# Patient Record
Sex: MALE | Race: WHITE | NOT HISPANIC OR LATINO | Employment: FULL TIME | URBAN - METROPOLITAN AREA
[De-identification: names, ages, dates, MRNs, and addresses within clinical notes are randomized per-mention and may not be internally consistent; named-entity substitution may affect disease eponyms.]

---

## 2018-11-13 ENCOUNTER — LAB VISIT (OUTPATIENT)
Dept: LAB | Facility: HOSPITAL | Age: 58
End: 2018-11-13
Attending: INTERNAL MEDICINE
Payer: COMMERCIAL

## 2018-11-13 ENCOUNTER — OFFICE VISIT (OUTPATIENT)
Dept: INTERNAL MEDICINE | Facility: CLINIC | Age: 58
End: 2018-11-13
Payer: COMMERCIAL

## 2018-11-13 VITALS
SYSTOLIC BLOOD PRESSURE: 160 MMHG | OXYGEN SATURATION: 95 % | WEIGHT: 206.13 LBS | BODY MASS INDEX: 32.35 KG/M2 | HEART RATE: 71 BPM | DIASTOLIC BLOOD PRESSURE: 106 MMHG | HEIGHT: 67 IN

## 2018-11-13 DIAGNOSIS — I10 UNCONTROLLED STAGE 2 HYPERTENSION: Primary | ICD-10-CM

## 2018-11-13 DIAGNOSIS — E66.9 OBESITY (BMI 30-39.9): ICD-10-CM

## 2018-11-13 DIAGNOSIS — Z23 NEED FOR INFLUENZA VACCINATION: ICD-10-CM

## 2018-11-13 DIAGNOSIS — Z11.59 NEED FOR HEPATITIS C SCREENING TEST: ICD-10-CM

## 2018-11-13 DIAGNOSIS — E78.5 HYPERLIPIDEMIA, UNSPECIFIED HYPERLIPIDEMIA TYPE: ICD-10-CM

## 2018-11-13 DIAGNOSIS — I10 UNCONTROLLED STAGE 2 HYPERTENSION: ICD-10-CM

## 2018-11-13 DIAGNOSIS — J01.41 ACUTE RECURRENT PANSINUSITIS: ICD-10-CM

## 2018-11-13 LAB
ALBUMIN SERPL BCP-MCNC: 3.7 G/DL
ALP SERPL-CCNC: 75 U/L
ALT SERPL W/O P-5'-P-CCNC: 28 U/L
ANION GAP SERPL CALC-SCNC: 12 MMOL/L
AST SERPL-CCNC: 25 U/L
BILIRUB SERPL-MCNC: 0.8 MG/DL
BUN SERPL-MCNC: 18 MG/DL
CALCIUM SERPL-MCNC: 9.4 MG/DL
CHLORIDE SERPL-SCNC: 101 MMOL/L
CHOLEST SERPL-MCNC: 238 MG/DL
CHOLEST/HDLC SERPL: 5.1 {RATIO}
CO2 SERPL-SCNC: 28 MMOL/L
CREAT SERPL-MCNC: 1.1 MG/DL
EST. GFR  (AFRICAN AMERICAN): >60 ML/MIN/1.73 M^2
EST. GFR  (NON AFRICAN AMERICAN): >60 ML/MIN/1.73 M^2
ESTIMATED AVG GLUCOSE: 111 MG/DL
GLUCOSE SERPL-MCNC: 113 MG/DL
HBA1C MFR BLD HPLC: 5.5 %
HDLC SERPL-MCNC: 47 MG/DL
HDLC SERPL: 19.7 %
LDLC SERPL CALC-MCNC: 165.6 MG/DL
NONHDLC SERPL-MCNC: 191 MG/DL
POTASSIUM SERPL-SCNC: 3.8 MMOL/L
PROT SERPL-MCNC: 7.2 G/DL
SODIUM SERPL-SCNC: 141 MMOL/L
TRIGL SERPL-MCNC: 127 MG/DL

## 2018-11-13 PROCEDURE — 36415 COLL VENOUS BLD VENIPUNCTURE: CPT | Mod: PO

## 2018-11-13 PROCEDURE — 83036 HEMOGLOBIN GLYCOSYLATED A1C: CPT

## 2018-11-13 PROCEDURE — 86803 HEPATITIS C AB TEST: CPT

## 2018-11-13 PROCEDURE — 80061 LIPID PANEL: CPT

## 2018-11-13 PROCEDURE — 99999 PR PBB SHADOW E&M-EST. PATIENT-LVL III: CPT | Mod: PBBFAC,,, | Performed by: INTERNAL MEDICINE

## 2018-11-13 PROCEDURE — 99203 OFFICE O/P NEW LOW 30 MIN: CPT | Mod: 25,S$GLB,, | Performed by: INTERNAL MEDICINE

## 2018-11-13 PROCEDURE — 3008F BODY MASS INDEX DOCD: CPT | Mod: CPTII,S$GLB,, | Performed by: INTERNAL MEDICINE

## 2018-11-13 PROCEDURE — 80053 COMPREHEN METABOLIC PANEL: CPT

## 2018-11-13 PROCEDURE — 90686 IIV4 VACC NO PRSV 0.5 ML IM: CPT | Mod: S$GLB,,, | Performed by: INTERNAL MEDICINE

## 2018-11-13 PROCEDURE — 90471 IMMUNIZATION ADMIN: CPT | Mod: S$GLB,,, | Performed by: INTERNAL MEDICINE

## 2018-11-13 RX ORDER — ASPIRIN 81 MG/1
81 TABLET ORAL DAILY
COMMUNITY
End: 2022-08-29

## 2018-11-13 RX ORDER — FLUTICASONE PROPIONATE 50 MCG
2 SPRAY, SUSPENSION (ML) NASAL DAILY
Qty: 16 G | Refills: 1 | Status: SHIPPED | OUTPATIENT
Start: 2018-11-13 | End: 2018-12-13

## 2018-11-13 RX ORDER — LOSARTAN POTASSIUM 50 MG/1
50 TABLET ORAL DAILY
Qty: 30 TABLET | Refills: 0 | Status: SHIPPED | OUTPATIENT
Start: 2018-11-13 | End: 2018-12-31

## 2018-11-13 RX ORDER — AMOXICILLIN AND CLAVULANATE POTASSIUM 875; 125 MG/1; MG/1
1 TABLET, FILM COATED ORAL EVERY 12 HOURS
Qty: 14 TABLET | Refills: 0 | Status: SHIPPED | OUTPATIENT
Start: 2018-11-13 | End: 2018-11-20

## 2018-11-13 NOTE — PROGRESS NOTES
Subjective:       Patient ID: Fan Chris is a 58 y.o. male.    Chief Complaint: Establish Care    HPI Mr. 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  Patient has previously been followed by cardiologist Dr. Mcdaniel.  Reports that his cardiac function improved significantly.  It improved to a normal level.  He was discharged from Cardiology.  (However per cardiology's notes he was to follow up with them 6 months after last appointment which would have been October 2017).  Reports he is unsure about medications because of potential side effects.  He does not want medications that could hurt his kidneys or his liver.  States he was on blood pressure medicines for minute.  However he did not like them.  When asked about specific side effects with prior medications such as losartan or carvedilol, patient reports that he did not have any.  States he has had a colonoscopy approximately 2 years ago and this was normal.  He retired from [employer] in May.  He has gained 20 lb since that time.  He also complains about current sinus issues.  He has nasal congestion, runny nose, postnasal drip.  He has been seen by an ENT in the past but he is currently not following with 1.  He recently went to an urgent care for these symptoms.  He was prescribed a Z-Josh and he was given a steroid shot.  Symptoms are persistent.    Review of Systems:  HEENT: +sinus pressure/pain, +PND, +rhinorrhea  Cardio: No chest pain, No leg swelling  Neuro: No headaches    Objective:      Physical Exam   Constitutional: He is oriented to person, place, and time. He appears well-developed and well-nourished. No distress.   HENT:   Head: Normocephalic and atraumatic.   Eyes: Conjunctivae and EOM are normal.   Cardiovascular: Normal rate, regular rhythm, normal heart sounds and intact distal pulses. Exam reveals no gallop and  no friction rub.   No murmur heard.  Pulmonary/Chest: Effort normal and breath sounds normal. No stridor. No respiratory distress. He has no wheezes. He has no rales.   Neurological: He is alert and oriented to person, place, and time.   Skin: Skin is warm and dry. He is not diaphoretic.   Psychiatric: He has a normal mood and affect. His behavior is normal. Judgment and thought content normal.   Vitals reviewed.      Assessment:       1. Uncontrolled stage 2 hypertension    2. Obesity (BMI 30-39.9)    3. Need for influenza vaccination    4. Need for hepatitis C screening test    5. Acute recurrent pansinusitis        Plan:     1.  Uncontrolled stage II hypertension  Starting losartan 50 mg p.o. daily  CMP, lipids, A1c pending  Return to clinic in 1 week for follow-up    2.  Obesity  The patient's BMI has been recorded in the chart. The patient has been provided educational materials regarding the benefits of attaining and maintaining a normal weight. We will continue to address and follow this issue during follow up visits.    3.  Influenza vaccine given    4.  Hepatitis-C antibody screening test ordered    5.  Acute recurrent pansinusitis  Augmentin 875/125 mg p.o. b.i.d. x7 days  Flonase, 2 sprays in each nostril daily    Attempting to obtain colonoscopy records    RTC one week, follow up BP

## 2018-11-13 NOTE — PATIENT INSTRUCTIONS
Low-Fat Diet    A low-fat diet will help you lose weight. It also can lower cholesterol and prevent symptoms of gallbladder disease. The average American diet contains up to 50% fat. This means that 50% of all calories come from fat (about 80 grams to 100 grams of fat per day). Choosing normal portions of foods from the list below can help lower your fat intake. Experts recommend that only 20% to 35% of your daily calories come from fat. The remaining 65% to 80% of calories will come from protein and carbohydrates. This is much healthier for you.  Breads  Ok: Whole-wheat or rye bread, alessandra or soda crackers, maty toast, plain rolls, bagels, English muffins  Avoid: Rolls and breads containing whole milk or egg; waffles, pancakes, biscuits, corn bread; cheese crackers, other flavored crackers, pastries, doughnuts  Cereals  Ok: Oatmeal, whole-wheat, bran, multigrain, rice  Avoid: Granola or other cereals that have oil, coconut, or more than 2 grams of fat per serving  Cheese and eggs  Ok: Cheeses labeled low-fat; 3 whole eggs per week; egg whites and egg substitutes as desired  Avoid: All other cheeses  Desserts  Ok: Gelatin, slushy, coni food cake, meringues, nonfat yogurt, and puddings or sherbet made with nonfat milk  Avoid: Any other store-bought desserts, or desserts that have fat, whole milk, cream, chocolate, and coconut  Drinks  Ok: Nonfat milk, coffee, tea, fizzy (carbonated) drinks  Avoid: Whole and reduced-fat milk, evaporated and condensed milk, hot chocolate mixes, milk shakes, malts, eggnog  Fats  Ok: You may have up to 3 teaspoons of fat daily. This can be butter, margarine, mayonnaise, or healthy oils (canola or olive)  Avoid: Cream, nondairy creams, cream cheese, gravies, and cream sauces  Fruits  Ok: All fruits made without fat  Avoid: Coconut, olives  Meats, poultry, fish  Ok: Limit meat to 6 ounces daily (broiled, roasted, baked, grilled, or boiled). Buy lean cuts, and trim off the fat. Try  beef, fish, lamb, pork, and canned fish packed in water; also chicken and turkey with the skin removed.  Avoid: Fried meats, fish, or poultry; fried eggs, and fish canned in oils; fatty meats such as avila, sausage, corned beef, hot dogs, and lunch meats; meats with gravies and sauces  Potatoes, beans, pasta  Ok: Dried beans, split peas, lentils, potatoes, rice, pasta made without added fat  Avoid: French fries, potato chips, potatoes prepared with butter, refried beans  Soups  Ok: Clear broth soups without fat and with allowed vegetables  Avoid: Cream-based soups  Vegetables  Ok: Fresh, frozen, canned or dried vegetables, all made without added fat  Avoid: Fried vegetables and those prepared with butter, cream, sauces  Miscellaneous  Ok: Salt, sugar, jelly, hard candy, marshmallows, honey, syrup, spices and herbs, mustard, ketchup, lemon, and vinegar. Try to limit sweets and added sugars.  Avoid: Chocolate, nuts, coconut, and cream candies; sunflower, sesame, and other seeds; fried foods; cream sauces and gravies; pizza  Date Last Reviewed: 8/1/2016 © 2000-2017 [employer]. [address] [place] [address]. All rights reserved. This information is not intended as a substitute for professional medical care. Always follow your healthcare professional's instructions.        Finding Your Ideal Weight  Most of the people in magazines and on TV are far slimmer than average, yet this is the ideal that many people aim for. Before you decide that you wont be happy until you get down to a certain number of pounds, consider:    · Your age. You probably wish you could get back to your college weight. But normal changes in metabolism and hormone levels that occur with aging can make this more difficult.  · Your gender. In general, men have more muscle and heavier bones than women, which means that healthy men usually weigh more than healthy women of the same height.  · Your current weight. If you are  very heavy, focus on losing a smaller amount (such as 10 percent of your body weight). Losing just 5 to 10 pounds can improve your health.  Your body fat percentage  A pound of muscle weighs the same as a pound of fat, but it takes up less space. Think of a trained athlete and a couch potato. Even though they may be the same height and weight, the athlete looks fitter, is healthier, and probably wears a smaller size of clothing. If you are muscular, a body fat test may be a more accurate measure of your ideal weight than the bathroom scale. Talk to your healthcare provider, who can help you set appropriate goals for yourself.  Body mass index (BMI)  Your body mass index is a number calculated from your weight and height. It is a fairly reliable indivactor of body fatness for most people. To calculate your BMI, see this BMI calculator from the CDC: http://www.cdc.gov/healthyweight/assessing/bmi/adult_bmi/english_bmi_calculator/bmi_calculator.html  Date Last Reviewed: 6/1/2015  © 8684-7019 [employer]. [address] [place] [address], [place] [address]. All rights reserved. This information is not intended as a substitute for professional medical care. Always follow your healthcare professional's instructions.

## 2018-11-14 LAB — HCV AB SERPL QL IA: NEGATIVE

## 2018-11-20 ENCOUNTER — OFFICE VISIT (OUTPATIENT)
Dept: INTERNAL MEDICINE | Facility: CLINIC | Age: 58
End: 2018-11-20
Payer: COMMERCIAL

## 2018-11-20 VITALS
HEART RATE: 62 BPM | HEIGHT: 67 IN | WEIGHT: 201.25 LBS | OXYGEN SATURATION: 97 % | DIASTOLIC BLOOD PRESSURE: 90 MMHG | BODY MASS INDEX: 31.59 KG/M2 | SYSTOLIC BLOOD PRESSURE: 120 MMHG

## 2018-11-20 DIAGNOSIS — E66.9 OBESITY (BMI 30-39.9): ICD-10-CM

## 2018-11-20 DIAGNOSIS — I10 ESSENTIAL HYPERTENSION: ICD-10-CM

## 2018-11-20 DIAGNOSIS — E78.5 HYPERLIPIDEMIA, UNSPECIFIED HYPERLIPIDEMIA TYPE: ICD-10-CM

## 2018-11-20 PROCEDURE — 3008F BODY MASS INDEX DOCD: CPT | Mod: CPTII,S$GLB,, | Performed by: INTERNAL MEDICINE

## 2018-11-20 PROCEDURE — 99999 PR PBB SHADOW E&M-EST. PATIENT-LVL III: CPT | Mod: PBBFAC,,, | Performed by: INTERNAL MEDICINE

## 2018-11-20 PROCEDURE — 99213 OFFICE O/P EST LOW 20 MIN: CPT | Mod: S$GLB,,, | Performed by: INTERNAL MEDICINE

## 2018-11-20 NOTE — PATIENT INSTRUCTIONS
Low-Fat Diet    A low-fat diet will help you lose weight. It also can lower cholesterol and prevent symptoms of gallbladder disease. The average American diet contains up to 50% fat. This means that 50% of all calories come from fat (about 80 grams to 100 grams of fat per day). Choosing normal portions of foods from the list below can help lower your fat intake. Experts recommend that only 20% to 35% of your daily calories come from fat. The remaining 65% to 80% of calories will come from protein and carbohydrates. This is much healthier for you.  Breads  Ok: Whole-wheat or rye bread, alessandra or soda crackers, maty toast, plain rolls, bagels, English muffins  Avoid: Rolls and breads containing whole milk or egg; waffles, pancakes, biscuits, corn bread; cheese crackers, other flavored crackers, pastries, doughnuts  Cereals  Ok: Oatmeal, whole-wheat, bran, multigrain, rice  Avoid: Granola or other cereals that have oil, coconut, or more than 2 grams of fat per serving  Cheese and eggs  Ok: Cheeses labeled low-fat; 3 whole eggs per week; egg whites and egg substitutes as desired  Avoid: All other cheeses  Desserts  Ok: Gelatin, slushy, coni food cake, meringues, nonfat yogurt, and puddings or sherbet made with nonfat milk  Avoid: Any other store-bought desserts, or desserts that have fat, whole milk, cream, chocolate, and coconut  Drinks  Ok: Nonfat milk, coffee, tea, fizzy (carbonated) drinks  Avoid: Whole and reduced-fat milk, evaporated and condensed milk, hot chocolate mixes, milk shakes, malts, eggnog  Fats  Ok: You may have up to 3 teaspoons of fat daily. This can be butter, margarine, mayonnaise, or healthy oils (canola or olive)  Avoid: Cream, nondairy creams, cream cheese, gravies, and cream sauces  Fruits  Ok: All fruits made without fat  Avoid: Coconut, olives  Meats, poultry, fish  Ok: Limit meat to 6 ounces daily (broiled, roasted, baked, grilled, or boiled). Buy lean cuts, and trim off the fat. Try  beef, fish, lamb, pork, and canned fish packed in water; also chicken and turkey with the skin removed.  Avoid: Fried meats, fish, or poultry; fried eggs, and fish canned in oils; fatty meats such as avila, sausage, corned beef, hot dogs, and lunch meats; meats with gravies and sauces  Potatoes, beans, pasta  Ok: Dried beans, split peas, lentils, potatoes, rice, pasta made without added fat  Avoid: French fries, potato chips, potatoes prepared with butter, refried beans  Soups  Ok: Clear broth soups without fat and with allowed vegetables  Avoid: Cream-based soups  Vegetables  Ok: Fresh, frozen, canned or dried vegetables, all made without added fat  Avoid: Fried vegetables and those prepared with butter, cream, sauces  Miscellaneous  Ok: Salt, sugar, jelly, hard candy, marshmallows, honey, syrup, spices and herbs, mustard, ketchup, lemon, and vinegar. Try to limit sweets and added sugars.  Avoid: Chocolate, nuts, coconut, and cream candies; sunflower, sesame, and other seeds; fried foods; cream sauces and gravies; pizza  Date Last Reviewed: 8/1/2016 © 2000-2017 [employer]. [address] [place] [address]. All rights reserved. This information is not intended as a substitute for professional medical care. Always follow your healthcare professional's instructions.        Finding Your Ideal Weight  Most of the people in magazines and on TV are far slimmer than average, yet this is the ideal that many people aim for. Before you decide that you wont be happy until you get down to a certain number of pounds, consider:    · Your age. You probably wish you could get back to your college weight. But normal changes in metabolism and hormone levels that occur with aging can make this more difficult.  · Your gender. In general, men have more muscle and heavier bones than women, which means that healthy men usually weigh more than healthy women of the same height.  · Your current weight. If you are  very heavy, focus on losing a smaller amount (such as 10 percent of your body weight). Losing just 5 to 10 pounds can improve your health.  Your body fat percentage  A pound of muscle weighs the same as a pound of fat, but it takes up less space. Think of a trained athlete and a couch potato. Even though they may be the same height and weight, the athlete looks fitter, is healthier, and probably wears a smaller size of clothing. If you are muscular, a body fat test may be a more accurate measure of your ideal weight than the bathroom scale. Talk to your healthcare provider, who can help you set appropriate goals for yourself.  Body mass index (BMI)  Your body mass index is a number calculated from your weight and height. It is a fairly reliable indivactor of body fatness for most people. To calculate your BMI, see this BMI calculator from the CDC: http://www.cdc.gov/healthyweight/assessing/bmi/adult_bmi/english_bmi_calculator/bmi_calculator.html  Date Last Reviewed: 6/1/2015  © 3179-3325 [employer]. [address] [place] [address], [place] [address]. All rights reserved. This information is not intended as a substitute for professional medical care. Always follow your healthcare professional's instructions.

## 2018-11-20 NOTE — PROGRESS NOTES
Subjective:       Patient ID: Fan Chris is a 58 y.o. male.    Chief Complaint: Follow-up (blood pressure)    HPI Mr. Chris is a 58-year-old male with hypertension who presents for follow-up of hypertension.  Patient started on losartan 50 mg p.o. daily last appointment.  Patient states that he is feeling well.  He was also given a course of Augmentin for treatment of sinusitis.  He is feeling 90% better.  He has no acute complaints or concerns today.    Review of Systems   All other systems reviewed and are negative.      Objective:      Physical Exam   Constitutional: He is oriented to person, place, and time. He appears well-developed and well-nourished. No distress.   HENT:   Head: Normocephalic and atraumatic.   Eyes: Conjunctivae and EOM are normal.   Cardiovascular: Normal rate, regular rhythm, normal heart sounds and intact distal pulses. Exam reveals no gallop and no friction rub.   No murmur heard.  Pulmonary/Chest: Effort normal and breath sounds normal. No stridor. No respiratory distress. He has no wheezes. He has no rales.   Neurological: He is alert and oriented to person, place, and time.   Skin: Skin is warm and dry. He is not diaphoretic.   Psychiatric: He has a normal mood and affect. His behavior is normal. Judgment and thought content normal.   Vitals reviewed.      Assessment:       1. Essential hypertension    2. Hyperlipidemia, unspecified hyperlipidemia type    3. Obesity (BMI 30-39.9)        Plan:     1.  Essential hypertension  Improved. But not at goal.  Recommend increasing losartan.  However patient declines at this time.  He prefers to work on diet and exercise and return for repeat blood pressure check in 3 months.  Continue losartan 50 mg p.o. daily    2.  Hyperlipidemia  Patient's 10 year risk of cardiovascular disease is 8.1%, with LDL greater than 160.  Recommend statin medication. However patient declines at this time.  He prefers to work on diet and exercise and return for  repeat lipid panel check in 3 months    3.  Obesity  The patient's BMI has been recorded in the chart. The patient has been provided educational materials regarding the benefits of attaining and maintaining a normal weight. We will continue to address and follow this issue during follow up visits.    Return to clinic in 3 months for follow-up of essential hypertension and hyperlipidemia.

## 2018-12-17 DIAGNOSIS — Z12.11 COLON CANCER SCREENING: ICD-10-CM

## 2018-12-31 ENCOUNTER — OFFICE VISIT (OUTPATIENT)
Dept: INTERNAL MEDICINE | Facility: CLINIC | Age: 58
End: 2018-12-31
Payer: COMMERCIAL

## 2018-12-31 VITALS
OXYGEN SATURATION: 96 % | DIASTOLIC BLOOD PRESSURE: 100 MMHG | HEART RATE: 84 BPM | SYSTOLIC BLOOD PRESSURE: 160 MMHG | BODY MASS INDEX: 32.8 KG/M2 | WEIGHT: 209 LBS | HEIGHT: 67 IN

## 2018-12-31 DIAGNOSIS — N50.89 TESTICULAR MASS: Primary | ICD-10-CM

## 2018-12-31 DIAGNOSIS — I10 UNCONTROLLED STAGE 2 HYPERTENSION: ICD-10-CM

## 2018-12-31 PROCEDURE — 99999 PR PBB SHADOW E&M-EST. PATIENT-LVL III: CPT | Mod: PBBFAC,,, | Performed by: INTERNAL MEDICINE

## 2018-12-31 PROCEDURE — 99214 OFFICE O/P EST MOD 30 MIN: CPT | Mod: S$GLB,,, | Performed by: INTERNAL MEDICINE

## 2018-12-31 PROCEDURE — 3008F BODY MASS INDEX DOCD: CPT | Mod: CPTII,S$GLB,, | Performed by: INTERNAL MEDICINE

## 2018-12-31 RX ORDER — LOSARTAN POTASSIUM 25 MG/1
25 TABLET ORAL DAILY
Qty: 30 TABLET | Refills: 0 | Status: SHIPPED | OUTPATIENT
Start: 2018-12-31 | End: 2019-01-28 | Stop reason: SDUPTHER

## 2018-12-31 NOTE — PROGRESS NOTES
Subjective:       Patient ID: Fan Chris is a 58 y.o. male.    Chief Complaint: Follow-up (growth r/l testicle)    HPI Mr. Chris is a 58-year-old male with hypertension who presents with a chief complaint of growth on the testicle.  The growth on the testicle is located on the right testicle.  The onset was Saturday.  It was 1st noticed on Saturday.  It is constant.  It has gotten smaller, been grown larger, and then gotten smaller again.  It is not associated with any pain, redness, penile discharge, or penile pain.  In regards to patient's blood pressure, patient states he is no longer taking blood pressure medication.  He reports that 50 mg of losartan was making his blood pressure too low.  At one point he was taking half a 10 mg tablet of something and his blood pressure was still low.    Review of Systems   Genitourinary: Positive for scrotal swelling. Negative for discharge, penile pain, penile swelling and testicular pain.       Objective:      Physical Exam   Constitutional: He is oriented to person, place, and time. He appears well-developed and well-nourished. No distress.   HENT:   Head: Normocephalic and atraumatic.   Eyes: Conjunctivae and EOM are normal.   Genitourinary:   Genitourinary Comments: Soft, mobile, ill-defined mass palpated on the posterior aspect of the right testicle.  No visible abnormalities of the penis or testicles noted.  No pain on palpation.  No lymphadenopathy in the inguinal region.  No lesions or ulcerations noted.   Musculoskeletal: He exhibits no edema or deformity.   Neurological: He is alert and oriented to person, place, and time.   Skin: Skin is warm and dry. He is not diaphoretic.   Psychiatric: He has a normal mood and affect. His behavior is normal. Judgment and thought content normal.   Vitals reviewed.      Assessment:       1. Testicular mass    2. Uncontrolled stage 2 hypertension        Plan:     1.  Testicular mass  Discussed with patient that this is likely  a benign hydrocele versus spermatocele.  Testicular ultrasound for reassurance    2.  Uncontrolled stage II hypertension  Given patient's reservations regarding blood pressure medication, will start him on losartan 25 mg tablet daily.  Patient may take half or a whole tablet daily.  He should return to clinic in 1 month for follow-up of hypertension.    RTC one month, f/u HTN

## 2019-01-03 ENCOUNTER — HOSPITAL ENCOUNTER (OUTPATIENT)
Dept: RADIOLOGY | Facility: HOSPITAL | Age: 59
Discharge: HOME | End: 2019-01-03
Attending: INTERNAL MEDICINE
Payer: COMMERCIAL

## 2019-01-03 DIAGNOSIS — N50.89 TESTICULAR MASS: ICD-10-CM

## 2019-01-03 PROCEDURE — 76870 US SCROTUM AND TESTICLES: ICD-10-PCS | Mod: 26,,, | Performed by: RADIOLOGY

## 2019-01-03 PROCEDURE — 76870 US EXAM SCROTUM: CPT | Mod: 26,,, | Performed by: RADIOLOGY

## 2019-01-03 PROCEDURE — 76870 US EXAM SCROTUM: CPT | Mod: TC

## 2019-01-04 ENCOUNTER — TELEPHONE (OUTPATIENT)
Dept: INTERNAL MEDICINE | Facility: CLINIC | Age: 59
End: 2019-01-04

## 2019-01-04 NOTE — TELEPHONE ENCOUNTER
Called patient, no answer, left VM to call office re: U/S results.      ----- Message from Opal Ortiz MD sent at 1/4/2019  8:44 AM CST -----  Please call the patient regarding his scrotal ultrasound results. Ultrasound is consistent with a benign mass called a hydrocele. No intervention is needed unless he is having pain/irritation or simply wishes to have it removed. If this is the case, I can refer him to urology for such removal. If not, no further investigation or treatment is needed. Thanks

## 2019-01-28 ENCOUNTER — OFFICE VISIT (OUTPATIENT)
Dept: INTERNAL MEDICINE | Facility: CLINIC | Age: 59
End: 2019-01-28
Payer: COMMERCIAL

## 2019-01-28 VITALS
HEART RATE: 82 BPM | SYSTOLIC BLOOD PRESSURE: 108 MMHG | WEIGHT: 213.19 LBS | OXYGEN SATURATION: 97 % | BODY MASS INDEX: 33.46 KG/M2 | TEMPERATURE: 98 F | DIASTOLIC BLOOD PRESSURE: 72 MMHG | HEIGHT: 67 IN

## 2019-01-28 DIAGNOSIS — E78.5 HYPERLIPIDEMIA, UNSPECIFIED HYPERLIPIDEMIA TYPE: ICD-10-CM

## 2019-01-28 DIAGNOSIS — I10 ESSENTIAL HYPERTENSION: ICD-10-CM

## 2019-01-28 DIAGNOSIS — J32.9 CHRONIC SINUSITIS, UNSPECIFIED LOCATION: ICD-10-CM

## 2019-01-28 DIAGNOSIS — Z12.5 PROSTATE CANCER SCREENING: ICD-10-CM

## 2019-01-28 PROCEDURE — 99999 PR PBB SHADOW E&M-EST. PATIENT-LVL III: CPT | Mod: PBBFAC,,, | Performed by: INTERNAL MEDICINE

## 2019-01-28 PROCEDURE — 3074F PR MOST RECENT SYSTOLIC BLOOD PRESSURE < 130 MM HG: ICD-10-PCS | Mod: CPTII,S$GLB,, | Performed by: INTERNAL MEDICINE

## 2019-01-28 PROCEDURE — 99999 PR PBB SHADOW E&M-EST. PATIENT-LVL III: ICD-10-PCS | Mod: PBBFAC,,, | Performed by: INTERNAL MEDICINE

## 2019-01-28 PROCEDURE — 3008F BODY MASS INDEX DOCD: CPT | Mod: CPTII,S$GLB,, | Performed by: INTERNAL MEDICINE

## 2019-01-28 PROCEDURE — 3074F SYST BP LT 130 MM HG: CPT | Mod: CPTII,S$GLB,, | Performed by: INTERNAL MEDICINE

## 2019-01-28 PROCEDURE — 3008F PR BODY MASS INDEX (BMI) DOCUMENTED: ICD-10-PCS | Mod: CPTII,S$GLB,, | Performed by: INTERNAL MEDICINE

## 2019-01-28 PROCEDURE — 3078F DIAST BP <80 MM HG: CPT | Mod: CPTII,S$GLB,, | Performed by: INTERNAL MEDICINE

## 2019-01-28 PROCEDURE — 99213 PR OFFICE/OUTPT VISIT, EST, LEVL III, 20-29 MIN: ICD-10-PCS | Mod: S$GLB,,, | Performed by: INTERNAL MEDICINE

## 2019-01-28 PROCEDURE — 99213 OFFICE O/P EST LOW 20 MIN: CPT | Mod: S$GLB,,, | Performed by: INTERNAL MEDICINE

## 2019-01-28 PROCEDURE — 3078F PR MOST RECENT DIASTOLIC BLOOD PRESSURE < 80 MM HG: ICD-10-PCS | Mod: CPTII,S$GLB,, | Performed by: INTERNAL MEDICINE

## 2019-01-28 RX ORDER — LOSARTAN POTASSIUM 25 MG/1
25 TABLET ORAL DAILY
Qty: 90 TABLET | Refills: 3 | Status: SHIPPED | OUTPATIENT
Start: 2019-01-28 | End: 2020-02-26

## 2019-01-28 RX ORDER — FLUTICASONE PROPIONATE 50 MCG
2 SPRAY, SUSPENSION (ML) NASAL DAILY
Qty: 16 G | Refills: 11 | Status: SHIPPED | OUTPATIENT
Start: 2019-01-28 | End: 2019-02-27

## 2019-01-28 NOTE — PROGRESS NOTES
Subjective:       Patient ID: Fan Chris is a 59 y.o. male.    Chief Complaint: Hypertension    HPI Mr Chris is a 59 year old male with hypertension, hyperlipidemia who presents for follow-up of hypertension.  At last appointment, patient was started on losartan 25 mg p.o. daily.  He has been compliant with this medication.  He denies any side effects or symptoms from the medication.  He denies any headache, dizziness, leg swelling. He is feeling well today and has no acute complaints.  He states that he is aware that his diet has not been ideal.  He has been eating things like carlee cake.  States that he knows what he needs to do in order to lose weight.  Reports having had colonoscopy around age 52 performed somewhere on [facility].  Reports that he had a normal colonoscopy and does not need repeat for 10 years.    Review of Systems   Cardiovascular: Negative for leg swelling.   Neurological: Negative for dizziness and headaches.       Objective:      Physical Exam   Constitutional: He is oriented to person, place, and time. He appears well-developed and well-nourished. No distress.   HENT:   Head: Normocephalic and atraumatic.   Eyes: Conjunctivae and EOM are normal.   Cardiovascular: Normal rate, regular rhythm, normal heart sounds and intact distal pulses. Exam reveals no gallop and no friction rub.   No murmur heard.  Pulmonary/Chest: Effort normal and breath sounds normal. No stridor. No respiratory distress. He has no wheezes. He has no rales.   Neurological: He is alert and oriented to person, place, and time.   Skin: Skin is warm and dry. He is not diaphoretic.   Psychiatric: He has a normal mood and affect. His behavior is normal. Judgment and thought content normal.   Vitals reviewed.      Assessment:       1. Essential hypertension    2. Hyperlipidemia, unspecified hyperlipidemia type    3. Chronic sinusitis, unspecified location    4. Prostate cancer screening        Plan:     1. Essential  hypertension  Stable, well controlled  Continue current medication    2.  Hyperlipidemia  Patient is aware that I recommend starting statin medication.  However patient does not want to start statin medication at this time.  Prefers to work on diet and exercise  Repeat lipid profile just prior to return visit  Patient aware of the importance of improving diet, exercise, and weight loss    3.  Chronic sinusitis    refill of Flonase given    4.  Prostate cancer screening  The risks versus benefits of prostate cancer screening were discussed with the patient.  Patient declines prostate cancer screening at this time    Return to clinic in March or April for follow-up of hyperlipidemia and weight.

## 2019-02-13 ENCOUNTER — TELEPHONE (OUTPATIENT)
Dept: INTERNAL MEDICINE | Facility: CLINIC | Age: 59
End: 2019-02-13

## 2019-02-13 NOTE — TELEPHONE ENCOUNTER
----- Message from Yeimi Li sent at 2/13/2019 10:47 AM CST -----  Contact: Self [phone]  Patient is requesting to be seen today by his PCP due to sinus issues. Please advice    Detail Level: Simple

## 2019-02-14 ENCOUNTER — OFFICE VISIT (OUTPATIENT)
Dept: INTERNAL MEDICINE | Facility: CLINIC | Age: 59
End: 2019-02-14
Payer: COMMERCIAL

## 2019-02-14 VITALS
HEART RATE: 79 BPM | TEMPERATURE: 98 F | DIASTOLIC BLOOD PRESSURE: 80 MMHG | WEIGHT: 213.88 LBS | HEIGHT: 67 IN | BODY MASS INDEX: 33.57 KG/M2 | OXYGEN SATURATION: 97 % | SYSTOLIC BLOOD PRESSURE: 130 MMHG

## 2019-02-14 DIAGNOSIS — B96.89 ACUTE BACTERIAL SINUSITIS: Primary | ICD-10-CM

## 2019-02-14 DIAGNOSIS — J01.90 ACUTE BACTERIAL SINUSITIS: Primary | ICD-10-CM

## 2019-02-14 PROCEDURE — 3075F PR MOST RECENT SYSTOLIC BLOOD PRESS GE 130-139MM HG: ICD-10-PCS | Mod: CPTII,S$GLB,, | Performed by: INTERNAL MEDICINE

## 2019-02-14 PROCEDURE — 99999 PR PBB SHADOW E&M-EST. PATIENT-LVL III: CPT | Mod: PBBFAC,,, | Performed by: INTERNAL MEDICINE

## 2019-02-14 PROCEDURE — 3079F PR MOST RECENT DIASTOLIC BLOOD PRESSURE 80-89 MM HG: ICD-10-PCS | Mod: CPTII,S$GLB,, | Performed by: INTERNAL MEDICINE

## 2019-02-14 PROCEDURE — 3008F PR BODY MASS INDEX (BMI) DOCUMENTED: ICD-10-PCS | Mod: CPTII,S$GLB,, | Performed by: INTERNAL MEDICINE

## 2019-02-14 PROCEDURE — 3008F BODY MASS INDEX DOCD: CPT | Mod: CPTII,S$GLB,, | Performed by: INTERNAL MEDICINE

## 2019-02-14 PROCEDURE — 99999 PR PBB SHADOW E&M-EST. PATIENT-LVL III: ICD-10-PCS | Mod: PBBFAC,,, | Performed by: INTERNAL MEDICINE

## 2019-02-14 PROCEDURE — 3079F DIAST BP 80-89 MM HG: CPT | Mod: CPTII,S$GLB,, | Performed by: INTERNAL MEDICINE

## 2019-02-14 PROCEDURE — 99214 OFFICE O/P EST MOD 30 MIN: CPT | Mod: S$GLB,,, | Performed by: INTERNAL MEDICINE

## 2019-02-14 PROCEDURE — 99214 PR OFFICE/OUTPT VISIT, EST, LEVL IV, 30-39 MIN: ICD-10-PCS | Mod: S$GLB,,, | Performed by: INTERNAL MEDICINE

## 2019-02-14 PROCEDURE — 3075F SYST BP GE 130 - 139MM HG: CPT | Mod: CPTII,S$GLB,, | Performed by: INTERNAL MEDICINE

## 2019-02-14 RX ORDER — OXYMETAZOLINE HCL 0.05 %
2 SPRAY, NON-AEROSOL (ML) NASAL 2 TIMES DAILY
Qty: 15 ML | Refills: 0 | COMMUNITY
Start: 2019-02-14 | End: 2019-02-17

## 2019-02-14 RX ORDER — AMOXICILLIN AND CLAVULANATE POTASSIUM 875; 125 MG/1; MG/1
1 TABLET, FILM COATED ORAL EVERY 12 HOURS
Qty: 14 TABLET | Refills: 0 | Status: SHIPPED | OUTPATIENT
Start: 2019-02-14 | End: 2019-02-21

## 2019-02-14 NOTE — PROGRESS NOTES
Subjective:       Patient ID: Fan Chris is a 59 y.o. male.    Chief Complaint: Sinus Problem    HPI Mr. Chris is a 59-year-old male who presents with a chief complaint of sinus problems.  Onset of symptoms was on Friday or Saturday.  He feels a little bit of sinus pressure.  It is associated with postnasal drip and runny nose.  It is not associated with any fever, ear pain, or sore throat.  He tried taking Emergen-C over-the-counter, and he reports that it seemed to help a little bit.  His symptoms are not necessarily better or worse since a started.  They are different.  Initially he had a runny nose and now it is clogged.  He continues to use Flonase once daily in Zyrtec once daily as well.  Symptoms are constant.    Review of Systems   Constitutional: Negative for fever.   HENT: Positive for congestion, postnasal drip, rhinorrhea, sinus pressure and sinus pain.        Objective:      Physical Exam   Constitutional: He is oriented to person, place, and time. He appears well-developed and well-nourished. No distress.   HENT:   Head: Normocephalic and atraumatic.   Right Ear: External ear normal.   Left Ear: External ear normal.   Nose: Nose normal.   Mouth/Throat: Posterior oropharyngeal erythema present. No oropharyngeal exudate or posterior oropharyngeal edema.   Eyes: Conjunctivae and EOM are normal.   Neck: Neck supple.   Cardiovascular: Normal rate, regular rhythm, normal heart sounds and intact distal pulses. Exam reveals no gallop and no friction rub.   No murmur heard.  Pulmonary/Chest: Effort normal and breath sounds normal. No stridor. No respiratory distress. He has no wheezes. He has no rales.   Lymphadenopathy:     He has no cervical adenopathy.   Neurological: He is alert and oriented to person, place, and time.   Skin: Skin is warm and dry. He is not diaphoretic.   Psychiatric: He has a normal mood and affect. His behavior is normal. Judgment and thought content normal.   Vitals reviewed.       Assessment:       1. Acute bacterial sinusitis        Plan:     1. Acute bacterial sinusitis  Augmentin 875/125 mg p.o. b.i.d. x7 days  Can use Flonase twice daily while having symptoms  Use Afrin, twice daily for 3 days.  Patient instructed not to use longer than 3 days due to potential for rebound congestion  Continue daily Zyrtec    RTC PRN

## 2019-04-15 ENCOUNTER — PATIENT OUTREACH (OUTPATIENT)
Dept: ADMINISTRATIVE | Facility: HOSPITAL | Age: 59
End: 2019-04-15

## 2020-01-08 ENCOUNTER — PATIENT OUTREACH (OUTPATIENT)
Dept: ADMINISTRATIVE | Facility: HOSPITAL | Age: 60
End: 2020-01-08

## 2020-01-08 NOTE — PROGRESS NOTES
Outgoing call to [facility] For copy of Colonoscopy report Fax # given

## 2020-01-09 ENCOUNTER — PATIENT MESSAGE (OUTPATIENT)
Dept: INTERNAL MEDICINE | Facility: CLINIC | Age: 60
End: 2020-01-09

## 2020-01-21 ENCOUNTER — LAB VISIT (OUTPATIENT)
Dept: LAB | Facility: HOSPITAL | Age: 60
End: 2020-01-21
Attending: INTERNAL MEDICINE
Payer: COMMERCIAL

## 2020-01-21 ENCOUNTER — OFFICE VISIT (OUTPATIENT)
Dept: INTERNAL MEDICINE | Facility: CLINIC | Age: 60
End: 2020-01-21
Payer: COMMERCIAL

## 2020-01-21 VITALS
HEIGHT: 67 IN | HEART RATE: 80 BPM | WEIGHT: 211.19 LBS | OXYGEN SATURATION: 98 % | SYSTOLIC BLOOD PRESSURE: 152 MMHG | DIASTOLIC BLOOD PRESSURE: 92 MMHG | BODY MASS INDEX: 33.15 KG/M2

## 2020-01-21 DIAGNOSIS — Z00.00 ANNUAL PHYSICAL EXAM: ICD-10-CM

## 2020-01-21 DIAGNOSIS — G47.33 OSA (OBSTRUCTIVE SLEEP APNEA): ICD-10-CM

## 2020-01-21 DIAGNOSIS — Z12.5 PROSTATE CANCER SCREENING: ICD-10-CM

## 2020-01-21 DIAGNOSIS — E66.9 OBESITY (BMI 30-39.9): ICD-10-CM

## 2020-01-21 DIAGNOSIS — I10 ESSENTIAL HYPERTENSION: ICD-10-CM

## 2020-01-21 DIAGNOSIS — K64.8 INTERNAL HEMORRHOIDS: ICD-10-CM

## 2020-01-21 LAB
ALBUMIN SERPL BCP-MCNC: 4.5 G/DL (ref 3.5–5.2)
ALP SERPL-CCNC: 79 U/L (ref 55–135)
ALT SERPL W/O P-5'-P-CCNC: 30 U/L (ref 10–44)
ANION GAP SERPL CALC-SCNC: 16 MMOL/L (ref 8–16)
AST SERPL-CCNC: 24 U/L (ref 10–40)
BASOPHILS # BLD AUTO: 0.05 K/UL (ref 0–0.2)
BASOPHILS NFR BLD: 0.8 % (ref 0–1.9)
BILIRUB SERPL-MCNC: 0.9 MG/DL (ref 0.1–1)
BUN SERPL-MCNC: 19 MG/DL (ref 6–20)
CALCIUM SERPL-MCNC: 10 MG/DL (ref 8.7–10.5)
CHLORIDE SERPL-SCNC: 98 MMOL/L (ref 95–110)
CHOLEST SERPL-MCNC: 314 MG/DL (ref 120–199)
CHOLEST/HDLC SERPL: 8.3 {RATIO} (ref 2–5)
CO2 SERPL-SCNC: 25 MMOL/L (ref 23–29)
CREAT SERPL-MCNC: 1.4 MG/DL (ref 0.5–1.4)
DIFFERENTIAL METHOD: ABNORMAL
EOSINOPHIL # BLD AUTO: 0.2 K/UL (ref 0–0.5)
EOSINOPHIL NFR BLD: 2.5 % (ref 0–8)
ERYTHROCYTE [DISTWIDTH] IN BLOOD BY AUTOMATED COUNT: 13.5 % (ref 11.5–14.5)
EST. GFR  (AFRICAN AMERICAN): >60 ML/MIN/1.73 M^2
EST. GFR  (NON AFRICAN AMERICAN): 55 ML/MIN/1.73 M^2
ESTIMATED AVG GLUCOSE: 114 MG/DL (ref 68–131)
GLUCOSE SERPL-MCNC: 100 MG/DL (ref 70–110)
HBA1C MFR BLD HPLC: 5.6 % (ref 4–5.6)
HCT VFR BLD AUTO: 52.5 % (ref 40–54)
HDLC SERPL-MCNC: 38 MG/DL (ref 40–75)
HDLC SERPL: 12.1 % (ref 20–50)
HGB BLD-MCNC: 17 G/DL (ref 14–18)
IMM GRANULOCYTES # BLD AUTO: 0.03 K/UL (ref 0–0.04)
IMM GRANULOCYTES NFR BLD AUTO: 0.5 % (ref 0–0.5)
IRON SERPL-MCNC: 125 UG/DL (ref 45–160)
LDLC SERPL CALC-MCNC: 224.6 MG/DL (ref 63–159)
LYMPHOCYTES # BLD AUTO: 1.6 K/UL (ref 1–4.8)
LYMPHOCYTES NFR BLD: 26.2 % (ref 18–48)
MCH RBC QN AUTO: 29.1 PG (ref 27–31)
MCHC RBC AUTO-ENTMCNC: 32.4 G/DL (ref 32–36)
MCV RBC AUTO: 90 FL (ref 82–98)
MONOCYTES # BLD AUTO: 0.5 K/UL (ref 0.3–1)
MONOCYTES NFR BLD: 8.2 % (ref 4–15)
NEUTROPHILS # BLD AUTO: 3.8 K/UL (ref 1.8–7.7)
NEUTROPHILS NFR BLD: 61.8 % (ref 38–73)
NONHDLC SERPL-MCNC: 276 MG/DL
NRBC BLD-RTO: 0 /100 WBC
PLATELET # BLD AUTO: 447 K/UL (ref 150–350)
PMV BLD AUTO: 8.8 FL (ref 9.2–12.9)
POTASSIUM SERPL-SCNC: 4.3 MMOL/L (ref 3.5–5.1)
PROT SERPL-MCNC: 7.9 G/DL (ref 6–8.4)
RBC # BLD AUTO: 5.85 M/UL (ref 4.6–6.2)
SATURATED IRON: 28 % (ref 20–50)
SODIUM SERPL-SCNC: 139 MMOL/L (ref 136–145)
TOTAL IRON BINDING CAPACITY: 441 UG/DL (ref 250–450)
TRANSFERRIN SERPL-MCNC: 298 MG/DL (ref 200–375)
TRIGL SERPL-MCNC: 257 MG/DL (ref 30–150)
TSH SERPL DL<=0.005 MIU/L-ACNC: 1.97 UIU/ML (ref 0.4–4)
WBC # BLD AUTO: 6.08 K/UL (ref 3.9–12.7)

## 2020-01-21 PROCEDURE — 83036 HEMOGLOBIN GLYCOSYLATED A1C: CPT

## 2020-01-21 PROCEDURE — 84443 ASSAY THYROID STIM HORMONE: CPT

## 2020-01-21 PROCEDURE — 99999 PR PBB SHADOW E&M-EST. PATIENT-LVL III: ICD-10-PCS | Mod: PBBFAC,,, | Performed by: INTERNAL MEDICINE

## 2020-01-21 PROCEDURE — 85060 PATHOLOGIST REVIEW: ICD-10-PCS | Mod: ,,, | Performed by: PATHOLOGY

## 2020-01-21 PROCEDURE — 99396 PR PREVENTIVE VISIT,EST,40-64: ICD-10-PCS | Mod: S$GLB,,, | Performed by: INTERNAL MEDICINE

## 2020-01-21 PROCEDURE — 83540 ASSAY OF IRON: CPT

## 2020-01-21 PROCEDURE — 3077F SYST BP >= 140 MM HG: CPT | Mod: CPTII,S$GLB,, | Performed by: INTERNAL MEDICINE

## 2020-01-21 PROCEDURE — 84153 ASSAY OF PSA TOTAL: CPT

## 2020-01-21 PROCEDURE — 3080F PR MOST RECENT DIASTOLIC BLOOD PRESSURE >= 90 MM HG: ICD-10-PCS | Mod: CPTII,S$GLB,, | Performed by: INTERNAL MEDICINE

## 2020-01-21 PROCEDURE — 85025 COMPLETE CBC W/AUTO DIFF WBC: CPT

## 2020-01-21 PROCEDURE — 99396 PREV VISIT EST AGE 40-64: CPT | Mod: S$GLB,,, | Performed by: INTERNAL MEDICINE

## 2020-01-21 PROCEDURE — 3077F PR MOST RECENT SYSTOLIC BLOOD PRESSURE >= 140 MM HG: ICD-10-PCS | Mod: CPTII,S$GLB,, | Performed by: INTERNAL MEDICINE

## 2020-01-21 PROCEDURE — 3080F DIAST BP >= 90 MM HG: CPT | Mod: CPTII,S$GLB,, | Performed by: INTERNAL MEDICINE

## 2020-01-21 PROCEDURE — 85060 BLOOD SMEAR INTERPRETATION: CPT | Mod: ,,, | Performed by: PATHOLOGY

## 2020-01-21 PROCEDURE — 36415 COLL VENOUS BLD VENIPUNCTURE: CPT | Mod: PO

## 2020-01-21 PROCEDURE — 86703 HIV-1/HIV-2 1 RESULT ANTBDY: CPT

## 2020-01-21 PROCEDURE — 80053 COMPREHEN METABOLIC PANEL: CPT

## 2020-01-21 PROCEDURE — 80061 LIPID PANEL: CPT

## 2020-01-21 PROCEDURE — 99999 PR PBB SHADOW E&M-EST. PATIENT-LVL III: CPT | Mod: PBBFAC,,, | Performed by: INTERNAL MEDICINE

## 2020-01-21 RX ORDER — HYDROCHLOROTHIAZIDE 12.5 MG/1
12.5 TABLET ORAL DAILY
Qty: 30 TABLET | Refills: 2 | Status: SHIPPED | OUTPATIENT
Start: 2020-01-21 | End: 2020-02-26 | Stop reason: SDUPTHER

## 2020-01-21 NOTE — PROGRESS NOTES
Patient ID: Fan Chris is a 60 y.o. male.    Chief Complaint: Annual Exam    HPI Fan is a 60 y.o. male with HTN and HLD who presents for annual exam. He has no acute complaints or concerns today.  He was previously prescribed losartan by me for treatment of hypertension.  He started on 25 mg daily and this was increased to 50 mg.  But he did not feel well at 50 mg and he decreased to 25 mg.  Then reports he did not feel well on 25 mg either.  He is not taking any antihypertensive medications currently.  In the past, it appears he has been treated with carvedilol and possibly amlodipine as well with adverse side effects associated with all.  Patient reports that he exercises 3 times per week.  He does however have difficulty with portion control and he eats out at restaurants quite a bit.  Reports that he has occasional rectal bleeding which is painless.  We did review recent colonoscopy from 2016 together in clinic today which showed internal hemorrhoids and otherwise was normal; with repeat to be done in 10 years.  He denies any associated constipation or straining with stools.    Review of Systems   Gastrointestinal:        Rectal bleeding (see HPI)   All other systems reviewed and are negative.        Objective:     Vitals:    01/21/20 1307   BP: (!) 152/92   Pulse: 80        Physical Exam   Constitutional: He is oriented to person, place, and time. He appears well-developed and well-nourished. No distress.   HENT:   Head: Normocephalic and atraumatic.   Right Ear: External ear normal.   Left Ear: External ear normal.   Nose: Nose normal.   Mouth/Throat: Oropharynx is clear and moist. No oropharyngeal exudate.   Eyes: Conjunctivae and EOM are normal. Right eye exhibits no discharge. Left eye exhibits no discharge.   Neck: Neck supple. No tracheal deviation present. No thyromegaly present.   Cardiovascular: Normal rate, regular rhythm, normal heart sounds and intact distal pulses. Exam reveals no gallop and  no friction rub.   No murmur heard.  Pulmonary/Chest: Effort normal and breath sounds normal. No stridor. No respiratory distress. He has no wheezes. He has no rales. He exhibits no tenderness.   Abdominal: Soft. Bowel sounds are normal. He exhibits no distension and no mass. There is no tenderness. There is no rebound and no guarding. No hernia.   Musculoskeletal: He exhibits no edema, tenderness or deformity.   Lymphadenopathy:     He has no cervical adenopathy.   Neurological: He is alert and oriented to person, place, and time. No cranial nerve deficit.   Skin: Skin is warm and dry. No rash noted. He is not diaphoretic. No erythema.   Psychiatric: He has a normal mood and affect. His behavior is normal. Judgment and thought content normal.   Vitals reviewed.      Assessment:       1. Annual physical exam    2. JACLYN (obstructive sleep apnea) Active   3. Prostate cancer screening    4. Essential hypertension Active   5. Obesity (BMI 30-39.9) Active   6. Internal hemorrhoids Active       Plan:         Annual physical exam  -     CBC auto differential; Future; Expected date: 01/21/2020  -     Comprehensive metabolic panel; Future; Expected date: 01/21/2020  -     Hemoglobin A1c; Future; Expected date: 01/21/2020  -     Lipid panel; Future; Expected date: 01/21/2020  -     TSH; Future; Expected date: 01/21/2020  -     HIV 1/2 Ag/Ab (4th Gen); Future; Expected date: 01/21/2020  -     Iron and TIBC; Future; Expected date: 01/21/2020    JACLYN (obstructive sleep apnea)  Comments:  STOP BANG score 4 points, high risk. Referral placed to sleep medicine  Orders:  -     Ambulatory referral to Sleep Disorders    Prostate cancer screening  -     PSA, Screening; Future; Expected date: 01/21/2020    Essential hypertension  Comments:  Start HCTZ 12.5 mg today. RTC in one month with BMP just prior to return. Low salt diet  Orders:  -     Basic metabolic panel; Future; Expected date: 02/21/2020  -     hydroCHLOROthiazide (HYDRODIURIL)  12.5 MG Tab; Take 1 tablet (12.5 mg total) by mouth once daily.  Dispense: 30 tablet; Refill: 2    Obesity (BMI 30-39.9)  Comments:  Encouraged continued exercise and less restaurant foods. Low salt diet    Internal hemorrhoids  Comments:  Likely cause for occasional bleeding as no other abnormality seen on colonoscopy.  Will check iron levels today.  If iron deficiency present, will refer to GI    Other orders  -     Cancel: Ear Cerumen Removal      RTC 1 month, f/u HTN      Warning signs discussed, patient to call with any further issues or worsening of symptoms.       Parts of the above note were dictated using a voice dictation software. Please excuse any grammatical or typographical errors.

## 2020-01-22 ENCOUNTER — PATIENT MESSAGE (OUTPATIENT)
Dept: INTERNAL MEDICINE | Facility: CLINIC | Age: 60
End: 2020-01-22

## 2020-01-22 LAB
COMPLEXED PSA SERPL-MCNC: 0.98 NG/ML (ref 0–4)
HIV 1+2 AB+HIV1 P24 AG SERPL QL IA: NEGATIVE

## 2020-01-23 LAB — PATH REV BLD -IMP: NORMAL

## 2020-01-23 NOTE — TELEPHONE ENCOUNTER
"Spoke with Dg at [facility] lab [id number] to have a "pathology interpretation" on pt's lab on CBC.  "

## 2020-01-23 NOTE — TELEPHONE ENCOUNTER
"----- Message from Opal Ortiz MD sent at 1/22/2020  2:33 PM CST -----  Please see if patient;s blood sample is still in the lab. If it is, I want to add on an order called "pathologist interpretation". Thanks    "

## 2020-01-24 LAB — PATH REV BLD -IMP: NORMAL

## 2020-02-10 DIAGNOSIS — D75.839 THROMBOCYTOSIS: Primary | ICD-10-CM

## 2020-02-11 ENCOUNTER — TELEPHONE (OUTPATIENT)
Dept: ADMINISTRATIVE | Facility: OTHER | Age: 60
End: 2020-02-11

## 2020-02-18 ENCOUNTER — LAB VISIT (OUTPATIENT)
Dept: LAB | Facility: HOSPITAL | Age: 60
End: 2020-02-18
Attending: INTERNAL MEDICINE
Payer: COMMERCIAL

## 2020-02-18 DIAGNOSIS — I10 ESSENTIAL HYPERTENSION: ICD-10-CM

## 2020-02-18 LAB
ANION GAP SERPL CALC-SCNC: 10 MMOL/L (ref 8–16)
BUN SERPL-MCNC: 16 MG/DL (ref 6–20)
CALCIUM SERPL-MCNC: 9.6 MG/DL (ref 8.7–10.5)
CHLORIDE SERPL-SCNC: 103 MMOL/L (ref 95–110)
CO2 SERPL-SCNC: 28 MMOL/L (ref 23–29)
CREAT SERPL-MCNC: 1.2 MG/DL (ref 0.5–1.4)
EST. GFR  (AFRICAN AMERICAN): >60 ML/MIN/1.73 M^2
EST. GFR  (NON AFRICAN AMERICAN): >60 ML/MIN/1.73 M^2
GLUCOSE SERPL-MCNC: 98 MG/DL (ref 70–110)
POTASSIUM SERPL-SCNC: 3.7 MMOL/L (ref 3.5–5.1)
SODIUM SERPL-SCNC: 141 MMOL/L (ref 136–145)

## 2020-02-18 PROCEDURE — 36415 COLL VENOUS BLD VENIPUNCTURE: CPT | Mod: PO

## 2020-02-18 PROCEDURE — 80048 BASIC METABOLIC PNL TOTAL CA: CPT

## 2020-02-26 ENCOUNTER — OFFICE VISIT (OUTPATIENT)
Dept: INTERNAL MEDICINE | Facility: CLINIC | Age: 60
End: 2020-02-26
Payer: COMMERCIAL

## 2020-02-26 DIAGNOSIS — D75.839 THROMBOCYTOSIS: ICD-10-CM

## 2020-02-26 DIAGNOSIS — E78.5 HYPERLIPIDEMIA, UNSPECIFIED HYPERLIPIDEMIA TYPE: Primary | ICD-10-CM

## 2020-02-26 DIAGNOSIS — I10 ESSENTIAL HYPERTENSION: ICD-10-CM

## 2020-02-26 PROCEDURE — 99213 OFFICE O/P EST LOW 20 MIN: CPT | Mod: S$GLB,,, | Performed by: INTERNAL MEDICINE

## 2020-02-26 PROCEDURE — 3079F DIAST BP 80-89 MM HG: CPT | Mod: CPTII,S$GLB,, | Performed by: INTERNAL MEDICINE

## 2020-02-26 PROCEDURE — 99999 PR PBB SHADOW E&M-EST. PATIENT-LVL IV: ICD-10-PCS | Mod: PBBFAC,,, | Performed by: INTERNAL MEDICINE

## 2020-02-26 PROCEDURE — 3079F PR MOST RECENT DIASTOLIC BLOOD PRESSURE 80-89 MM HG: ICD-10-PCS | Mod: CPTII,S$GLB,, | Performed by: INTERNAL MEDICINE

## 2020-02-26 PROCEDURE — 99213 PR OFFICE/OUTPT VISIT, EST, LEVL III, 20-29 MIN: ICD-10-PCS | Mod: S$GLB,,, | Performed by: INTERNAL MEDICINE

## 2020-02-26 PROCEDURE — 3008F PR BODY MASS INDEX (BMI) DOCUMENTED: ICD-10-PCS | Mod: CPTII,S$GLB,, | Performed by: INTERNAL MEDICINE

## 2020-02-26 PROCEDURE — 3074F PR MOST RECENT SYSTOLIC BLOOD PRESSURE < 130 MM HG: ICD-10-PCS | Mod: CPTII,S$GLB,, | Performed by: INTERNAL MEDICINE

## 2020-02-26 PROCEDURE — 3074F SYST BP LT 130 MM HG: CPT | Mod: CPTII,S$GLB,, | Performed by: INTERNAL MEDICINE

## 2020-02-26 PROCEDURE — 99999 PR PBB SHADOW E&M-EST. PATIENT-LVL IV: CPT | Mod: PBBFAC,,, | Performed by: INTERNAL MEDICINE

## 2020-02-26 PROCEDURE — 3008F BODY MASS INDEX DOCD: CPT | Mod: CPTII,S$GLB,, | Performed by: INTERNAL MEDICINE

## 2020-02-26 RX ORDER — PRAVASTATIN SODIUM 10 MG/1
10 TABLET ORAL DAILY
Qty: 90 TABLET | Refills: 3 | Status: SHIPPED | OUTPATIENT
Start: 2020-02-26 | End: 2020-08-26

## 2020-02-26 RX ORDER — HYDROCHLOROTHIAZIDE 12.5 MG/1
12.5 TABLET ORAL DAILY
Qty: 90 TABLET | Refills: 3 | Status: SHIPPED | OUTPATIENT
Start: 2020-02-26 | End: 2020-08-04 | Stop reason: SDUPTHER

## 2020-02-26 NOTE — PROGRESS NOTES
PATIENT: Fan Chris  MRN: [id number]  DATE: 2/26/2020    Diagnosis:   1. Thrombocytosis    2. Advance care planning      Chief Complaint: thrombocytosis    Subjective:    History of Present Illness: Mr. Chris is a 60 y.o. male who presents for evaluation and management of thrombocytosis. He is referred by Dr. Ortiz.    - labs dating back to 2009 reveal a mild thrombocytosis.  - he denies a history of splenectomy, thrombosis, stroke, easy bleeding/bruising. He denies shortness of breath, chest pain, nausea, vomiting, diarrhea, constipation.    Past medical, surgical, family, and social histories have been reviewed and updated below.    Past Medical History:   Past Medical History:   Diagnosis Date    Asthma     Bronchitis     Hyperlipidemia 10/14/2016    Hypertension     Sinusitis        Past Surgical History:   Past Surgical History:   Procedure Laterality Date    arm fracture      EYE SURGERY      TONSILLECTOMY         Family History:   Family History   Problem Relation Age of Onset    Lung cancer Mother     Cancer Mother 61        lung    Hypertension Father     Hyperlipidemia Father        Social History:  reports that he quit smoking about 35 years ago. His smoking use included cigarettes. He has a 8.00 pack-year smoking history. He has never used smokeless tobacco. He reports that he drinks about 1.0 standard drinks of alcohol per week. He reports that he does not use drugs.    Allergies:  Review of patient's allergies indicates:  No Known Allergies    Medications:  Current Outpatient Medications   Medication Sig Dispense Refill    aspirin (ECOTRIN) 81 MG EC tablet Take 81 mg by mouth once daily.      BEE POLLEN ORAL Take by mouth.      cetirizine (ZYRTEC) 10 MG tablet Take 10 mg by mouth once daily.      hydroCHLOROthiazide (HYDRODIURIL) 12.5 MG Tab Take 1 tablet (12.5 mg total) by mouth once daily. 90 tablet 3    pravastatin (PRAVACHOL) 10 MG tablet Take 1 tablet (10 mg total) by mouth once  daily. 90 tablet 3    zinc gluconate 50 mg tablet Take 50 mg by mouth once daily.       No current facility-administered medications for this visit.        Review of Systems   Constitutional: Negative for fatigue and unexpected weight change.   HENT: Negative for sore throat.    Eyes: Negative for visual disturbance.   Respiratory: Negative for cough and shortness of breath.    Cardiovascular: Negative for chest pain.   Gastrointestinal: Negative for abdominal pain.   Genitourinary: Negative for dysuria.   Musculoskeletal: Negative for back pain.   Skin: Negative for rash.   Neurological: Negative for headaches.   Hematological: Negative for adenopathy. Does not bruise/bleed easily.   Psychiatric/Behavioral: The patient is not nervous/anxious.        ECOG Performance Status:   ECOG SCORE 0       Objective:      Vitals:   Vitals:    02/27/20 1315   BP: (!) 166/102   Pulse: 90   Resp: 18   Temp: 98.8 °F (37.1 °C)   TempSrc: Oral   SpO2: 99%   Weight: 99.3 kg (218 lb 14.7 oz)     BMI: Body mass index is 34.29 kg/m².    Physical Exam   Constitutional: He is oriented to person, place, and time. He appears well-developed and well-nourished.   HENT:   Head: Normocephalic and atraumatic.   Eyes: Pupils are equal, round, and reactive to light. EOM are normal.   Neck: Normal range of motion. Neck supple.   Cardiovascular: Normal rate and regular rhythm.   Pulmonary/Chest: Effort normal and breath sounds normal.   Abdominal: Soft. Bowel sounds are normal.   Musculoskeletal: Normal range of motion. He exhibits no edema.   Neurological: He is alert and oriented to person, place, and time.   Skin: Skin is warm and dry.   Psychiatric: He has a normal mood and affect. His behavior is normal. Judgment and thought content normal.   Nursing note and vitals reviewed.      Laboratory Data:  Labs have been reviewed.    Lab Results   Component Value Date    WBC 6.08 01/21/2020    HGB 17.0 01/21/2020    HCT 52.5 01/21/2020    MCV 90  01/21/2020     (H) 01/21/2020               Imaging:    Assessment:       1. Thrombocytosis    2. Advance care planning         Plan:     1. Thrombocytosis  - I have reviewed his labs/chart.  - labs dating back to 2009 reveal a mild thrombocytosis.  - I suspect he has a secondary/reactive thrombocytosis (his total iron binding capacity in January 2020 was borderline-elevated, suggestive of iron deficiency), but I will perform a full workup.  - check the following: CBC, ESR, c-reactive protein, ferritin, BCR/ABL, JAK2 with reflex testing.  - I will call him with the results of testing. If needed, I will schedule a follow-up appointment.    2. Advance Care Planning   Power of   I initiated the process of advance care planning today and explained the importance of this process to the patient.  I introduced the concept of advance directives to the patient, as well. Then the patient received detailed information about the importance of designating a Health Care Power of  (HCPOA). He was also instructed to communicate with this person about their wishes for future healthcare, should he become sick and lose decision-making capacity. The patient has not previously appointed a HCPOA. After our discussion, the patient has decided to complete a HCPOA and has appointed his brother Andre Chris. I spent a total time of 16 minutes discussing this issue with the patient.          - I will call him with the results of testing. If needed, I will schedule a follow-up appointment.    Quoc Stephenson M.D.  Hematology/Oncology  [facility] - [address], [address]  [place] [address]  Phone: [phone]  Fax: [phone]

## 2020-02-26 NOTE — PROGRESS NOTES
Patient ID: Fan Chris is a 60 y.o. male.    Chief Complaint: No chief complaint on file.    CINDY Chavira is a 60 y.o. male with essential hypertension hyperlipidemia who follows up today for hypertension.  At last visit, he started hydrochlorothiazide.  He has not had any problems with this medication.  He is feeling well today.  His blood pressure in clinic is 129/72 and repeat was 128/85.  We again discussed recent labs which show hyperlipidemia and persistent thrombocytosis.  Patient is aware referral to Hematology/Oncology for the thrombocytosis.    Review of Systems   Constitutional: Negative for activity change and unexpected weight change.   HENT: Negative for hearing loss, rhinorrhea and trouble swallowing.    Eyes: Negative for discharge and visual disturbance.   Respiratory: Negative for chest tightness and wheezing.    Cardiovascular: Negative for chest pain and palpitations.   Gastrointestinal: Negative for blood in stool, constipation, diarrhea and vomiting.   Endocrine: Negative for polydipsia and polyuria.   Genitourinary: Negative for difficulty urinating, hematuria and urgency.   Musculoskeletal: Negative for arthralgias, joint swelling and neck pain.   Neurological: Negative for weakness and headaches.   Psychiatric/Behavioral: Negative for confusion and dysphoric mood.           Objective:     Vitals:    02/27/20 0811   BP: 128/85   Pulse:    Temp:         Physical Exam   Constitutional: He is oriented to person, place, and time. He appears well-developed and well-nourished. No distress.   HENT:   Head: Normocephalic and atraumatic.   Right Ear: External ear normal.   Left Ear: External ear normal.   Nose: Nose normal.   Eyes: Conjunctivae and EOM are normal.   Cardiovascular: Normal rate, regular rhythm, normal heart sounds and intact distal pulses. Exam reveals no gallop and no friction rub.   No murmur heard.  Pulmonary/Chest: Effort normal and breath sounds normal. No stridor. No respiratory  distress. He has no wheezes. He has no rales.   Neurological: He is alert and oriented to person, place, and time.   Skin: Skin is warm and dry. He is not diaphoretic.   Psychiatric: He has a normal mood and affect. His behavior is normal. Judgment and thought content normal.   Vitals reviewed.      Assessment:       1. Hyperlipidemia, unspecified hyperlipidemia type Active   2. Essential hypertension Well controlled   3. Thrombocytosis Active       Plan:         Hyperlipidemia, unspecified hyperlipidemia type  Comments:  Patient now agreeable to starting statin medication.  But he is concerned about possible side effects.  Starting on pravastatin 10 mg p.o. daily  Orders:  -     pravastatin (PRAVACHOL) 10 MG tablet; Take 1 tablet (10 mg total) by mouth once daily.  Dispense: 90 tablet; Refill: 3  -     Lipid panel; Future; Expected date: 05/26/2020  -     Comprehensive metabolic panel; Future; Expected date: 05/26/2020    Essential hypertension  Comments:  Continue current medication  Orders:  -     Comprehensive metabolic panel; Future; Expected date: 05/26/2020  -     hydroCHLOROthiazide (HYDRODIURIL) 12.5 MG Tab; Take 1 tablet (12.5 mg total) by mouth once daily.  Dispense: 90 tablet; Refill: 3    Thrombocytosis  Comments:  Patient has upcoming appointment with Hematology/Oncology to address this issue.      Will recheck lipid profile and liver enzymes in 3 months.  Return to clinic to see me in 6 months.      Warning signs discussed, patient to call with any further issues or worsening of symptoms.       Parts of the above note were dictated using a voice dictation software. Please excuse any grammatical or typographical errors.

## 2020-02-27 ENCOUNTER — LAB VISIT (OUTPATIENT)
Dept: LAB | Facility: HOSPITAL | Age: 60
End: 2020-02-27
Attending: INTERNAL MEDICINE
Payer: COMMERCIAL

## 2020-02-27 ENCOUNTER — OFFICE VISIT (OUTPATIENT)
Dept: HEMATOLOGY/ONCOLOGY | Facility: CLINIC | Age: 60
End: 2020-02-27
Payer: COMMERCIAL

## 2020-02-27 VITALS
BODY MASS INDEX: 33.78 KG/M2 | DIASTOLIC BLOOD PRESSURE: 85 MMHG | SYSTOLIC BLOOD PRESSURE: 128 MMHG | OXYGEN SATURATION: 98 % | WEIGHT: 215.19 LBS | TEMPERATURE: 98 F | HEART RATE: 95 BPM | HEIGHT: 67 IN

## 2020-02-27 VITALS
SYSTOLIC BLOOD PRESSURE: 166 MMHG | DIASTOLIC BLOOD PRESSURE: 102 MMHG | BODY MASS INDEX: 34.29 KG/M2 | TEMPERATURE: 99 F | HEART RATE: 90 BPM | RESPIRATION RATE: 18 BRPM | OXYGEN SATURATION: 99 % | WEIGHT: 218.94 LBS

## 2020-02-27 DIAGNOSIS — D75.839 THROMBOCYTOSIS: ICD-10-CM

## 2020-02-27 DIAGNOSIS — Z71.89 ADVANCE CARE PLANNING: ICD-10-CM

## 2020-02-27 DIAGNOSIS — D75.839 THROMBOCYTOSIS: Primary | ICD-10-CM

## 2020-02-27 LAB
BASOPHILS # BLD AUTO: 0.05 K/UL (ref 0–0.2)
BASOPHILS NFR BLD: 1 % (ref 0–1.9)
CRP SERPL-MCNC: 6.6 MG/L (ref 0–8.2)
DIFFERENTIAL METHOD: ABNORMAL
EOSINOPHIL # BLD AUTO: 0.2 K/UL (ref 0–0.5)
EOSINOPHIL NFR BLD: 3.4 % (ref 0–8)
ERYTHROCYTE [DISTWIDTH] IN BLOOD BY AUTOMATED COUNT: 14 % (ref 11.5–14.5)
ERYTHROCYTE [SEDIMENTATION RATE] IN BLOOD BY WESTERGREN METHOD: 12 MM/HR (ref 0–10)
FERRITIN SERPL-MCNC: 61 NG/ML (ref 20–300)
HCT VFR BLD AUTO: 43.2 % (ref 40–54)
HGB BLD-MCNC: 14.3 G/DL (ref 14–18)
IMM GRANULOCYTES # BLD AUTO: 0.03 K/UL (ref 0–0.04)
IMM GRANULOCYTES NFR BLD AUTO: 0.6 % (ref 0–0.5)
LYMPHOCYTES # BLD AUTO: 1.7 K/UL (ref 1–4.8)
LYMPHOCYTES NFR BLD: 31.8 % (ref 18–48)
MCH RBC QN AUTO: 29.2 PG (ref 27–31)
MCHC RBC AUTO-ENTMCNC: 33.1 G/DL (ref 32–36)
MCV RBC AUTO: 88 FL (ref 82–98)
MONOCYTES # BLD AUTO: 0.5 K/UL (ref 0.3–1)
MONOCYTES NFR BLD: 8.8 % (ref 4–15)
NEUTROPHILS # BLD AUTO: 2.9 K/UL (ref 1.8–7.7)
NEUTROPHILS NFR BLD: 54.4 % (ref 38–73)
NRBC BLD-RTO: 0 /100 WBC
PLATELET # BLD AUTO: 414 K/UL (ref 150–350)
PMV BLD AUTO: 8.4 FL (ref 9.2–12.9)
RBC # BLD AUTO: 4.9 M/UL (ref 4.6–6.2)
WBC # BLD AUTO: 5.25 K/UL (ref 3.9–12.7)

## 2020-02-27 PROCEDURE — 81403 MOPATH PROCEDURE LEVEL 4: CPT

## 2020-02-27 PROCEDURE — 99999 PR PBB SHADOW E&M-EST. PATIENT-LVL III: CPT | Mod: PBBFAC,,, | Performed by: INTERNAL MEDICINE

## 2020-02-27 PROCEDURE — 84238 ASSAY NONENDOCRINE RECEPTOR: CPT

## 2020-02-27 PROCEDURE — 81219 CALR GENE COM VARIANTS: CPT

## 2020-02-27 PROCEDURE — 3077F PR MOST RECENT SYSTOLIC BLOOD PRESSURE >= 140 MM HG: ICD-10-PCS | Mod: CPTII,S$GLB,, | Performed by: INTERNAL MEDICINE

## 2020-02-27 PROCEDURE — 99204 OFFICE O/P NEW MOD 45 MIN: CPT | Mod: S$GLB,,, | Performed by: INTERNAL MEDICINE

## 2020-02-27 PROCEDURE — 3080F DIAST BP >= 90 MM HG: CPT | Mod: CPTII,S$GLB,, | Performed by: INTERNAL MEDICINE

## 2020-02-27 PROCEDURE — 3080F PR MOST RECENT DIASTOLIC BLOOD PRESSURE >= 90 MM HG: ICD-10-PCS | Mod: CPTII,S$GLB,, | Performed by: INTERNAL MEDICINE

## 2020-02-27 PROCEDURE — 3008F BODY MASS INDEX DOCD: CPT | Mod: CPTII,S$GLB,, | Performed by: INTERNAL MEDICINE

## 2020-02-27 PROCEDURE — 85652 RBC SED RATE AUTOMATED: CPT

## 2020-02-27 PROCEDURE — 85025 COMPLETE CBC W/AUTO DIFF WBC: CPT

## 2020-02-27 PROCEDURE — 81207 BCR/ABL1 GENE MINOR BP: CPT

## 2020-02-27 PROCEDURE — 3008F PR BODY MASS INDEX (BMI) DOCUMENTED: ICD-10-PCS | Mod: CPTII,S$GLB,, | Performed by: INTERNAL MEDICINE

## 2020-02-27 PROCEDURE — 86140 C-REACTIVE PROTEIN: CPT

## 2020-02-27 PROCEDURE — 99204 PR OFFICE/OUTPT VISIT, NEW, LEVL IV, 45-59 MIN: ICD-10-PCS | Mod: S$GLB,,, | Performed by: INTERNAL MEDICINE

## 2020-02-27 PROCEDURE — 99999 PR PBB SHADOW E&M-EST. PATIENT-LVL III: ICD-10-PCS | Mod: PBBFAC,,, | Performed by: INTERNAL MEDICINE

## 2020-02-27 PROCEDURE — 82728 ASSAY OF FERRITIN: CPT

## 2020-02-27 PROCEDURE — 99497 ADVNCD CARE PLAN 30 MIN: CPT | Mod: S$GLB,,, | Performed by: INTERNAL MEDICINE

## 2020-02-27 PROCEDURE — 36415 COLL VENOUS BLD VENIPUNCTURE: CPT

## 2020-02-27 PROCEDURE — 3077F SYST BP >= 140 MM HG: CPT | Mod: CPTII,S$GLB,, | Performed by: INTERNAL MEDICINE

## 2020-02-27 PROCEDURE — 81270 JAK2 GENE: CPT

## 2020-02-27 PROCEDURE — 99497 PR ADVNCD CARE PLAN 30 MIN: ICD-10-PCS | Mod: S$GLB,,, | Performed by: INTERNAL MEDICINE

## 2020-02-27 NOTE — LETTER
February 27, 2020      Opal Ortiz MD  [address]  [place] [address]           [facility]  [address]  [place] [address]  Phone: [phone]          Patient: Fan Chris   MR Number: [id number]   YOB: 1960   Date of Visit: 2/27/2020       Dear Dr. Opal Ortiz:    Thank you for referring Fan Chris to me for evaluation. Attached you will find relevant portions of my assessment and plan of care.    If you have questions, please do not hesitate to call me. I look forward to following Fan Chris along with you.    Sincerely,    Quoc Stephenson MD    Enclosure  CC:  No Recipients    If you would like to receive this communication electronically, please contact [email or web address] or [phone] to request more information on [employer] Link access.    For providers and/or their staff who would like to refer a patient to [facility], please contact us through our one-stop-shop provider referral line, [facility] , at [phone].    If you feel you have received this communication in error or would no longer like to receive these types of communications, please e-mail [email or web address]

## 2020-03-02 LAB — STFR SERPL-MCNC: 3 MG/L (ref 1.8–4.6)

## 2020-03-03 LAB
DIAGNOSTIC BCR/ABL 1 RESULT: NORMAL
NARRATIVE DIAGNOSTIC REPORT-IMP: NORMAL
SPECIMEN TYPE, BCR/ABL: NORMAL

## 2020-03-06 ENCOUNTER — PATIENT MESSAGE (OUTPATIENT)
Dept: HEMATOLOGY/ONCOLOGY | Facility: CLINIC | Age: 60
End: 2020-03-06

## 2020-03-09 ENCOUNTER — PATIENT MESSAGE (OUTPATIENT)
Dept: HEMATOLOGY/ONCOLOGY | Facility: CLINIC | Age: 60
End: 2020-03-09

## 2020-03-09 LAB
MPNR  FINAL DIAGNOSIS: NORMAL
MPNR  SPECIMEN TYPE: NORMAL
MPNR RESULT: NORMAL

## 2020-04-01 ENCOUNTER — PATIENT MESSAGE (OUTPATIENT)
Dept: CARDIOLOGY | Facility: CLINIC | Age: 60
End: 2020-04-01

## 2020-04-09 ENCOUNTER — PATIENT MESSAGE (OUTPATIENT)
Dept: ADMINISTRATIVE | Facility: HOSPITAL | Age: 60
End: 2020-04-09

## 2020-04-22 ENCOUNTER — PATIENT OUTREACH (OUTPATIENT)
Dept: ADMINISTRATIVE | Facility: OTHER | Age: 60
End: 2020-04-22

## 2020-04-27 ENCOUNTER — OFFICE VISIT (OUTPATIENT)
Dept: SLEEP MEDICINE | Facility: CLINIC | Age: 60
End: 2020-04-27
Payer: COMMERCIAL

## 2020-04-27 ENCOUNTER — PATIENT MESSAGE (OUTPATIENT)
Dept: SLEEP MEDICINE | Facility: CLINIC | Age: 60
End: 2020-04-27

## 2020-04-27 DIAGNOSIS — G47.30 SLEEP APNEA, UNSPECIFIED TYPE: Primary | ICD-10-CM

## 2020-04-27 PROCEDURE — 99203 PR OFFICE/OUTPT VISIT, NEW, LEVL III, 30-44 MIN: ICD-10-PCS | Mod: 95,,, | Performed by: PSYCHIATRY & NEUROLOGY

## 2020-04-27 PROCEDURE — 99203 OFFICE O/P NEW LOW 30 MIN: CPT | Mod: 95,,, | Performed by: PSYCHIATRY & NEUROLOGY

## 2020-04-27 NOTE — LETTER
April 27, 2020      Opal Ortiz MD  [address]  [place] [address]           [facility]  [address]  [place] [address]  Phone: [phone]  Fax: [phone]          Patient: Fan Chris   MR Number: [id number]   YOB: 1960   Date of Visit: 4/27/2020       Dear Dr. Opal Ortiz:    Thank you for referring Fan Chris to me for evaluation. Attached you will find relevant portions of my assessment and plan of care.    If you have questions, please do not hesitate to call me. I look forward to following Fan Chris along with you.    Sincerely,    Radha Moreau MD    Enclosure  CC:  No Recipients    If you would like to receive this communication electronically, please contact [email or web address] or [phone] to request more information on [employer] Link access.    For providers and/or their staff who would like to refer a patient to [facility], please contact us through our one-stop-shop provider referral line, [facility], at [phone].    If you feel you have received this communication in error or would no longer like to receive these types of communications, please e-mail [email or web address]

## 2020-04-27 NOTE — PROGRESS NOTES
The patient location is: home  The chief complaint leading to consultation is: fragmented sleep  Visit type: audio  Total time spent with patient: 30  Each patient to whom he or she provides medical services by telemedicine is:  (1) informed of the relationship between the physician and patient and the respective role of any other health care provider with respect to management of the patient; and (2) notified that he or she may decline to receive medical services by telemedicine and may withdraw from such care at any time.       Fan Chris  was seen at the request of  Opal Ortiz MD for sleep evaluation.    04/27/2020 INITIAL HISTORY OF PRESENT ILLNESS:  Fan Chris is a 60 y.o. male is here to be evaluated for a sleep disorder.       CHIEF COMPLAINT:        The patient's complaints include excessive daytime sleepiness, excessive daytime fatigue, snoring,  gasping for air in sleep and interrupted sleep since  Over the last 2 years when he retired from his previous job. 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 He reported almost vomiting on awakening.  Fan Chris denied   witnessed breathing pauses.     He gained 15 lbs since [facility].  Reports difficulty falling and staying asleep.     Denies symptoms concerning for parasomnia except for occasional somniloquy.  he Denies cataplexy, sleep paralysis, hallucinations surrounding sleep time.     Fan Chris denied symptoms concerning for RLS.      EPWORTH SLEEPINESS SCALE 4/26/2020   Sitting and reading 1   Watching TV 3   Sitting, inactive in a public place (e.g. a theatre or a meeting) 1   As a passenger in a car for an hour without a break 1   Lying down to rest in the afternoon when circumstances permit 3   Sitting and talking to someone 0   Sitting quietly after a lunch without alcohol 1   In a car, while stopped for a few minutes in traffic 0   Total score 10       PHQ9 4/26/2020   Little interest or pleasure in doing things: Not at all   Feeling  down, depressed or hopeless: Several days   Trouble falling asleep, staying asleep, or sleeping too much: Nearly every day   Feeling tired or having little energy: Several days   Poor appetite or overeating: More than half the days   Feeling bad about yourself- or that you are a failure or have let yourself or family down Not at all   Trouble concentrating on things, such as reading the newspaper or watching television: Not at all   Moving or speaking so slowly that other people could have noticed. Or the opposite- being so fidgety or restless that you have been moving around a lot more than usual: Not at all   Thoughts that you would be better off dead or hurting yourself in some way: Not at all   If you indicated you have experienced any of the aforementioned problems, how difficult have these problems made it for you to do your work, take care of things at home or get along with other people? Not difficult at all   Total Score 7     GAD7 4/26/2020   1. Feeling nervous, anxious, or on edge? 0   2. Not being able to stop or control worrying? 0   3. Worrying too much about different things? 0   4. Trouble relaxing? 0   5. Being so restless that it is hard to sit still? 0   6. Becoming easily annoyed or irritable? 1   7. Feeling afraid as if something awful might happen? 0   8. If you checked off any problems, how difficult have these problems made it for you to do your work, take care of things at home, or get along with other people? 0   LEAH-7 Score 1         SLEEP ROUTINE AND LIFESTYLE 04/27/2020 :    Sleep Clinic New Patient 4/26/2020   What time do you go to bed on a week day? (Give a range) I usually nap every day 1-3 hours,  then will fall asleep watching tv when I awake I go to bed then every one to three hours I wake up , I can usually fall back aslerp   What time do you go to bed on a day off? (Give a range) Between 10 and 12 but I still get my nap in   How long does it take you to fall asleep? (Give a  range) 5 to 15 minutes usually   On average, how many times per night do you wake up? 3 to 4   How long does it take you to fall back into sleep? (Give a range) 5 to 20   What time do you wake up to start your day on a week day? (Give a range) 530 to 6   What time do you wake up to start your day on a day off? (Give a range) Same   What time do you get out of bed? (Give a range) 530 to 600   On average, how many hours do you sleep? 2 to 3 hours but 3 to 4 intervals of that   On average, how many naps do you take per day? 1-2   Rate your sleep quality from 0 to 5 (0-poor, 5-great). 0   Have you experienced:  N/a   How much weight have you lost or gained (in lbs.) in the last year? Fluctuate about 15 pounds, my diet is a concern at least half is fast food the other half is portion control and I do like junk foods   On average, how many times per night do you go to the bathroom?  2-3   Have you ever had a sleep study/CPAP machine/surgery for sleep apnea? No   Have you ever had a CPAP machine for sleep apnea? No   Have you ever had surgery for sleep apnea? No       Sleep Clinic ROS  4/26/2020   Difficulty breathing through the nose?  Yes   Sore throat or dry mouth in the morning? Yes   Irregular or very fast heart beat?  No   Shortness of breath?  Sometimes   Acid reflux? Sometimes   Body aches and pains?  Sometimes   Morning headaches? Sometimes   Dizziness? Sometimes   Mood changes?  Sometimes   Do you exercise?  Yes   Do you feel like moving your legs a lot?  No            PREVIOUS SLEEP STUDIES:     none        Social History:  Occupation:working - [employer]-retired  Bed partner:   Exercise routine: active  Caffeine:  Coffee   , cokes  , tea    Alcohol: no      DME:       PAST MEDICAL HISTORY:    Active Ambulatory Problems     Diagnosis Date Noted    Nonspecific abnormal electrocardiogram (ECG) (EKG) 10/04/2016    Cardiomyopathy 10/04/2016    Chronic combined systolic and diastolic congestive heart failure 10/04/2016     Mitral valve insufficiency 10/14/2016    Hyperlipidemia 10/14/2016    Thrombocytosis 2020     Resolved Ambulatory Problems     Diagnosis Date Noted    No Resolved Ambulatory Problems     Past Medical History:   Diagnosis Date    Asthma     Bronchitis     Hypertension     Sinusitis                 PAST SURGICAL HISTORY:    Past Surgical History:   Procedure Laterality Date    arm fracture      EYE SURGERY      TONSILLECTOMY           FAMILY HISTORY:                Family History   Problem Relation Age of Onset    Lung cancer Mother     Cancer Mother 61        lung    Hypertension Father     Hyperlipidemia Father        SOCIAL HISTORY:          Tobacco:   Social History     Tobacco Use   Smoking Status Former Smoker    Packs/day: 1.00    Years: 8.00    Pack years: 8.00    Types: Cigarettes    Last attempt to quit: 10/4/1984    Years since quittin.5   Smokeless Tobacco Never Used       alcohol use:    Social History     Substance and Sexual Activity   Alcohol Use Yes    Alcohol/week: 1.0 standard drinks    Types: 1 Cans of beer per week    Frequency: Never                   ALLERGIES:  Review of patient's allergies indicates:  No Known Allergies    CURRENT MEDICATIONS:    Current Outpatient Medications   Medication Sig Dispense Refill    aspirin (ECOTRIN) 81 MG EC tablet Take 81 mg by mouth once daily.      BEE POLLEN ORAL Take by mouth.      cetirizine (ZYRTEC) 10 MG tablet Take 10 mg by mouth once daily.      hydroCHLOROthiazide (HYDRODIURIL) 12.5 MG Tab Take 1 tablet (12.5 mg total) by mouth once daily. 90 tablet 3    pravastatin (PRAVACHOL) 10 MG tablet Take 1 tablet (10 mg total) by mouth once daily. 90 tablet 3    zinc gluconate 50 mg tablet Take 50 mg by mouth once daily.       No current facility-administered medications for this visit.                           PHYSICAL EXAM:  N/a with this virtual visit.      Using [facility]: yes      ASSESSMENT:    1. Sleep Apnea  NEC. The patient symptomatically has  excessive daytime sleepiness, snoring, excessive daytime fatigue, gasping for air in sleep, interrupted sleep and nocturia  with exam findings of previously documented  elevated body mass index. The patient has medical co-morbidities of hyperlipidemia,  which can be worsened by JACLYN. This warrants further investigation for possible obstructive sleep apnea.          PLAN:    Diagnostic: HST        More than 15 minutes of this 30 minutes visit was spent in counseling: and coordination of care.     during our discussion today, we talked about the etiology of  JACLYN as well as the potential ramifications of untreated sleep apnea, which could include daytime sleepiness, hypertension, heart disease and/or stroke.  We discussed potential treatment options, which could include weight loss, body positioning, continuous positive airway pressure (CPAP), or referral for surgical consideration. Meanwhile, he  is urged to avoid supine sleep, weight gain and alcoholic beverages since all of these can worsen JACLYN.     Precautions: The patient was advised to abstain from driving should he feel sleepy or drowsy.    Follow up: MD/NP  after the sleep study has been completed.     Thank you for allowing me the opportunity to participate in the care of your patient.    This visit summary will be sent to referring provider via inbasket

## 2020-04-28 ENCOUNTER — TELEPHONE (OUTPATIENT)
Dept: SLEEP MEDICINE | Facility: OTHER | Age: 60
End: 2020-04-28

## 2020-05-06 ENCOUNTER — TELEPHONE (OUTPATIENT)
Dept: SLEEP MEDICINE | Facility: OTHER | Age: 60
End: 2020-05-06

## 2020-05-14 ENCOUNTER — PATIENT MESSAGE (OUTPATIENT)
Dept: INTERNAL MEDICINE | Facility: CLINIC | Age: 60
End: 2020-05-14

## 2020-05-21 ENCOUNTER — HOSPITAL ENCOUNTER (OUTPATIENT)
Dept: SLEEP MEDICINE | Facility: OTHER | Age: 60
Discharge: HOME | End: 2020-05-21
Attending: PSYCHIATRY & NEUROLOGY
Payer: COMMERCIAL

## 2020-05-21 DIAGNOSIS — G47.33 OSA (OBSTRUCTIVE SLEEP APNEA): ICD-10-CM

## 2020-05-21 DIAGNOSIS — G47.30 SLEEP APNEA, UNSPECIFIED TYPE: ICD-10-CM

## 2020-05-21 PROCEDURE — 95800 SLP STDY UNATTENDED: CPT

## 2020-05-25 ENCOUNTER — LAB VISIT (OUTPATIENT)
Dept: LAB | Facility: HOSPITAL | Age: 60
End: 2020-05-25
Attending: INTERNAL MEDICINE
Payer: COMMERCIAL

## 2020-05-25 DIAGNOSIS — E78.5 HYPERLIPIDEMIA, UNSPECIFIED HYPERLIPIDEMIA TYPE: ICD-10-CM

## 2020-05-25 DIAGNOSIS — I10 ESSENTIAL HYPERTENSION: ICD-10-CM

## 2020-05-25 LAB
ALBUMIN SERPL BCP-MCNC: 4.2 G/DL (ref 3.5–5.2)
ALP SERPL-CCNC: 67 U/L (ref 55–135)
ALT SERPL W/O P-5'-P-CCNC: 28 U/L (ref 10–44)
ANION GAP SERPL CALC-SCNC: 10 MMOL/L (ref 8–16)
AST SERPL-CCNC: 25 U/L (ref 10–40)
BILIRUB SERPL-MCNC: 0.5 MG/DL (ref 0.1–1)
BUN SERPL-MCNC: 18 MG/DL (ref 6–20)
CALCIUM SERPL-MCNC: 9.2 MG/DL (ref 8.7–10.5)
CHLORIDE SERPL-SCNC: 102 MMOL/L (ref 95–110)
CHOLEST SERPL-MCNC: 300 MG/DL (ref 120–199)
CHOLEST/HDLC SERPL: 8.3 {RATIO} (ref 2–5)
CO2 SERPL-SCNC: 26 MMOL/L (ref 23–29)
CREAT SERPL-MCNC: 1.2 MG/DL (ref 0.5–1.4)
EST. GFR  (AFRICAN AMERICAN): >60 ML/MIN/1.73 M^2
EST. GFR  (NON AFRICAN AMERICAN): >60 ML/MIN/1.73 M^2
GLUCOSE SERPL-MCNC: 103 MG/DL (ref 70–110)
HDLC SERPL-MCNC: 36 MG/DL (ref 40–75)
HDLC SERPL: 12 % (ref 20–50)
LDLC SERPL CALC-MCNC: ABNORMAL MG/DL (ref 63–159)
NONHDLC SERPL-MCNC: 264 MG/DL
POTASSIUM SERPL-SCNC: 4 MMOL/L (ref 3.5–5.1)
PROT SERPL-MCNC: 7.3 G/DL (ref 6–8.4)
SODIUM SERPL-SCNC: 138 MMOL/L (ref 136–145)
TRIGL SERPL-MCNC: 448 MG/DL (ref 30–150)

## 2020-05-25 PROCEDURE — 80053 COMPREHEN METABOLIC PANEL: CPT

## 2020-05-25 PROCEDURE — 80061 LIPID PANEL: CPT

## 2020-05-25 PROCEDURE — 36415 COLL VENOUS BLD VENIPUNCTURE: CPT | Mod: PO

## 2020-05-27 PROCEDURE — 95800 PR SLEEP STUDY, UNATTENDED, RECORD HEART RATE/O2 SAT/RESP ANAL/SLEEP TIME: ICD-10-PCS | Mod: 26,,, | Performed by: PSYCHIATRY & NEUROLOGY

## 2020-05-27 PROCEDURE — 95800 SLP STDY UNATTENDED: CPT | Mod: 26,,, | Performed by: PSYCHIATRY & NEUROLOGY

## 2020-06-02 ENCOUNTER — TELEPHONE (OUTPATIENT)
Dept: SLEEP MEDICINE | Facility: CLINIC | Age: 60
End: 2020-06-02

## 2020-06-02 NOTE — TELEPHONE ENCOUNTER
----- Message from Marge Henriquez sent at 6/2/2020 10:07 AM CDT -----  Contact: pt  Type: Patient Call Back    Who called:pt    What is the request in detail:pt is calling for sleep test results. Call pt    Can the clinic reply by [facility]?    Would the patient rather a call back or a response via [facility]? call    Best call back number:[phone] (home)       Additional Information:

## 2020-06-18 ENCOUNTER — TELEPHONE (OUTPATIENT)
Dept: SLEEP MEDICINE | Facility: CLINIC | Age: 60
End: 2020-06-18

## 2020-06-18 DIAGNOSIS — G47.33 OSA (OBSTRUCTIVE SLEEP APNEA): Primary | ICD-10-CM

## 2020-06-18 NOTE — TELEPHONE ENCOUNTER
CB,      Please call the patient to inform re: recent sleep study was positive for severe obstructive sleep apnea    I can order the machine (see back in 7-8 weeks), or can schedule to discuss results.    I have not ordered anything yet - will await for his response.    PLEASE REFER THE PATIENT TO  [facility] to see the report and my explanations.      Please forward the message back to me if you could not reach the patient and could not leave a voicemail.    TY!

## 2020-06-18 NOTE — PROCEDURES
"See imported Detailed Sleep Study Reports with raw data in  "Chart Review" under the "Media tab".      (This Sleep Study was interpreted by a Board Certified Sleep Specialist who conducted an epoch-by-epoch review of the entire raw data recording.)     (The indication for this sleep study was reviewed and deemed appropriate by [facility] Practice Parameters or other reasons by a Board Certified Sleep Specialist.)  _______________________________________    PHYSICIAN INTERPRETATION AND COMMENTS: Findings are consistent with severe, obstructive sleep apnea (JACLYN)  (G47.33), by overall AHI (apnea hypopnea index). 2. There is elevated concern for sleep related hypoxemia or hypoventilation,  based on finding of large %time SpO2 <90%. This study was technically adequate to allow for interpretation.  CLINICAL HISTORY: 60 year old male presented with: 18 inch neck, BMI of 34, an [place] sleepiness score of 4, history of  hypertension and symptoms of nocturnal snoring, waking up choking and witnessed apneas. Based on the clinical history, the patient  has a high pre-test probability of having severe JACLYN.  SLEEP STUDY FINDINGS: Patient underwent a one night Home Sleep Test and by behavioral criteria, slept for approximately  5.2 hours, with a sleep latency of 6 minutes and a sleep efficiency of 70.2%. Severe sleep disordered breathing (AHI=44) is noted  based on a 4% hypopnea desaturation criteria, with indications of respiratory control instability. The patient slept supine 25.4% of the  night based on valid recording time of 5.22 hours. The apneas/hypopneas are accompanied by moderate oxygen desaturation  (percent time below 90% SpO2: 10.6%, Min SpO2: 75.3%). The average desaturation across all sleep disordered breathing events is  5.9%. Snoring occurs for 36.2% (30 dB) of the study, 13.1% is very loud. The mean pulse rate is 77 BPM, with very frequent  pulse rate variability (97 events with >= 6 BPM increase/decrease per " hour).  TREATMENT CONSIDERATIONS: Consider nasal continuous positive airway pressure (CPAP) as the initial treatment choice  for severe obstructive sleep apnea. Consider an attended in-lab CPAP titration study, given the possibility of co-morbid sleep related  hypoventilation. If CPAP trial is unsuccessful, refer to Sleep Clinic for further evaluation and management.  DISEASE MANAGEMENT CONSIDERATIONS: Definitive treatment for JACLYN is recommended. Consider Sleep Clinic. referral for JACLYN management.

## 2020-07-10 ENCOUNTER — LAB VISIT (OUTPATIENT)
Dept: PRIMARY CARE CLINIC | Facility: OTHER | Age: 60
End: 2020-07-10
Attending: INTERNAL MEDICINE
Payer: COMMERCIAL

## 2020-07-10 DIAGNOSIS — Z03.818 ENCOUNTER FOR OBSERVATION FOR SUSPECTED EXPOSURE TO OTHER BIOLOGICAL AGENTS RULED OUT: ICD-10-CM

## 2020-07-10 PROCEDURE — U0003 INFECTIOUS AGENT DETECTION BY NUCLEIC ACID (DNA OR RNA); SEVERE ACUTE RESPIRATORY SYNDROME CORONAVIRUS 2 (SARS-COV-2) (CORONAVIRUS DISEASE [COVID-19]), AMPLIFIED PROBE TECHNIQUE, MAKING USE OF HIGH THROUGHPUT TECHNOLOGIES AS DESCRIBED BY CMS-2020-01-R: HCPCS

## 2020-07-14 LAB — SARS-COV-2 RNA RESP QL NAA+PROBE: NOT DETECTED

## 2020-08-04 DIAGNOSIS — I10 ESSENTIAL HYPERTENSION: ICD-10-CM

## 2020-08-04 RX ORDER — HYDROCHLOROTHIAZIDE 12.5 MG/1
12.5 TABLET ORAL DAILY
Qty: 90 TABLET | Refills: 1 | Status: SHIPPED | OUTPATIENT
Start: 2020-08-04 | End: 2021-02-26

## 2020-08-05 ENCOUNTER — TELEPHONE (OUTPATIENT)
Dept: INTERNAL MEDICINE | Facility: CLINIC | Age: 60
End: 2020-08-05

## 2020-08-05 DIAGNOSIS — E78.5 HYPERLIPIDEMIA, UNSPECIFIED HYPERLIPIDEMIA TYPE: Primary | ICD-10-CM

## 2020-08-22 ENCOUNTER — LAB VISIT (OUTPATIENT)
Dept: LAB | Facility: HOSPITAL | Age: 60
End: 2020-08-22
Attending: INTERNAL MEDICINE
Payer: COMMERCIAL

## 2020-08-22 DIAGNOSIS — E78.5 HYPERLIPIDEMIA, UNSPECIFIED HYPERLIPIDEMIA TYPE: ICD-10-CM

## 2020-08-22 LAB
ALBUMIN SERPL BCP-MCNC: 4.2 G/DL (ref 3.5–5.2)
ALP SERPL-CCNC: 62 U/L (ref 55–135)
ALT SERPL W/O P-5'-P-CCNC: 35 U/L (ref 10–44)
ANION GAP SERPL CALC-SCNC: 9 MMOL/L (ref 8–16)
AST SERPL-CCNC: 29 U/L (ref 10–40)
BASOPHILS # BLD AUTO: 0.04 K/UL (ref 0–0.2)
BASOPHILS NFR BLD: 0.8 % (ref 0–1.9)
BILIRUB SERPL-MCNC: 0.9 MG/DL (ref 0.1–1)
BUN SERPL-MCNC: 23 MG/DL (ref 6–20)
CALCIUM SERPL-MCNC: 9.1 MG/DL (ref 8.7–10.5)
CHLORIDE SERPL-SCNC: 100 MMOL/L (ref 95–110)
CHOLEST SERPL-MCNC: 256 MG/DL (ref 120–199)
CHOLEST/HDLC SERPL: 7.1 {RATIO} (ref 2–5)
CO2 SERPL-SCNC: 29 MMOL/L (ref 23–29)
CREAT SERPL-MCNC: 1.5 MG/DL (ref 0.5–1.4)
DIFFERENTIAL METHOD: ABNORMAL
EOSINOPHIL # BLD AUTO: 0.2 K/UL (ref 0–0.5)
EOSINOPHIL NFR BLD: 3.4 % (ref 0–8)
ERYTHROCYTE [DISTWIDTH] IN BLOOD BY AUTOMATED COUNT: 14.3 % (ref 11.5–14.5)
EST. GFR  (AFRICAN AMERICAN): 58.5 ML/MIN/1.73 M^2
EST. GFR  (NON AFRICAN AMERICAN): 50.6 ML/MIN/1.73 M^2
GLUCOSE SERPL-MCNC: 99 MG/DL (ref 70–110)
HCT VFR BLD AUTO: 48.4 % (ref 40–54)
HDLC SERPL-MCNC: 36 MG/DL (ref 40–75)
HDLC SERPL: 14.1 % (ref 20–50)
HGB BLD-MCNC: 16 G/DL (ref 14–18)
IMM GRANULOCYTES # BLD AUTO: 0.02 K/UL (ref 0–0.04)
IMM GRANULOCYTES NFR BLD AUTO: 0.4 % (ref 0–0.5)
LDLC SERPL CALC-MCNC: 160.6 MG/DL (ref 63–159)
LYMPHOCYTES # BLD AUTO: 1.7 K/UL (ref 1–4.8)
LYMPHOCYTES NFR BLD: 31.7 % (ref 18–48)
MCH RBC QN AUTO: 30.4 PG (ref 27–31)
MCHC RBC AUTO-ENTMCNC: 33.1 G/DL (ref 32–36)
MCV RBC AUTO: 92 FL (ref 82–98)
MONOCYTES # BLD AUTO: 0.5 K/UL (ref 0.3–1)
MONOCYTES NFR BLD: 9.4 % (ref 4–15)
NEUTROPHILS # BLD AUTO: 2.9 K/UL (ref 1.8–7.7)
NEUTROPHILS NFR BLD: 54.3 % (ref 38–73)
NONHDLC SERPL-MCNC: 220 MG/DL
NRBC BLD-RTO: 0 /100 WBC
PLATELET # BLD AUTO: 372 K/UL (ref 150–350)
PMV BLD AUTO: 8.9 FL (ref 9.2–12.9)
POTASSIUM SERPL-SCNC: 3.3 MMOL/L (ref 3.5–5.1)
PROT SERPL-MCNC: 7.3 G/DL (ref 6–8.4)
RBC # BLD AUTO: 5.26 M/UL (ref 4.6–6.2)
SODIUM SERPL-SCNC: 138 MMOL/L (ref 136–145)
TRIGL SERPL-MCNC: 297 MG/DL (ref 30–150)
WBC # BLD AUTO: 5.24 K/UL (ref 3.9–12.7)

## 2020-08-22 PROCEDURE — 36415 COLL VENOUS BLD VENIPUNCTURE: CPT | Mod: PO

## 2020-08-22 PROCEDURE — 80061 LIPID PANEL: CPT

## 2020-08-22 PROCEDURE — 85025 COMPLETE CBC W/AUTO DIFF WBC: CPT

## 2020-08-22 PROCEDURE — 80053 COMPREHEN METABOLIC PANEL: CPT

## 2020-08-24 DIAGNOSIS — E87.6 HYPOKALEMIA: Primary | ICD-10-CM

## 2020-08-24 RX ORDER — POTASSIUM CHLORIDE 20 MEQ/1
20 TABLET, EXTENDED RELEASE ORAL DAILY
Qty: 7 TABLET | Refills: 0 | Status: SHIPPED | OUTPATIENT
Start: 2020-08-24 | End: 2020-08-31

## 2020-08-26 ENCOUNTER — OFFICE VISIT (OUTPATIENT)
Dept: INTERNAL MEDICINE | Facility: CLINIC | Age: 60
End: 2020-08-26
Payer: COMMERCIAL

## 2020-08-26 VITALS
SYSTOLIC BLOOD PRESSURE: 122 MMHG | TEMPERATURE: 99 F | HEIGHT: 67 IN | DIASTOLIC BLOOD PRESSURE: 74 MMHG | BODY MASS INDEX: 33.71 KG/M2 | HEART RATE: 86 BPM | OXYGEN SATURATION: 98 % | WEIGHT: 214.75 LBS

## 2020-08-26 DIAGNOSIS — R50.9 FEVER, UNSPECIFIED FEVER CAUSE: ICD-10-CM

## 2020-08-26 DIAGNOSIS — Z00.00 ANNUAL PHYSICAL EXAM: ICD-10-CM

## 2020-08-26 DIAGNOSIS — I10 ESSENTIAL HYPERTENSION: ICD-10-CM

## 2020-08-26 DIAGNOSIS — Z12.5 PROSTATE CANCER SCREENING: ICD-10-CM

## 2020-08-26 DIAGNOSIS — E78.5 HYPERLIPIDEMIA, UNSPECIFIED HYPERLIPIDEMIA TYPE: ICD-10-CM

## 2020-08-26 DIAGNOSIS — E66.9 OBESITY (BMI 30-39.9): ICD-10-CM

## 2020-08-26 DIAGNOSIS — G47.33 OSA (OBSTRUCTIVE SLEEP APNEA): ICD-10-CM

## 2020-08-26 PROCEDURE — 3074F SYST BP LT 130 MM HG: CPT | Mod: CPTII,S$GLB,, | Performed by: INTERNAL MEDICINE

## 2020-08-26 PROCEDURE — 99999 PR PBB SHADOW E&M-EST. PATIENT-LVL III: ICD-10-PCS | Mod: PBBFAC,,, | Performed by: INTERNAL MEDICINE

## 2020-08-26 PROCEDURE — 3078F DIAST BP <80 MM HG: CPT | Mod: CPTII,S$GLB,, | Performed by: INTERNAL MEDICINE

## 2020-08-26 PROCEDURE — 3008F PR BODY MASS INDEX (BMI) DOCUMENTED: ICD-10-PCS | Mod: CPTII,S$GLB,, | Performed by: INTERNAL MEDICINE

## 2020-08-26 PROCEDURE — U0003 INFECTIOUS AGENT DETECTION BY NUCLEIC ACID (DNA OR RNA); SEVERE ACUTE RESPIRATORY SYNDROME CORONAVIRUS 2 (SARS-COV-2) (CORONAVIRUS DISEASE [COVID-19]), AMPLIFIED PROBE TECHNIQUE, MAKING USE OF HIGH THROUGHPUT TECHNOLOGIES AS DESCRIBED BY CMS-2020-01-R: HCPCS

## 2020-08-26 PROCEDURE — 99214 PR OFFICE/OUTPT VISIT, EST, LEVL IV, 30-39 MIN: ICD-10-PCS | Mod: S$GLB,,, | Performed by: INTERNAL MEDICINE

## 2020-08-26 PROCEDURE — 99214 OFFICE O/P EST MOD 30 MIN: CPT | Mod: S$GLB,,, | Performed by: INTERNAL MEDICINE

## 2020-08-26 PROCEDURE — 3078F PR MOST RECENT DIASTOLIC BLOOD PRESSURE < 80 MM HG: ICD-10-PCS | Mod: CPTII,S$GLB,, | Performed by: INTERNAL MEDICINE

## 2020-08-26 PROCEDURE — 3074F PR MOST RECENT SYSTOLIC BLOOD PRESSURE < 130 MM HG: ICD-10-PCS | Mod: CPTII,S$GLB,, | Performed by: INTERNAL MEDICINE

## 2020-08-26 PROCEDURE — 3008F BODY MASS INDEX DOCD: CPT | Mod: CPTII,S$GLB,, | Performed by: INTERNAL MEDICINE

## 2020-08-26 PROCEDURE — 99999 PR PBB SHADOW E&M-EST. PATIENT-LVL III: CPT | Mod: PBBFAC,,, | Performed by: INTERNAL MEDICINE

## 2020-08-26 RX ORDER — PRAVASTATIN SODIUM 40 MG/1
40 TABLET ORAL DAILY
Qty: 90 TABLET | Refills: 3 | Status: SHIPPED | OUTPATIENT
Start: 2020-08-26 | End: 2021-02-26

## 2020-08-26 NOTE — PROGRESS NOTES
Patient ID: Fan Chris is a 60 y.o. male.    Chief Complaint: Hyperlipidemia    HPI Fan is a 60 y.o. male hypertension, hyperlipidemia, and possible sleep apnea who presents for routine follow-up of medical conditions.  He does mention having a headache and sensation like a low-grade temperature earlier today.  He has not followed up again with sleep medicine.  He is unsure about using a CPAP machine regularly.  He would prefer not to.  He was working on diet and exercise and had lost some weight.  As a result, he was hoping he could cure himself of sleep apnea.  However, unfortunately he started eating sweets again and exercising less.  He has gained some weight back.  He has been compliant with his pravastatin medication.  He recently began taking 2 tablets of pravastatin for total of 20 mg daily.  His LDL reduced to 160.  He is compliant with his hydrochlorothiazide 12.5 mg daily for treatment of hypertension.  Blood pressure well controlled 122/74.  We reviewed his recent labs which show mildly low potassium and reduced kidney function.  He has not picked up the potassium prescription.  He reports that he drinks lots of water throughout the day.    Review of Systems   Constitutional: Negative for activity change and unexpected weight change.   HENT: Negative for hearing loss, rhinorrhea and trouble swallowing.    Eyes: Negative for discharge and visual disturbance.   Respiratory: Negative for chest tightness and wheezing.    Cardiovascular: Negative for chest pain and palpitations.   Gastrointestinal: Negative for blood in stool, constipation, diarrhea and vomiting.   Endocrine: Negative for polydipsia and polyuria.   Genitourinary: Negative for difficulty urinating, hematuria and urgency.   Musculoskeletal: Negative for arthralgias, joint swelling and neck pain.   Neurological: Negative for weakness and headaches.   Psychiatric/Behavioral: Negative for confusion and dysphoric mood.   All other systems  reviewed and are negative.        Objective:     Vitals:    08/26/20 1440   BP: 122/74   Pulse: 86   Temp: 98.8 °F (37.1 °C)        Physical Exam  Vitals signs reviewed.   Constitutional:       General: He is not in acute distress.     Appearance: Normal appearance. He is well-developed. He is obese. He is not ill-appearing or diaphoretic.   HENT:      Head: Normocephalic and atraumatic.      Right Ear: External ear normal.      Left Ear: External ear normal.      Nose: Nose normal.   Eyes:      Extraocular Movements: Extraocular movements intact.      Conjunctiva/sclera: Conjunctivae normal.   Cardiovascular:      Rate and Rhythm: Normal rate and regular rhythm.      Heart sounds: Normal heart sounds. No murmur. No friction rub. No gallop.    Pulmonary:      Effort: Pulmonary effort is normal. No respiratory distress.      Breath sounds: Normal breath sounds. No stridor. No wheezing, rhonchi or rales.   Skin:     General: Skin is warm and dry.   Neurological:      General: No focal deficit present.      Mental Status: He is alert and oriented to person, place, and time. Mental status is at baseline.   Psychiatric:         Mood and Affect: Mood normal.         Behavior: Behavior normal.         Thought Content: Thought content normal.         Judgment: Judgment normal.         Assessment:       1. Essential hypertension Well controlled   2. Hyperlipidemia, unspecified hyperlipidemia type    3. Fever, unspecified fever cause    4. Obesity (BMI 30-39.9) Active   5. JACLYN (obstructive sleep apnea) Active   6. Annual physical exam    7. Prostate cancer screening        Plan:         Essential hypertension  Comments:  Continue current medication.    Hyperlipidemia, unspecified hyperlipidemia type  Comments:  improving. Increase to pravastatin 40 mg daily  Orders:  -     pravastatin (PRAVACHOL) 40 MG tablet; Take 1 tablet (40 mg total) by mouth once daily.  Dispense: 90 tablet; Refill: 3  -     Lipid Panel; Future; Expected  date: 11/26/2020    Fever, unspecified fever cause  Comments:  No fever here in clinic. Covid test pending.   Orders:  -     COVID-19 Routine Screening    Obesity (BMI 30-39.9)  Comments:  encouraged return to regular exercise    JACLYN (obstructive sleep apnea)  Comments:  Recommend that if 20 lbs not lost in next 6 months, he go back to sleep medicine and treat JACLYN. Advised on risk of untreated JACLYN such as pulm HTN    Annual physical exam  -     CBC auto differential; Future; Expected date: 02/26/2021  -     Comprehensive metabolic panel; Future; Expected date: 02/26/2021  -     Hemoglobin A1C; Future; Expected date: 02/26/2021  -     Lipid Panel; Future; Expected date: 02/26/2021  -     TSH; Future; Expected date: 02/26/2021    Prostate cancer screening  -     PSA, Screening; Future; Expected date: 02/26/2021        RTC 6 months for annual exam    Warning signs discussed, patient to call with any further issues or worsening of symptoms.       Parts of the above note were dictated using a voice dictation software. Please excuse any grammatical or typographical errors.

## 2020-08-27 ENCOUNTER — TELEPHONE (OUTPATIENT)
Dept: SLEEP MEDICINE | Facility: CLINIC | Age: 60
End: 2020-08-27

## 2020-08-27 LAB — SARS-COV-2 RNA RESP QL NAA+PROBE: NOT DETECTED

## 2020-09-10 ENCOUNTER — PATIENT MESSAGE (OUTPATIENT)
Dept: INTERNAL MEDICINE | Facility: CLINIC | Age: 60
End: 2020-09-10

## 2020-09-11 ENCOUNTER — TELEPHONE (OUTPATIENT)
Dept: INTERNAL MEDICINE | Facility: CLINIC | Age: 60
End: 2020-09-11

## 2020-09-11 DIAGNOSIS — B02.9 HERPES ZOSTER WITHOUT COMPLICATION: Primary | ICD-10-CM

## 2020-09-11 RX ORDER — VALACYCLOVIR HYDROCHLORIDE 1 G/1
1000 TABLET, FILM COATED ORAL 3 TIMES DAILY
Qty: 21 TABLET | Refills: 0 | Status: SHIPPED | OUTPATIENT
Start: 2020-09-11 | End: 2021-02-26

## 2020-09-11 RX ORDER — GABAPENTIN 300 MG/1
300 CAPSULE ORAL 3 TIMES DAILY PRN
Qty: 90 CAPSULE | Refills: 0 | Status: SHIPPED | OUTPATIENT
Start: 2020-09-11 | End: 2021-02-26

## 2020-09-11 NOTE — TELEPHONE ENCOUNTER
----- Message from Britt Noble sent at 9/11/2020  2:15 PM CDT -----  Contact: [phone]/Self  Type:  Needs Medical Advice    Who Called: Pt  Symptoms (please be specific): advise on shingles    How long has patient had these symptoms:  -  Pharmacy name and phone #:  -  Would the patient rather a call back or a response via [facility]? Call back   Best Call Back Number: [phone]  Additional Information: Pt states they are not leaking , is there anything he can take or do. Pt wants to speak with Dr. Ortiz

## 2020-09-11 NOTE — TELEPHONE ENCOUNTER
I called the patient there was no answer, I left a message for him to call the office to be scheduled.

## 2020-09-11 NOTE — TELEPHONE ENCOUNTER
I spoke to the patient several times today about being scheduled. He declined an urgent care for tomorrow and he could not come in on Tuesday. I informed the patient that I will schedule him for the next available appointment he agreed and Dr. Ortiz has sent in some gabapentin, and some valtrex to hep until his appointment.

## 2020-11-25 ENCOUNTER — LAB VISIT (OUTPATIENT)
Dept: PRIMARY CARE CLINIC | Facility: OTHER | Age: 60
End: 2020-11-25
Attending: INTERNAL MEDICINE
Payer: COMMERCIAL

## 2020-11-25 DIAGNOSIS — Z03.818 ENCOUNTER FOR OBSERVATION FOR SUSPECTED EXPOSURE TO OTHER BIOLOGICAL AGENTS RULED OUT: ICD-10-CM

## 2020-11-25 PROCEDURE — U0003 INFECTIOUS AGENT DETECTION BY NUCLEIC ACID (DNA OR RNA); SEVERE ACUTE RESPIRATORY SYNDROME CORONAVIRUS 2 (SARS-COV-2) (CORONAVIRUS DISEASE [COVID-19]), AMPLIFIED PROBE TECHNIQUE, MAKING USE OF HIGH THROUGHPUT TECHNOLOGIES AS DESCRIBED BY CMS-2020-01-R: HCPCS

## 2020-11-27 ENCOUNTER — LAB VISIT (OUTPATIENT)
Dept: LAB | Facility: HOSPITAL | Age: 60
End: 2020-11-27
Attending: INTERNAL MEDICINE
Payer: COMMERCIAL

## 2020-11-27 DIAGNOSIS — E78.5 HYPERLIPIDEMIA, UNSPECIFIED HYPERLIPIDEMIA TYPE: ICD-10-CM

## 2020-11-27 LAB
CHOLEST SERPL-MCNC: 272 MG/DL (ref 120–199)
CHOLEST/HDLC SERPL: 7.4 {RATIO} (ref 2–5)
HDLC SERPL-MCNC: 37 MG/DL (ref 40–75)
HDLC SERPL: 13.6 % (ref 20–50)
LDLC SERPL CALC-MCNC: 177.2 MG/DL (ref 63–159)
NONHDLC SERPL-MCNC: 235 MG/DL
SARS-COV-2 RNA RESP QL NAA+PROBE: NOT DETECTED
TRIGL SERPL-MCNC: 289 MG/DL (ref 30–150)

## 2020-11-27 PROCEDURE — 36415 COLL VENOUS BLD VENIPUNCTURE: CPT | Mod: PO

## 2020-11-27 PROCEDURE — 80061 LIPID PANEL: CPT

## 2021-02-08 ENCOUNTER — PATIENT MESSAGE (OUTPATIENT)
Dept: INTERNAL MEDICINE | Facility: CLINIC | Age: 61
End: 2021-02-08

## 2021-02-22 ENCOUNTER — LAB VISIT (OUTPATIENT)
Dept: LAB | Facility: HOSPITAL | Age: 61
End: 2021-02-22
Attending: INTERNAL MEDICINE
Payer: COMMERCIAL

## 2021-02-22 DIAGNOSIS — Z12.5 PROSTATE CANCER SCREENING: ICD-10-CM

## 2021-02-22 DIAGNOSIS — Z00.00 ANNUAL PHYSICAL EXAM: ICD-10-CM

## 2021-02-22 PROCEDURE — 84153 ASSAY OF PSA TOTAL: CPT

## 2021-02-22 PROCEDURE — 80061 LIPID PANEL: CPT

## 2021-02-22 PROCEDURE — 84443 ASSAY THYROID STIM HORMONE: CPT

## 2021-02-22 PROCEDURE — 36415 COLL VENOUS BLD VENIPUNCTURE: CPT | Mod: PO

## 2021-02-22 PROCEDURE — 83036 HEMOGLOBIN GLYCOSYLATED A1C: CPT

## 2021-02-22 PROCEDURE — 85025 COMPLETE CBC W/AUTO DIFF WBC: CPT

## 2021-02-23 LAB
BASOPHILS # BLD AUTO: 0.06 K/UL (ref 0–0.2)
BASOPHILS NFR BLD: 1.1 % (ref 0–1.9)
CHOLEST SERPL-MCNC: 289 MG/DL (ref 120–199)
CHOLEST/HDLC SERPL: 7.4 {RATIO} (ref 2–5)
COMPLEXED PSA SERPL-MCNC: 1.1 NG/ML (ref 0–4)
DIFFERENTIAL METHOD: ABNORMAL
EOSINOPHIL # BLD AUTO: 0.2 K/UL (ref 0–0.5)
EOSINOPHIL NFR BLD: 4.3 % (ref 0–8)
ERYTHROCYTE [DISTWIDTH] IN BLOOD BY AUTOMATED COUNT: 15.4 % (ref 11.5–14.5)
ESTIMATED AVG GLUCOSE: 111 MG/DL (ref 68–131)
HBA1C MFR BLD: 5.5 % (ref 4–5.6)
HCT VFR BLD AUTO: 48.7 % (ref 40–54)
HDLC SERPL-MCNC: 39 MG/DL (ref 40–75)
HDLC SERPL: 13.5 % (ref 20–50)
HGB BLD-MCNC: 15.4 G/DL (ref 14–18)
IMM GRANULOCYTES # BLD AUTO: 0.03 K/UL (ref 0–0.04)
IMM GRANULOCYTES NFR BLD AUTO: 0.6 % (ref 0–0.5)
LDLC SERPL CALC-MCNC: 214 MG/DL (ref 63–159)
LYMPHOCYTES # BLD AUTO: 1.6 K/UL (ref 1–4.8)
LYMPHOCYTES NFR BLD: 29.8 % (ref 18–48)
MCH RBC QN AUTO: 29.9 PG (ref 27–31)
MCHC RBC AUTO-ENTMCNC: 31.6 G/DL (ref 32–36)
MCV RBC AUTO: 95 FL (ref 82–98)
MONOCYTES # BLD AUTO: 0.5 K/UL (ref 0.3–1)
MONOCYTES NFR BLD: 8.9 % (ref 4–15)
NEUTROPHILS # BLD AUTO: 2.9 K/UL (ref 1.8–7.7)
NEUTROPHILS NFR BLD: 55.3 % (ref 38–73)
NONHDLC SERPL-MCNC: 250 MG/DL
NRBC BLD-RTO: 0 /100 WBC
PLATELET # BLD AUTO: 394 K/UL (ref 150–350)
PMV BLD AUTO: 8.8 FL (ref 9.2–12.9)
RBC # BLD AUTO: 5.15 M/UL (ref 4.6–6.2)
TRIGL SERPL-MCNC: 180 MG/DL (ref 30–150)
WBC # BLD AUTO: 5.31 K/UL (ref 3.9–12.7)

## 2021-02-24 LAB — TSH SERPL DL<=0.005 MIU/L-ACNC: 1.11 UIU/ML (ref 0.4–4)

## 2021-02-26 ENCOUNTER — OFFICE VISIT (OUTPATIENT)
Dept: INTERNAL MEDICINE | Facility: CLINIC | Age: 61
End: 2021-02-26
Payer: COMMERCIAL

## 2021-02-26 VITALS
OXYGEN SATURATION: 97 % | TEMPERATURE: 98 F | DIASTOLIC BLOOD PRESSURE: 80 MMHG | WEIGHT: 207.25 LBS | BODY MASS INDEX: 32.53 KG/M2 | HEIGHT: 67 IN | SYSTOLIC BLOOD PRESSURE: 134 MMHG | HEART RATE: 91 BPM

## 2021-02-26 DIAGNOSIS — E78.5 HYPERLIPIDEMIA, UNSPECIFIED HYPERLIPIDEMIA TYPE: ICD-10-CM

## 2021-02-26 DIAGNOSIS — G47.33 OSA (OBSTRUCTIVE SLEEP APNEA): ICD-10-CM

## 2021-02-26 DIAGNOSIS — Z00.00 ANNUAL PHYSICAL EXAM: ICD-10-CM

## 2021-02-26 DIAGNOSIS — I10 ESSENTIAL HYPERTENSION: ICD-10-CM

## 2021-02-26 DIAGNOSIS — H61.23 BILATERAL IMPACTED CERUMEN: ICD-10-CM

## 2021-02-26 DIAGNOSIS — Z23 NEED FOR TETANUS BOOSTER: ICD-10-CM

## 2021-02-26 DIAGNOSIS — E66.9 OBESITY (BMI 30-39.9): ICD-10-CM

## 2021-02-26 PROCEDURE — 3008F PR BODY MASS INDEX (BMI) DOCUMENTED: ICD-10-PCS | Mod: CPTII,S$GLB,, | Performed by: INTERNAL MEDICINE

## 2021-02-26 PROCEDURE — 3008F BODY MASS INDEX DOCD: CPT | Mod: CPTII,S$GLB,, | Performed by: INTERNAL MEDICINE

## 2021-02-26 PROCEDURE — 90714 TD VACC NO PRESV 7 YRS+ IM: CPT | Mod: S$GLB,,, | Performed by: INTERNAL MEDICINE

## 2021-02-26 PROCEDURE — 3075F PR MOST RECENT SYSTOLIC BLOOD PRESS GE 130-139MM HG: ICD-10-PCS | Mod: CPTII,S$GLB,, | Performed by: INTERNAL MEDICINE

## 2021-02-26 PROCEDURE — 99396 PR PREVENTIVE VISIT,EST,40-64: ICD-10-PCS | Mod: 25,S$GLB,, | Performed by: INTERNAL MEDICINE

## 2021-02-26 PROCEDURE — 1126F PR PAIN SEVERITY QUANTIFIED, NO PAIN PRESENT: ICD-10-PCS | Mod: S$GLB,,, | Performed by: INTERNAL MEDICINE

## 2021-02-26 PROCEDURE — 99396 PREV VISIT EST AGE 40-64: CPT | Mod: 25,S$GLB,, | Performed by: INTERNAL MEDICINE

## 2021-02-26 PROCEDURE — 3079F DIAST BP 80-89 MM HG: CPT | Mod: CPTII,S$GLB,, | Performed by: INTERNAL MEDICINE

## 2021-02-26 PROCEDURE — 3079F PR MOST RECENT DIASTOLIC BLOOD PRESSURE 80-89 MM HG: ICD-10-PCS | Mod: CPTII,S$GLB,, | Performed by: INTERNAL MEDICINE

## 2021-02-26 PROCEDURE — 99999 PR PBB SHADOW E&M-EST. PATIENT-LVL IV: CPT | Mod: PBBFAC,,, | Performed by: INTERNAL MEDICINE

## 2021-02-26 PROCEDURE — 90471 IMMUNIZATION ADMIN: CPT | Mod: S$GLB,,, | Performed by: INTERNAL MEDICINE

## 2021-02-26 PROCEDURE — 90714 TD VACCINE GREATER THAN OR EQUAL TO 7YO PRESERVATIVE FREE IM: ICD-10-PCS | Mod: S$GLB,,, | Performed by: INTERNAL MEDICINE

## 2021-02-26 PROCEDURE — 3075F SYST BP GE 130 - 139MM HG: CPT | Mod: CPTII,S$GLB,, | Performed by: INTERNAL MEDICINE

## 2021-02-26 PROCEDURE — 99999 PR PBB SHADOW E&M-EST. PATIENT-LVL IV: ICD-10-PCS | Mod: PBBFAC,,, | Performed by: INTERNAL MEDICINE

## 2021-02-26 PROCEDURE — 1126F AMNT PAIN NOTED NONE PRSNT: CPT | Mod: S$GLB,,, | Performed by: INTERNAL MEDICINE

## 2021-02-26 PROCEDURE — 90471 TD VACCINE GREATER THAN OR EQUAL TO 7YO PRESERVATIVE FREE IM: ICD-10-PCS | Mod: S$GLB,,, | Performed by: INTERNAL MEDICINE

## 2021-02-26 RX ORDER — ATORVASTATIN CALCIUM 40 MG/1
40 TABLET, FILM COATED ORAL DAILY
Qty: 90 TABLET | Refills: 3 | Status: SHIPPED | OUTPATIENT
Start: 2021-02-26 | End: 2022-08-15

## 2021-04-16 ENCOUNTER — PATIENT MESSAGE (OUTPATIENT)
Dept: RESEARCH | Facility: HOSPITAL | Age: 61
End: 2021-04-16

## 2021-04-24 ENCOUNTER — PATIENT MESSAGE (OUTPATIENT)
Dept: INTERNAL MEDICINE | Facility: CLINIC | Age: 61
End: 2021-04-24

## 2021-04-26 ENCOUNTER — PATIENT MESSAGE (OUTPATIENT)
Dept: INTERNAL MEDICINE | Facility: CLINIC | Age: 61
End: 2021-04-26

## 2021-04-26 ENCOUNTER — TELEPHONE (OUTPATIENT)
Dept: INTERNAL MEDICINE | Facility: CLINIC | Age: 61
End: 2021-04-26

## 2021-04-27 ENCOUNTER — OFFICE VISIT (OUTPATIENT)
Dept: INTERNAL MEDICINE | Facility: CLINIC | Age: 61
End: 2021-04-27
Payer: COMMERCIAL

## 2021-04-27 VITALS
OXYGEN SATURATION: 98 % | HEIGHT: 67 IN | TEMPERATURE: 98 F | HEART RATE: 81 BPM | DIASTOLIC BLOOD PRESSURE: 88 MMHG | SYSTOLIC BLOOD PRESSURE: 146 MMHG | WEIGHT: 204.38 LBS | BODY MASS INDEX: 32.08 KG/M2

## 2021-04-27 DIAGNOSIS — J01.90 ACUTE BACTERIAL SINUSITIS: ICD-10-CM

## 2021-04-27 DIAGNOSIS — B96.89 ACUTE BACTERIAL SINUSITIS: ICD-10-CM

## 2021-04-27 DIAGNOSIS — H61.23 BILATERAL IMPACTED CERUMEN: ICD-10-CM

## 2021-04-27 PROCEDURE — 99214 OFFICE O/P EST MOD 30 MIN: CPT | Mod: S$GLB,,, | Performed by: INTERNAL MEDICINE

## 2021-04-27 PROCEDURE — 3008F BODY MASS INDEX DOCD: CPT | Mod: CPTII,S$GLB,, | Performed by: INTERNAL MEDICINE

## 2021-04-27 PROCEDURE — 3008F PR BODY MASS INDEX (BMI) DOCUMENTED: ICD-10-PCS | Mod: CPTII,S$GLB,, | Performed by: INTERNAL MEDICINE

## 2021-04-27 PROCEDURE — 1126F PR PAIN SEVERITY QUANTIFIED, NO PAIN PRESENT: ICD-10-PCS | Mod: S$GLB,,, | Performed by: INTERNAL MEDICINE

## 2021-04-27 PROCEDURE — 1126F AMNT PAIN NOTED NONE PRSNT: CPT | Mod: S$GLB,,, | Performed by: INTERNAL MEDICINE

## 2021-04-27 PROCEDURE — 99214 PR OFFICE/OUTPT VISIT, EST, LEVL IV, 30-39 MIN: ICD-10-PCS | Mod: S$GLB,,, | Performed by: INTERNAL MEDICINE

## 2021-04-27 PROCEDURE — 99999 PR PBB SHADOW E&M-EST. PATIENT-LVL III: CPT | Mod: PBBFAC,,, | Performed by: INTERNAL MEDICINE

## 2021-04-27 PROCEDURE — 99999 PR PBB SHADOW E&M-EST. PATIENT-LVL III: ICD-10-PCS | Mod: PBBFAC,,, | Performed by: INTERNAL MEDICINE

## 2021-04-27 RX ORDER — METHYLPREDNISOLONE 4 MG/1
TABLET ORAL
Qty: 21 TABLET | Refills: 0 | Status: SHIPPED | OUTPATIENT
Start: 2021-04-27 | End: 2021-05-18

## 2021-04-27 RX ORDER — AMOXICILLIN AND CLAVULANATE POTASSIUM 875; 125 MG/1; MG/1
1 TABLET, FILM COATED ORAL EVERY 12 HOURS
Qty: 14 TABLET | Refills: 0 | Status: SHIPPED | OUTPATIENT
Start: 2021-04-27 | End: 2021-05-04

## 2021-04-27 RX ORDER — FLUTICASONE PROPIONATE 50 MCG
2 SPRAY, SUSPENSION (ML) NASAL
COMMUNITY
End: 2023-10-02 | Stop reason: SDUPTHER

## 2021-05-02 ENCOUNTER — PATIENT MESSAGE (OUTPATIENT)
Dept: INTERNAL MEDICINE | Facility: CLINIC | Age: 61
End: 2021-05-02

## 2021-05-03 ENCOUNTER — PATIENT MESSAGE (OUTPATIENT)
Dept: INTERNAL MEDICINE | Facility: CLINIC | Age: 61
End: 2021-05-03

## 2021-05-21 ENCOUNTER — LAB VISIT (OUTPATIENT)
Dept: LAB | Facility: HOSPITAL | Age: 61
End: 2021-05-21
Attending: INTERNAL MEDICINE
Payer: COMMERCIAL

## 2021-05-21 DIAGNOSIS — E78.5 HYPERLIPIDEMIA, UNSPECIFIED HYPERLIPIDEMIA TYPE: ICD-10-CM

## 2021-05-21 LAB
CHOLEST SERPL-MCNC: 199 MG/DL (ref 120–199)
CHOLEST/HDLC SERPL: 4.6 {RATIO} (ref 2–5)
HDLC SERPL-MCNC: 43 MG/DL (ref 40–75)
HDLC SERPL: 21.6 % (ref 20–50)
LDLC SERPL CALC-MCNC: 127.2 MG/DL (ref 63–159)
NONHDLC SERPL-MCNC: 156 MG/DL
TRIGL SERPL-MCNC: 144 MG/DL (ref 30–150)

## 2021-05-21 PROCEDURE — 80061 LIPID PANEL: CPT | Performed by: INTERNAL MEDICINE

## 2021-05-21 PROCEDURE — 36415 COLL VENOUS BLD VENIPUNCTURE: CPT | Mod: PO | Performed by: INTERNAL MEDICINE

## 2021-05-26 ENCOUNTER — OFFICE VISIT (OUTPATIENT)
Dept: INTERNAL MEDICINE | Facility: CLINIC | Age: 61
End: 2021-05-26
Payer: COMMERCIAL

## 2021-05-26 VITALS
WEIGHT: 209 LBS | SYSTOLIC BLOOD PRESSURE: 145 MMHG | BODY MASS INDEX: 32.73 KG/M2 | OXYGEN SATURATION: 96 % | DIASTOLIC BLOOD PRESSURE: 85 MMHG | HEART RATE: 84 BPM

## 2021-05-26 DIAGNOSIS — I10 ESSENTIAL HYPERTENSION: Primary | ICD-10-CM

## 2021-05-26 DIAGNOSIS — G47.33 OSA (OBSTRUCTIVE SLEEP APNEA): ICD-10-CM

## 2021-05-26 DIAGNOSIS — E78.5 HYPERLIPIDEMIA, UNSPECIFIED HYPERLIPIDEMIA TYPE: ICD-10-CM

## 2021-05-26 DIAGNOSIS — E66.9 OBESITY (BMI 30-39.9): ICD-10-CM

## 2021-05-26 PROCEDURE — 3079F DIAST BP 80-89 MM HG: CPT | Mod: CPTII,S$GLB,, | Performed by: INTERNAL MEDICINE

## 2021-05-26 PROCEDURE — 3077F PR MOST RECENT SYSTOLIC BLOOD PRESSURE >= 140 MM HG: ICD-10-PCS | Mod: CPTII,S$GLB,, | Performed by: INTERNAL MEDICINE

## 2021-05-26 PROCEDURE — 99214 OFFICE O/P EST MOD 30 MIN: CPT | Mod: S$GLB,,, | Performed by: INTERNAL MEDICINE

## 2021-05-26 PROCEDURE — 3077F SYST BP >= 140 MM HG: CPT | Mod: CPTII,S$GLB,, | Performed by: INTERNAL MEDICINE

## 2021-05-26 PROCEDURE — 1126F PR PAIN SEVERITY QUANTIFIED, NO PAIN PRESENT: ICD-10-PCS | Mod: S$GLB,,, | Performed by: INTERNAL MEDICINE

## 2021-05-26 PROCEDURE — 3008F BODY MASS INDEX DOCD: CPT | Mod: CPTII,S$GLB,, | Performed by: INTERNAL MEDICINE

## 2021-05-26 PROCEDURE — 99999 PR PBB SHADOW E&M-EST. PATIENT-LVL III: CPT | Mod: PBBFAC,,, | Performed by: INTERNAL MEDICINE

## 2021-05-26 PROCEDURE — 99999 PR PBB SHADOW E&M-EST. PATIENT-LVL III: ICD-10-PCS | Mod: PBBFAC,,, | Performed by: INTERNAL MEDICINE

## 2021-05-26 PROCEDURE — 3008F PR BODY MASS INDEX (BMI) DOCUMENTED: ICD-10-PCS | Mod: CPTII,S$GLB,, | Performed by: INTERNAL MEDICINE

## 2021-05-26 PROCEDURE — 99214 PR OFFICE/OUTPT VISIT, EST, LEVL IV, 30-39 MIN: ICD-10-PCS | Mod: S$GLB,,, | Performed by: INTERNAL MEDICINE

## 2021-05-26 PROCEDURE — 3079F PR MOST RECENT DIASTOLIC BLOOD PRESSURE 80-89 MM HG: ICD-10-PCS | Mod: CPTII,S$GLB,, | Performed by: INTERNAL MEDICINE

## 2021-05-26 PROCEDURE — 1126F AMNT PAIN NOTED NONE PRSNT: CPT | Mod: S$GLB,,, | Performed by: INTERNAL MEDICINE

## 2021-08-06 ENCOUNTER — PATIENT MESSAGE (OUTPATIENT)
Dept: INTERNAL MEDICINE | Facility: CLINIC | Age: 61
End: 2021-08-06

## 2021-12-08 ENCOUNTER — PATIENT MESSAGE (OUTPATIENT)
Dept: SLEEP MEDICINE | Facility: CLINIC | Age: 61
End: 2021-12-08
Payer: COMMERCIAL

## 2021-12-09 DIAGNOSIS — G47.33 OBSTRUCTIVE SLEEP APNEA: Primary | ICD-10-CM

## 2021-12-10 ENCOUNTER — PATIENT MESSAGE (OUTPATIENT)
Dept: SLEEP MEDICINE | Facility: CLINIC | Age: 61
End: 2021-12-10
Payer: COMMERCIAL

## 2021-12-13 ENCOUNTER — TELEPHONE (OUTPATIENT)
Dept: SLEEP MEDICINE | Facility: OTHER | Age: 61
End: 2021-12-13
Payer: COMMERCIAL

## 2022-01-18 ENCOUNTER — TELEPHONE (OUTPATIENT)
Dept: SLEEP MEDICINE | Facility: OTHER | Age: 62
End: 2022-01-18
Payer: COMMERCIAL

## 2022-01-24 ENCOUNTER — TELEPHONE (OUTPATIENT)
Dept: SLEEP MEDICINE | Facility: OTHER | Age: 62
End: 2022-01-24
Payer: COMMERCIAL

## 2022-01-27 ENCOUNTER — TELEPHONE (OUTPATIENT)
Dept: SLEEP MEDICINE | Facility: OTHER | Age: 62
End: 2022-01-27
Payer: COMMERCIAL

## 2022-02-02 ENCOUNTER — TELEPHONE (OUTPATIENT)
Dept: SLEEP MEDICINE | Facility: OTHER | Age: 62
End: 2022-02-02
Payer: COMMERCIAL

## 2022-02-02 NOTE — TELEPHONE ENCOUNTER
Patient canceled home sleep study,left messages to reschedule and talked to patient,no response.  Sent out a message through [facility] to reschedule.

## 2022-02-21 ENCOUNTER — TELEPHONE (OUTPATIENT)
Dept: SLEEP MEDICINE | Facility: OTHER | Age: 62
End: 2022-02-21
Payer: COMMERCIAL

## 2022-02-21 NOTE — TELEPHONE ENCOUNTER
Patient canceled home sleep study in Jan,we left messages to reschedule,talked to patient and sent out a message through [employer].  No response.

## 2022-05-31 ENCOUNTER — PATIENT MESSAGE (OUTPATIENT)
Dept: ADMINISTRATIVE | Facility: HOSPITAL | Age: 62
End: 2022-05-31
Payer: COMMERCIAL

## 2022-08-15 ENCOUNTER — TELEPHONE (OUTPATIENT)
Dept: CARDIOLOGY | Facility: CLINIC | Age: 62
End: 2022-08-15
Payer: COMMERCIAL

## 2022-08-15 ENCOUNTER — OFFICE VISIT (OUTPATIENT)
Dept: CARDIOLOGY | Facility: CLINIC | Age: 62
End: 2022-08-15
Payer: COMMERCIAL

## 2022-08-15 VITALS
HEIGHT: 67 IN | SYSTOLIC BLOOD PRESSURE: 148 MMHG | WEIGHT: 212.5 LBS | BODY MASS INDEX: 33.35 KG/M2 | HEART RATE: 94 BPM | DIASTOLIC BLOOD PRESSURE: 96 MMHG | OXYGEN SATURATION: 95 %

## 2022-08-15 DIAGNOSIS — E78.2 MIXED HYPERLIPIDEMIA: ICD-10-CM

## 2022-08-15 DIAGNOSIS — I42.9 CARDIOMYOPATHY, UNSPECIFIED TYPE: Primary | ICD-10-CM

## 2022-08-15 DIAGNOSIS — I50.42 CHRONIC COMBINED SYSTOLIC AND DIASTOLIC CONGESTIVE HEART FAILURE: ICD-10-CM

## 2022-08-15 DIAGNOSIS — R94.31 NONSPECIFIC ABNORMAL ELECTROCARDIOGRAM (ECG) (EKG): ICD-10-CM

## 2022-08-15 DIAGNOSIS — I34.0 MITRAL VALVE INSUFFICIENCY, UNSPECIFIED ETIOLOGY: ICD-10-CM

## 2022-08-15 DIAGNOSIS — G47.33 OSA (OBSTRUCTIVE SLEEP APNEA): ICD-10-CM

## 2022-08-15 PROCEDURE — 99205 OFFICE O/P NEW HI 60 MIN: CPT | Mod: 25,S$GLB,, | Performed by: INTERNAL MEDICINE

## 2022-08-15 PROCEDURE — 99999 PR PBB SHADOW E&M-EST. PATIENT-LVL IV: ICD-10-PCS | Mod: PBBFAC,,, | Performed by: INTERNAL MEDICINE

## 2022-08-15 PROCEDURE — 3077F PR MOST RECENT SYSTOLIC BLOOD PRESSURE >= 140 MM HG: ICD-10-PCS | Mod: CPTII,S$GLB,, | Performed by: INTERNAL MEDICINE

## 2022-08-15 PROCEDURE — 3080F PR MOST RECENT DIASTOLIC BLOOD PRESSURE >= 90 MM HG: ICD-10-PCS | Mod: CPTII,S$GLB,, | Performed by: INTERNAL MEDICINE

## 2022-08-15 PROCEDURE — 93000 EKG 12-LEAD: ICD-10-PCS | Mod: S$GLB,,, | Performed by: INTERNAL MEDICINE

## 2022-08-15 PROCEDURE — 93000 ELECTROCARDIOGRAM COMPLETE: CPT | Mod: S$GLB,,, | Performed by: INTERNAL MEDICINE

## 2022-08-15 PROCEDURE — 1159F PR MEDICATION LIST DOCUMENTED IN MEDICAL RECORD: ICD-10-PCS | Mod: CPTII,S$GLB,, | Performed by: INTERNAL MEDICINE

## 2022-08-15 PROCEDURE — 99999 PR PBB SHADOW E&M-EST. PATIENT-LVL IV: CPT | Mod: PBBFAC,,, | Performed by: INTERNAL MEDICINE

## 2022-08-15 PROCEDURE — 1160F RVW MEDS BY RX/DR IN RCRD: CPT | Mod: CPTII,S$GLB,, | Performed by: INTERNAL MEDICINE

## 2022-08-15 PROCEDURE — 3008F PR BODY MASS INDEX (BMI) DOCUMENTED: ICD-10-PCS | Mod: CPTII,S$GLB,, | Performed by: INTERNAL MEDICINE

## 2022-08-15 PROCEDURE — 1159F MED LIST DOCD IN RCRD: CPT | Mod: CPTII,S$GLB,, | Performed by: INTERNAL MEDICINE

## 2022-08-15 PROCEDURE — 99205 PR OFFICE/OUTPT VISIT, NEW, LEVL V, 60-74 MIN: ICD-10-PCS | Mod: 25,S$GLB,, | Performed by: INTERNAL MEDICINE

## 2022-08-15 PROCEDURE — 1160F PR REVIEW ALL MEDS BY PRESCRIBER/CLIN PHARMACIST DOCUMENTED: ICD-10-PCS | Mod: CPTII,S$GLB,, | Performed by: INTERNAL MEDICINE

## 2022-08-15 PROCEDURE — 3080F DIAST BP >= 90 MM HG: CPT | Mod: CPTII,S$GLB,, | Performed by: INTERNAL MEDICINE

## 2022-08-15 PROCEDURE — 3008F BODY MASS INDEX DOCD: CPT | Mod: CPTII,S$GLB,, | Performed by: INTERNAL MEDICINE

## 2022-08-15 PROCEDURE — 3077F SYST BP >= 140 MM HG: CPT | Mod: CPTII,S$GLB,, | Performed by: INTERNAL MEDICINE

## 2022-08-15 RX ORDER — VALSARTAN 40 MG/1
40 TABLET ORAL DAILY
Qty: 30 TABLET | Refills: 11 | Status: SHIPPED | OUTPATIENT
Start: 2022-08-15 | End: 2022-08-29 | Stop reason: ALTCHOICE

## 2022-08-15 RX ORDER — CARVEDILOL 3.12 MG/1
3.12 TABLET ORAL 2 TIMES DAILY WITH MEALS
Qty: 60 TABLET | Refills: 11 | Status: SHIPPED | OUTPATIENT
Start: 2022-08-15 | End: 2022-08-29 | Stop reason: DRUGHIGH

## 2022-08-15 RX ORDER — FUROSEMIDE 20 MG/1
20 TABLET ORAL DAILY
Qty: 30 TABLET | Refills: 11 | Status: SHIPPED | OUTPATIENT
Start: 2022-08-15 | End: 2023-07-06

## 2022-08-15 NOTE — TELEPHONE ENCOUNTER
Spoke with patient.  He last saw Dr Mcdaniel in 2017.  He is having shortness of breath.  Advised patient to go to the ER.  Patient refused.  Appointment scheduled at the [facility] for today.  Advised patient to go to the ER if symptoms worsen.  Patient verbalized understanding.      ----- Message from Char Clancy sent at 8/15/2022  9:14 AM CDT -----      Name of Who is Calling: BELLA AVERY [[id number]]      What is the request in detail: Pt called said he's having trouble breathing and would like to be seen or speak to someone.Please contact to further discuss and advise.          Can the clinic reply by [facility]: N      What Number to Call Back if not in [facility]: [phone]

## 2022-08-15 NOTE — PROGRESS NOTES
"  Subjective:      Patient ID: Fan Chris is a 62 y.o. male.    Chief Complaint: Shortness of Breath    HPI:  Pt has a hx of a viral cardiomyopathy and a leaky valve which resolved.    Over the past week pt reports recurrent symptoms of waking up at night short of breath.    Pt is very active.  Boxes.    Pt is able to box for 45 minutes and feels very mild shortness of breath with exertion.    Review of Systems   Cardiovascular: Positive for orthopnea. Negative for chest pain, claudication, dyspnea on exertion, irregular heartbeat, leg swelling, near-syncope, palpitations and syncope.      "My blood pressure is always high"    Pt used to take blood pressure meds but "didn't like the way they made me feel -- dizzy and lightheaded".  Pt used to take losartan.    Pt used to take atorvastatin but decided to try dieting.    Pt is retired [employer].    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        Past Medical History:   Diagnosis Date    Asthma     Bronchitis     Cardiomyopathy     CHF (congestive heart failure)     Hyperlipidemia 10/14/2016    Hypertension     Sinusitis     Sleep apnea         Past Surgical History:   Procedure Laterality Date    arm fracture      EYE SURGERY      TONSILLECTOMY         Family History   Problem Relation Age of Onset    Lung cancer Mother     Cancer Mother 61        lung    Hypertension Father     Hyperlipidemia Father     Heart disease Paternal Grandmother        Social History     Socioeconomic History    Marital status: Single   Tobacco Use    Smoking status: Former Smoker     Packs/day: 1.00     Years: 8.00     Pack years: 8.00     Types: Cigarettes     Quit date: 10/4/1984     Years since quittin.8    Smokeless tobacco: Never Used   Substance and Sexual Activity    Alcohol use: Not Currently     Alcohol/week: 1.0 standard drink     Types: 1 Cans of beer per week    Drug use: No    Sexual activity: Never     Partners: Female   Social History Narrative " "   Lives alone with his dog. Generally eats outside and indulges in sweets and eats large portions of food. Drinks juice and sweet tea. Drinks 4-5 glasses of water.        Current Outpatient Medications on File Prior to Visit   Medication Sig Dispense Refill    aspirin (ECOTRIN) 81 MG EC tablet Take 81 mg by mouth once daily.      cetirizine (ZYRTEC) 10 MG tablet Take 10 mg by mouth once daily.      fluticasone propionate (FLONASE) 50 mcg/actuation nasal spray 2 sprays by Each Nostril route once daily.      omega-3 fatty acids (FISH OIL CONCENTRATE ORAL) Take by mouth.      TESTOSTERONE CYPIONATE IM Inject into the muscle. Twice weekly      [DISCONTINUED] atorvastatin (LIPITOR) 40 MG tablet Take 1 tablet (40 mg total) by mouth once daily. (Patient not taking: No sig reported) 90 tablet 3    [DISCONTINUED] BEE POLLEN ORAL Take by mouth.      [DISCONTINUED] zinc gluconate 50 mg tablet Take 50 mg by mouth once daily.       No current facility-administered medications on file prior to visit.       Review of patient's allergies indicates:  No Known Allergies  Objective:     Vitals:    08/15/22 1133   BP: (!) 148/96   BP Location: Left arm   Patient Position: Sitting   BP Method: Large (Automatic)   Pulse: 94   SpO2: 95%   Weight: 96.4 kg (212 lb 8.4 oz)   Height: 5' 7" (1.702 m)        Physical Exam  Vitals reviewed.   Constitutional:       General: He is not in acute distress.     Appearance: He is well-developed. He is not diaphoretic.   Eyes:      General: No scleral icterus.  Neck:      Vascular: No carotid bruit or JVD.   Cardiovascular:      Rate and Rhythm: Regular rhythm.      Pulses:           Posterior tibial pulses are 2+ on the right side and 2+ on the left side.      Heart sounds: Normal heart sounds. No murmur heard.    No friction rub. No gallop.   Pulmonary:      Effort: Pulmonary effort is normal. No respiratory distress.      Breath sounds: Rales (few crackles in bases\ ) present.   Abdominal: "      General: There is no abdominal bruit.      Palpations: Abdomen is soft. There is no shifting dullness, fluid wave, hepatomegaly, splenomegaly, mass or pulsatile mass.      Tenderness: There is no abdominal tenderness.   Musculoskeletal:      Right lower leg: Edema (trivial bilateral edema) present.      Left lower leg: Edema present.   Skin:     General: Skin is warm and dry.   Neurological:      Mental Status: He is alert and oriented to person, place, and time.   Psychiatric:         Behavior: Behavior normal.         Thought Content: Thought content normal.         Judgment: Judgment normal.              ECG today: NSR, LVH, left atrial disease, inverted T waves in inferior and lateral leads, possible septal scar, similar to last tracing 2017    No visits with results within 6 Month(s) from this visit.   Latest known visit with results is:   Lab Visit on 05/21/2021   Component Date Value Ref Range Status    Cholesterol 05/21/2021 199  120 - 199 mg/dL Final    Triglycerides 05/21/2021 144  30 - 150 mg/dL Final    HDL 05/21/2021 43  40 - 75 mg/dL Final    LDL Cholesterol 05/21/2021 127.2  63.0 - 159.0 mg/dL Final    HDL/Cholesterol Ratio 05/21/2021 21.6  20.0 - 50.0 % Final    Total Cholesterol/HDL Ratio 05/21/2021 4.6  2.0 - 5.0 Final    Non-HDL Cholesterol 05/21/2021 156  mg/dL Final   (     Result Notes     1 Patient Communication    Component Ref Range & Units 1 yr ago   (8/22/20) 2 yr ago   (5/25/20) 2 yr ago   (2/18/20) 2 yr ago   (1/21/20) 3 yr ago   (11/13/18) 5 yr ago   (10/24/16) 5 yr ago   (10/4/16)   Sodium 136 - 145 mmol/L 138  138  141  139  141  139 R  139 R    Potassium 3.5 - 5.1 mmol/L 3.3 Low   4.0  3.7  4.3  3.8  4.5 R  3.9 R    Chloride 95 - 110 mmol/L 100  102  103  98  101  102 R  96 Low  R    CO2 23 - 29 mmol/L 29  26  28  25  28  27 R  30 R    Glucose 70 - 110 mg/dL 99  103  98  100  113 High   91 R, CM  83 R, CM    BUN 6 - 20 mg/dL 23 High   18  16  19  18  28 High  R  18 R     Creatinine 0.5 - 1.4 mg/dL 1.5 High   1.2  1.2  1.4  1.1  1.21 R, CM  1.70 High  R, CM    Calcium 8.7 - 10.5 mg/dL 9.1  9.2  9.6  10.0  9.4  9.7 R  9.7 R    Total Protein 6.0 - 8.4 g/dL 7.3  7.3   7.9  7.2  7.0 R  6.9 R    Albumin 3.5 - 5.2 g/dL 4.2  4.2   4.5  3.7  4.5 R  4.8 R    Total Bilirubin 0.1 - 1.0 mg/dL 0.9  0.5 CM   0.9 CM  0.8 CM  1.4 High  R  2.3 High  R    Comment: For infants and newborns, interpretation of results should be based   on gestational age, weight and in agreement with clinical   observations.   Premature Infant recommended reference ranges:   Up to 24 hours.............<8.0 mg/dL   Up to 48 hours............<12.0 mg/dL   3-5 days..................<15.0 mg/dL   6-29 days.................<15.0 mg/dL    Alkaline Phosphatase 55 - 135 U/L 62  67   79  75  57 R  56 R    AST 10 - 40 U/L 29  25   24  25  21 R  24 R    ALT 10 - 44 U/L 35  28   30  28  28 R  28 R    Anion Gap 8 - 16 mmol/L 9  10  10  16  12      eGFR if African American >60 mL/min/1.73 m^2 58.5 Abnormal   >60.0  >60.0  >60.0  >60.0  78 R  52 Low  R    eGFR if non African American >60 mL/min/1.73 m^2 50.6 Abnormal   >60.0 CM  >60.0 CM  55.0 Abnormal  CM  >60.0 CM  67 R  45 Low  R    Comment: Calculation used to obtain the estimated glomerular filtration   rate (eGFR) is the CKD-EPI equation.    Resulting Agency  [facility]              Specimen Collected: 08/22/20 08:32 Last Resulted: 08/22/20            (2/22/21)1 yr ago   (8/22/20)2 yr ago   (2/27/20)2 yr ago   (1/21/20)5 yr ago   (10/4/16)12 yr ago   (10/2/09) WBC3.90 - 12.70 K/uL5.31 5.24 5.25 6.08 8.0 R 4.46 Low  R RBC4.60 - 6.20 M/uL5.15 5.26 4.90 5.85 6.07 High  R 5.22 Ywhepmqtvi11.0 - 18.0 g/dL15.4 16.0 14.3 17.0 18.0 High  R 15.5 R Epikkmbcvl93.0 - 54.0 %48.7 48.4 43.2 52.5 56.5 High  R 45.5 MCV82 - 98 fL95 92 88 90 93.0 R 87.2 R MCH27.0 - 31.0 pg29.9 30.4 29.2 29.1 29.6 R 29.7 R MCHC32.0 - 36.0 g/dL31.6 Low  33.1 33.1 32.4 31.8 Low  34.1 R  RDW11.5 - 14.5 %15.4 High  14.3 14.0 13.5 14.9 R 13.1 Yfgpauewr834 - 350 K/uL394 High  372 High  414 High  447 High  425 High  R 445 High  MPV9.2 - 12.9 fL8.8 Low  8.9 Low  8.4 Low  8.8 Low  7.4 Low  R 9.1 Low  Immature Granulocytes0.0 - 0.5 %0.6 High  0.4 0.6 High  0.5 Gran # (ANC)1.8 - 7.7 K/uL2.9 2.9 2.9 3.8 2.3 Immature Grans (Abs)0.00 - 0.04 K/uL0.03 0.02 CM 0.03 CM 0.03 CM Comment: Mild elevation in immature granulocytes is non specific and   can be seen in a variety of conditions including stress response,   acute inflammation, trauma and pregnancy           Reading Physician Reading Date Result Priority   Esequiel Roldan MD  [phone] 10/7/2016      Narrative & Impression  Time of Procedure: 10/07/16 07:11:23  Accession # [id number]     LEXISCAN MIBI/TETROFOSMIN MYOCARDIAL PERFUSION SCAN     Indication:  Cardiomyopathy     Technique: SPECT images were acquired after the injection of 10 mCi of Tc-99m sestamibi at rest and 25 mCi during a Lexiscan injection. Heart rate changed from 102 to 134 bpm, and blood pressure changed from 97/65 to 147/73 mm/Hg.    During the stress procedure, shortness of breath was reported.  ECG portion of the stress is to be read seperately.     Findings:    The quality of the study is good VS is compromised by patient motion/GI activity adjacent to the inferior wall.      There is dilatation of the LV cavity (with a TID ratio of 1.1. Poor contractility seen at both rest and stress.  The gated post-stress images reveal impaired wall motion and diminished systolic wall thickening with an estimated LVEF of 16 %. The LV cavity is dilated with an end-diastolic volume of 336 ml and an end-systolic volume of 267 ml.  IMPRESSION:         *  Scintigraphically negative for ischemia and infarct.     *  The global left ventricular systolic function is decreased with an LV ejection fraction of 16 % and left ventricular dilatation. Wall motion is diminished.        Electronically  signed by: ESEQUIEL BESS MD  Date:                                            10/07/16  Time:                                           16:42              Exam Ended: 10/07/16 10:03 Last Resulted           And Lateral    Status: Final result         MyChart Results Release    Digital Folio Status: Active  Results Release           PACS Images for [employer] Viewer     Show images for X-Ray Chest PA And Lateral                All Reviewers List    Chiki Mcdaniel MD on 10/13/2016 10:14       X-Ray Chest PA And Lateral  Order: [id number]   Status: Final result     Visible to patient: Yes (not seen)     Next appt: None     Dx: Chronic combined systolic and diastol...     0 Result Notes    Details    Reading Physician Reading Date Result Priority   Angela Bergeron MD  [phone]  [phone] 10/7/2016      Narrative & Impression  2 views obtained.  No comparison.  The cardiac size is moderately enlarged.  There is no pleural effusion.  The osseous structures are intact.  There is no focal consolidation or significant increase in interstitial markings.  IMPRESSION:    Moderate cardiomegaly        Electronically signed by: Angela Bergeron MD  Date:                                            10/07/16  Time:                                           09:42        Reading Physician Reading Date Result Priority   Esequiel Bess MD  [phone]  [phone] 10/7/2016      Narrative & Impression  Time of Procedure: 10/07/16 07:11:23  Accession # [id number]     LEXISCAN MIBI/TETROFOSMIN MYOCARDIAL PERFUSION SCAN     Indication:  Cardiomyopathy     Technique: SPECT images were acquired after the injection of 10 mCi of Tc-99m sestamibi at rest and 25 mCi during a Lexiscan injection. Heart rate changed from 102 to 134 bpm, and blood pressure changed from 97/65 to 147/73 mm/Hg.    During the stress procedure, shortness of breath was reported.  ECG portion of the stress is to be read seperately.     Findings:    The quality  of the study is good VS is compromised by patient motion/GI activity adjacent to the inferior wall.      There is dilatation of the LV cavity (with a TID ratio of 1.1. Poor contractility seen at both rest and stress.  The gated post-stress images reveal impaired wall motion and diminished systolic wall thickening with an estimated LVEF of 16 %. The LV cavity is dilated with an end-diastolic volume of 336 ml and an end-systolic volume of 267 ml.  IMPRESSION:         *  Scintigraphically negative for ischemia and infarct.     *  The global left ventricular systolic function is decreased with an LV ejection fraction of 16 % and left ventricular dilatation. Wall motion is diminished.        Electronically signed by: MARTHA BESS MD  Date:                                            10/07/16  Time:                                           16:42              Exam Ended: 10/07/16 10:03 Last Resulted           And Lateral    Status: Final result         MyChart Results Release    [employer] Status: Active  Results Release           PACS Images for [employer] Viewer     Show images for X-Ray Chest PA And Lateral                All Reviewers List    Chiki Mcdaniel MD on 10/13/2016 10:14       X-Ray Chest PA And Lateral  Order: [id number]   Status: Final result     Visible to patient: Yes (not seen)     Next appt: None     Dx: Chronic combined systolic and diastol...     0 Result Notes    Details    Reading Physician Reading Date Result Priority   Angela Bergeron MD  [phone] 10/7/2016      Narrative & Impression  2 views obtained.  No comparison.  The cardiac size is moderately enlarged.  There is no pleural effusion.  The osseous structures are intact.  There is no focal consolidation or significant increase in interstitial markings.  IMPRESSION:    Moderate cardiomegaly        Electronically signed by: Angela Bergeron MD  Date:                                            10/07/16  Time:                                            09:42        Assessment:     1. Cardiomyopathy, unspecified type    2. Chronic combined systolic and diastolic congestive heart failure    3. Mixed hyperlipidemia    4. Mitral valve insufficiency, unspecified etiology    5. Nonspecific abnormal electrocardiogram (ECG) (EKG)    6. JACLYN (obstructive sleep apnea)      Plan:   Fan was seen today for shortness of breath.    Diagnoses and all orders for this visit:    Cardiomyopathy, unspecified type  -     IN OFFICE EKG 12-LEAD (to Muse)  -     CBC Auto Differential; Future  -     Comprehensive Metabolic Panel; Future  -     Lipid Panel; Future  -     TSH; Future  -     BNP; Future  -     X-Ray Chest PA And Lateral; Future  -     Echo Saline Bubble? No; Future  -     NM Myocardial Perfusion Spect Multi Exer; Future  -     Nuclear Stress Test; Future    Chronic combined systolic and diastolic congestive heart failure  -     IN OFFICE EKG 12-LEAD (to Muse)  -     CBC Auto Differential; Future  -     Comprehensive Metabolic Panel; Future  -     Lipid Panel; Future  -     TSH; Future  -     BNP; Future  -     X-Ray Chest PA And Lateral; Future  -     Echo Saline Bubble? No; Future  -     NM Myocardial Perfusion Spect Multi Exer; Future  -     Nuclear Stress Test; Future    Mixed hyperlipidemia  -     IN OFFICE EKG 12-LEAD (to Muse)  -     CBC Auto Differential; Future  -     Comprehensive Metabolic Panel; Future  -     Lipid Panel; Future  -     TSH; Future  -     BNP; Future  -     X-Ray Chest PA And Lateral; Future  -     Echo Saline Bubble? No; Future  -     NM Myocardial Perfusion Spect Multi Exer; Future  -     Nuclear Stress Test; Future    Mitral valve insufficiency, unspecified etiology  -     IN OFFICE EKG 12-LEAD (to Muse)  -     CBC Auto Differential; Future  -     Comprehensive Metabolic Panel; Future  -     Lipid Panel; Future  -     TSH; Future  -     BNP; Future  -     X-Ray Chest PA And Lateral; Future  -     Echo Saline  Bubble? No; Future  -     NM Myocardial Perfusion Spect Multi Exer; Future  -     Nuclear Stress Test; Future    Nonspecific abnormal electrocardiogram (ECG) (EKG)  -     IN OFFICE EKG 12-LEAD (to Muse)  -     CBC Auto Differential; Future  -     Comprehensive Metabolic Panel; Future  -     Lipid Panel; Future  -     TSH; Future  -     BNP; Future  -     X-Ray Chest PA And Lateral; Future  -     Echo Saline Bubble? No; Future  -     NM Myocardial Perfusion Spect Multi Exer; Future  -     Nuclear Stress Test; Future    JACLYN (obstructive sleep apnea)  -     IN OFFICE EKG 12-LEAD (to Muse)  -     CBC Auto Differential; Future  -     Comprehensive Metabolic Panel; Future  -     Lipid Panel; Future  -     TSH; Future  -     BNP; Future  -     X-Ray Chest PA And Lateral; Future  -     Echo Saline Bubble? No; Future  -     NM Myocardial Perfusion Spect Multi Exer; Future  -     Nuclear Stress Test; Future    Other orders  -     carvediloL (COREG) 3.125 MG tablet; Take 1 tablet (3.125 mg total) by mouth 2 (two) times daily with meals.  -     valsartan (DIOVAN) 40 MG tablet; Take 1 tablet (40 mg total) by mouth once daily.  -     furosemide (LASIX) 20 MG tablet; Take 1 tablet (20 mg total) by mouth once daily. As needed for shortness of breath    Will resume ARB and beta blocker and repeat the CXR and echocardiogram and treadmill cardiolite stress test and labs    Hold carvedilol the morning of the stress test.     Restrict fluids and salt    Furosemide 20 mg daily prn orthopnea and PND    Will probably need to resume atorvastatin    Follow up in about 2 weeks (around 8/29/2022).

## 2022-08-22 ENCOUNTER — HOSPITAL ENCOUNTER (OUTPATIENT)
Dept: CARDIOLOGY | Facility: HOSPITAL | Age: 62
Discharge: HOME | End: 2022-08-22
Attending: INTERNAL MEDICINE
Payer: COMMERCIAL

## 2022-08-22 ENCOUNTER — PATIENT MESSAGE (OUTPATIENT)
Dept: INTERNAL MEDICINE | Facility: CLINIC | Age: 62
End: 2022-08-22
Payer: COMMERCIAL

## 2022-08-22 ENCOUNTER — HOSPITAL ENCOUNTER (OUTPATIENT)
Dept: RADIOLOGY | Facility: HOSPITAL | Age: 62
Discharge: HOME | End: 2022-08-22
Attending: INTERNAL MEDICINE
Payer: COMMERCIAL

## 2022-08-22 VITALS — HEIGHT: 67 IN | WEIGHT: 212 LBS | BODY MASS INDEX: 33.27 KG/M2

## 2022-08-22 DIAGNOSIS — I42.9 CARDIOMYOPATHY, UNSPECIFIED TYPE: ICD-10-CM

## 2022-08-22 DIAGNOSIS — G47.33 OSA (OBSTRUCTIVE SLEEP APNEA): ICD-10-CM

## 2022-08-22 DIAGNOSIS — I50.42 CHRONIC COMBINED SYSTOLIC AND DIASTOLIC CONGESTIVE HEART FAILURE: ICD-10-CM

## 2022-08-22 DIAGNOSIS — E78.2 MIXED HYPERLIPIDEMIA: ICD-10-CM

## 2022-08-22 DIAGNOSIS — R94.31 NONSPECIFIC ABNORMAL ELECTROCARDIOGRAM (ECG) (EKG): ICD-10-CM

## 2022-08-22 DIAGNOSIS — I34.0 MITRAL VALVE INSUFFICIENCY, UNSPECIFIED ETIOLOGY: ICD-10-CM

## 2022-08-22 LAB
CV STRESS BASE HR: 64 BPM
DIASTOLIC BLOOD PRESSURE: 81 MMHG
OHS CV CPX 1 MINUTE RECOVERY HEART RATE: 133 BPM
OHS CV CPX 85 PERCENT MAX PREDICTED HEART RATE MALE: 136
OHS CV CPX ESTIMATED METS: 12
OHS CV CPX MAX PREDICTED HEART RATE: 160
OHS CV CPX PATIENT IS FEMALE: 0
OHS CV CPX PATIENT IS MALE: 1
OHS CV CPX PEAK DIASTOLIC BLOOD PRESSURE: 81 MMHG
OHS CV CPX PEAK HEAR RATE: 155 BPM
OHS CV CPX PEAK RATE PRESSURE PRODUCT: NORMAL
OHS CV CPX PEAK SYSTOLIC BLOOD PRESSURE: 153 MMHG
OHS CV CPX PERCENT MAX PREDICTED HEART RATE ACHIEVED: 97
OHS CV CPX RATE PRESSURE PRODUCT PRESENTING: 9792
STRESS ECHO POST EXERCISE DUR MIN: 10 MINUTES
SYSTOLIC BLOOD PRESSURE: 153 MMHG

## 2022-08-22 PROCEDURE — 78452 NM MYOCARDIAL PERFUSION SPECT MULTI STUDY: ICD-10-PCS | Mod: 26,,, | Performed by: RADIOLOGY

## 2022-08-22 PROCEDURE — 78452 HT MUSCLE IMAGE SPECT MULT: CPT | Mod: 26,,, | Performed by: RADIOLOGY

## 2022-08-22 PROCEDURE — 71046 X-RAY EXAM CHEST 2 VIEWS: CPT | Mod: TC,FY

## 2022-08-22 PROCEDURE — A9502 TC99M TETROFOSMIN: HCPCS

## 2022-08-22 PROCEDURE — 93017 CV STRESS TEST TRACING ONLY: CPT

## 2022-08-22 PROCEDURE — 93018 CV STRESS TEST I&R ONLY: CPT | Mod: ,,, | Performed by: INTERNAL MEDICINE

## 2022-08-22 PROCEDURE — 93306 ECHO (CUPID ONLY): ICD-10-PCS | Mod: 26,,, | Performed by: INTERNAL MEDICINE

## 2022-08-22 PROCEDURE — 71046 XR CHEST PA AND LATERAL: ICD-10-PCS | Mod: 26,,, | Performed by: RADIOLOGY

## 2022-08-22 PROCEDURE — 93018 NUCLEAR STRESS TEST (CUPID ONLY): ICD-10-PCS | Mod: ,,, | Performed by: INTERNAL MEDICINE

## 2022-08-22 PROCEDURE — 71046 X-RAY EXAM CHEST 2 VIEWS: CPT | Mod: 26,,, | Performed by: RADIOLOGY

## 2022-08-22 PROCEDURE — 93306 TTE W/DOPPLER COMPLETE: CPT | Mod: 26,,, | Performed by: INTERNAL MEDICINE

## 2022-08-22 PROCEDURE — 93016 NUCLEAR STRESS TEST (CUPID ONLY): ICD-10-PCS | Mod: ,,, | Performed by: INTERNAL MEDICINE

## 2022-08-22 PROCEDURE — 93016 CV STRESS TEST SUPVJ ONLY: CPT | Mod: ,,, | Performed by: INTERNAL MEDICINE

## 2022-08-22 PROCEDURE — 93306 TTE W/DOPPLER COMPLETE: CPT

## 2022-08-23 ENCOUNTER — TELEPHONE (OUTPATIENT)
Dept: CARDIOLOGY | Facility: CLINIC | Age: 62
End: 2022-08-23
Payer: COMMERCIAL

## 2022-08-23 ENCOUNTER — PATIENT MESSAGE (OUTPATIENT)
Dept: CARDIOLOGY | Facility: CLINIC | Age: 62
End: 2022-08-23
Payer: COMMERCIAL

## 2022-08-23 DIAGNOSIS — N18.30 STAGE 3 CHRONIC KIDNEY DISEASE, UNSPECIFIED WHETHER STAGE 3A OR 3B CKD: Primary | ICD-10-CM

## 2022-08-23 DIAGNOSIS — R91.1 SOLITARY PULMONARY NODULE: ICD-10-CM

## 2022-08-23 LAB
AORTIC ROOT ANNULUS: 4.38 CM
ASCENDING AORTA: 2.94 CM
AV INDEX (PROSTH): 0.55
AV MEAN GRADIENT: 3 MMHG
AV PEAK GRADIENT: 5 MMHG
AV VALVE AREA: 2.36 CM2
AV VELOCITY RATIO: 0.57
BSA FOR ECHO PROCEDURE: 2.13 M2
CV ECHO LV RWT: 0.46 CM
DOP CALC AO PEAK VEL: 1.15 M/S
DOP CALC AO VTI: 22.18 CM
DOP CALC LVOT AREA: 4.3 CM2
DOP CALC LVOT DIAMETER: 2.34 CM
DOP CALC LVOT PEAK VEL: 0.66 M/S
DOP CALC LVOT STROKE VOLUME: 52.27 CM3
DOP CALC MV VTI: 18.62 CM
DOP CALCLVOT PEAK VEL VTI: 12.16 CM
E WAVE DECELERATION TIME: 187.08 MSEC
E/A RATIO: 0.79
E/E' RATIO: 14.75 M/S
ECHO LV POSTERIOR WALL: 1.28 CM (ref 0.6–1.1)
EJECTION FRACTION: 30 %
FRACTIONAL SHORTENING: 14 % (ref 28–44)
INTERVENTRICULAR SEPTUM: 1.54 CM (ref 0.6–1.1)
LA MAJOR: 4.67 CM
LA MINOR: 5.03 CM
LA WIDTH: 4.1 CM
LEFT ATRIUM SIZE: 4.7 CM
LEFT ATRIUM VOLUME INDEX MOD: 26 ML/M2
LEFT ATRIUM VOLUME INDEX: 38.3 ML/M2
LEFT ATRIUM VOLUME MOD: 53.82 CM3
LEFT ATRIUM VOLUME: 79.33 CM3
LEFT INTERNAL DIMENSION IN SYSTOLE: 4.85 CM (ref 2.1–4)
LEFT VENTRICLE DIASTOLIC VOLUME INDEX: 74.87 ML/M2
LEFT VENTRICLE DIASTOLIC VOLUME: 154.99 ML
LEFT VENTRICLE MASS INDEX: 171 G/M2
LEFT VENTRICLE SYSTOLIC VOLUME INDEX: 53.3 ML/M2
LEFT VENTRICLE SYSTOLIC VOLUME: 110.27 ML
LEFT VENTRICULAR INTERNAL DIMENSION IN DIASTOLE: 5.62 CM (ref 3.5–6)
LEFT VENTRICULAR MASS: 353.12 G
LV LATERAL E/E' RATIO: 14.75 M/S
LV SEPTAL E/E' RATIO: 14.75 M/S
MV MEAN GRADIENT: 1 MMHG
MV PEAK A VEL: 0.75 M/S
MV PEAK E VEL: 0.59 M/S
MV PEAK GRADIENT: 2 MMHG
MV STENOSIS PRESSURE HALF TIME: 54.25 MS
MV VALVE AREA BY CONTINUITY EQUATION: 2.81 CM2
MV VALVE AREA P 1/2 METHOD: 4.06 CM2
PISA TR MAX VEL: 2.5 M/S
PV PEAK VELOCITY: 0.91 CM/S
RA MAJOR: 4.23 CM
RA PRESSURE: 3 MMHG
RA WIDTH: 3.98 CM
RIGHT VENTRICULAR END-DIASTOLIC DIMENSION: 2.8 CM
RV TISSUE DOPPLER FREE WALL SYSTOLIC VELOCITY 1 (APICAL 4 CHAMBER VIEW): 6.57 CM/S
STJ: 2.88 CM
TDI LATERAL: 0.04 M/S
TDI SEPTAL: 0.04 M/S
TDI: 0.04 M/S
TR MAX PG: 25 MMHG
TRICUSPID ANNULAR PLANE SYSTOLIC EXCURSION: 1.04 CM
TV REST PULMONARY ARTERY PRESSURE: 28 MMHG

## 2022-08-23 RX ORDER — ATORVASTATIN CALCIUM 40 MG/1
40 TABLET, FILM COATED ORAL DAILY
Qty: 90 TABLET | Refills: 3 | Status: SHIPPED | OUTPATIENT
Start: 2022-08-23 | End: 2024-01-09 | Stop reason: SDUPTHER

## 2022-08-23 NOTE — TELEPHONE ENCOUNTER
I spoke with pt.  OK to exercise but it is necessary to avoid head trauma while on Eliquis.  Go to ER for a CT of the brain prn any head trauma.  Rationale for Eliquis discussed--apical mural thrombus..  Need to avoid ASA and NSAID's discussed.  (Tylenol is OK0  DX of CKD discussed as well as my recommendation for U/S and nephrology consult.  Finding of lung nodule discussed and rationale for CT of the chest discussed.

## 2022-08-23 NOTE — TELEPHONE ENCOUNTER
Pt phoned.  No answer.  Message   CXR abnormal nodule.  Will get CT of chest  Cardiolite negative for infarct or ischemia with LVEF 29%.  Echocardiogram shows LVEF 30%, global and focal hypokinesis, apical mural thrombus.  Will begin Eliquis 5 mg bid.  Elevated LDL noted.  Will begin atrovastatin 40 mg daily.  CKD noted.  Will get ultrasound of kidneys and bladder and refer pt to nephrologist at the main campus. Pt will need to avoid NSAID's

## 2022-08-27 ENCOUNTER — HOSPITAL ENCOUNTER (OUTPATIENT)
Dept: RADIOLOGY | Facility: HOSPITAL | Age: 62
Discharge: HOME | End: 2022-08-27
Attending: INTERNAL MEDICINE
Payer: COMMERCIAL

## 2022-08-27 DIAGNOSIS — R91.1 SOLITARY PULMONARY NODULE: ICD-10-CM

## 2022-08-27 DIAGNOSIS — N18.30 STAGE 3 CHRONIC KIDNEY DISEASE, UNSPECIFIED WHETHER STAGE 3A OR 3B CKD: ICD-10-CM

## 2022-08-27 PROCEDURE — 76770 US EXAM ABDO BACK WALL COMP: CPT | Mod: TC

## 2022-08-27 PROCEDURE — 76770 US EXAM ABDO BACK WALL COMP: CPT | Mod: 26,,, | Performed by: RADIOLOGY

## 2022-08-27 PROCEDURE — 76770 US RETROPERITONEAL COMPLETE: ICD-10-PCS | Mod: 26,,, | Performed by: RADIOLOGY

## 2022-08-29 ENCOUNTER — OFFICE VISIT (OUTPATIENT)
Dept: CARDIOLOGY | Facility: CLINIC | Age: 62
End: 2022-08-29
Payer: COMMERCIAL

## 2022-08-29 VITALS
HEART RATE: 78 BPM | OXYGEN SATURATION: 99 % | SYSTOLIC BLOOD PRESSURE: 141 MMHG | WEIGHT: 209.44 LBS | HEIGHT: 67 IN | DIASTOLIC BLOOD PRESSURE: 86 MMHG | BODY MASS INDEX: 32.87 KG/M2

## 2022-08-29 DIAGNOSIS — I50.42 CHRONIC COMBINED SYSTOLIC AND DIASTOLIC CONGESTIVE HEART FAILURE: ICD-10-CM

## 2022-08-29 DIAGNOSIS — E78.2 MIXED HYPERLIPIDEMIA: ICD-10-CM

## 2022-08-29 DIAGNOSIS — I51.3 APICAL MURAL THROMBUS: ICD-10-CM

## 2022-08-29 DIAGNOSIS — I42.0 DILATED CARDIOMYOPATHY: Primary | ICD-10-CM

## 2022-08-29 DIAGNOSIS — I34.0 MITRAL VALVE INSUFFICIENCY, UNSPECIFIED ETIOLOGY: ICD-10-CM

## 2022-08-29 DIAGNOSIS — R94.31 NONSPECIFIC ABNORMAL ELECTROCARDIOGRAM (ECG) (EKG): ICD-10-CM

## 2022-08-29 PROCEDURE — 99999 PR PBB SHADOW E&M-EST. PATIENT-LVL III: ICD-10-PCS | Mod: PBBFAC,,, | Performed by: INTERNAL MEDICINE

## 2022-08-29 PROCEDURE — 1159F MED LIST DOCD IN RCRD: CPT | Mod: CPTII,S$GLB,, | Performed by: INTERNAL MEDICINE

## 2022-08-29 PROCEDURE — 99214 PR OFFICE/OUTPT VISIT, EST, LEVL IV, 30-39 MIN: ICD-10-PCS | Mod: S$GLB,,, | Performed by: INTERNAL MEDICINE

## 2022-08-29 PROCEDURE — 1160F RVW MEDS BY RX/DR IN RCRD: CPT | Mod: CPTII,S$GLB,, | Performed by: INTERNAL MEDICINE

## 2022-08-29 PROCEDURE — 1160F PR REVIEW ALL MEDS BY PRESCRIBER/CLIN PHARMACIST DOCUMENTED: ICD-10-PCS | Mod: CPTII,S$GLB,, | Performed by: INTERNAL MEDICINE

## 2022-08-29 PROCEDURE — 99214 OFFICE O/P EST MOD 30 MIN: CPT | Mod: S$GLB,,, | Performed by: INTERNAL MEDICINE

## 2022-08-29 PROCEDURE — 4010F PR ACE/ARB THEARPY RXD/TAKEN: ICD-10-PCS | Mod: CPTII,S$GLB,, | Performed by: INTERNAL MEDICINE

## 2022-08-29 PROCEDURE — 1159F PR MEDICATION LIST DOCUMENTED IN MEDICAL RECORD: ICD-10-PCS | Mod: CPTII,S$GLB,, | Performed by: INTERNAL MEDICINE

## 2022-08-29 PROCEDURE — 3008F PR BODY MASS INDEX (BMI) DOCUMENTED: ICD-10-PCS | Mod: CPTII,S$GLB,, | Performed by: INTERNAL MEDICINE

## 2022-08-29 PROCEDURE — 99999 PR PBB SHADOW E&M-EST. PATIENT-LVL III: CPT | Mod: PBBFAC,,, | Performed by: INTERNAL MEDICINE

## 2022-08-29 PROCEDURE — 3077F SYST BP >= 140 MM HG: CPT | Mod: CPTII,S$GLB,, | Performed by: INTERNAL MEDICINE

## 2022-08-29 PROCEDURE — 3077F PR MOST RECENT SYSTOLIC BLOOD PRESSURE >= 140 MM HG: ICD-10-PCS | Mod: CPTII,S$GLB,, | Performed by: INTERNAL MEDICINE

## 2022-08-29 PROCEDURE — 4010F ACE/ARB THERAPY RXD/TAKEN: CPT | Mod: CPTII,S$GLB,, | Performed by: INTERNAL MEDICINE

## 2022-08-29 PROCEDURE — 3079F DIAST BP 80-89 MM HG: CPT | Mod: CPTII,S$GLB,, | Performed by: INTERNAL MEDICINE

## 2022-08-29 PROCEDURE — 3079F PR MOST RECENT DIASTOLIC BLOOD PRESSURE 80-89 MM HG: ICD-10-PCS | Mod: CPTII,S$GLB,, | Performed by: INTERNAL MEDICINE

## 2022-08-29 PROCEDURE — 3008F BODY MASS INDEX DOCD: CPT | Mod: CPTII,S$GLB,, | Performed by: INTERNAL MEDICINE

## 2022-08-29 RX ORDER — CARVEDILOL 6.25 MG/1
6.25 TABLET ORAL EVERY 12 HOURS
Qty: 60 TABLET | Refills: 11 | Status: SHIPPED | OUTPATIENT
Start: 2022-08-29 | End: 2023-07-25

## 2022-08-29 RX ORDER — SACUBITRIL AND VALSARTAN 49; 51 MG/1; MG/1
1 TABLET, FILM COATED ORAL 2 TIMES DAILY
Qty: 60 TABLET | Refills: 11 | Status: SHIPPED | OUTPATIENT
Start: 2022-08-29 | End: 2022-11-28 | Stop reason: SDUPTHER

## 2022-08-29 NOTE — PROGRESS NOTES
Subjective:      Patient ID: Fan Chris is a 62 y.o. male.    Chief Complaint: Results    HPI:  Feels well.  No longer waking up in the middle of the night short of breath.    No longer orthopneic.    Feet are not swollen     Review of Systems   Cardiovascular:  Negative for chest pain, claudication, dyspnea on exertion, irregular heartbeat, leg swelling, near-syncope, orthopnea, palpitations and syncope.      Going on a cruise next week to [facility].    Past Medical History:   Diagnosis Date    Asthma     Bronchitis     Cardiomyopathy     CHF (congestive heart failure)     Hyperlipidemia 10/14/2016    Hypertension     Sinusitis     Sleep apnea         Past Surgical History:   Procedure Laterality Date    arm fracture      EYE SURGERY      TONSILLECTOMY         Family History   Problem Relation Age of Onset    Lung cancer Mother     Cancer Mother 61        lung    Hypertension Father     Hyperlipidemia Father     Heart disease Paternal Grandmother        Social History     Socioeconomic History    Marital status: Single   Tobacco Use    Smoking status: Former     Packs/day: 1.00     Years: 8.00     Pack years: 8.00     Types: Cigarettes     Quit date: 10/4/1984     Years since quittin.9    Smokeless tobacco: Never   Substance and Sexual Activity    Alcohol use: Not Currently     Alcohol/week: 1.0 standard drink     Types: 1 Cans of beer per week    Drug use: No    Sexual activity: Never     Partners: Female   Social History Narrative    Lives alone with his dog. Generally eats outside and indulges in sweets and eats large portions of food. Drinks juice and sweet tea. Drinks 4-5 glasses of water.      Social Determinants of Health     Financial Resource Strain: Low Risk     Difficulty of Paying Living Expenses: Not hard at all   Food Insecurity: No Food Insecurity    Worried About Running Out of Food in the Last Year: Never true    Ran Out of Food in the Last Year: Never true   Transportation Needs: No  Transportation Needs    Lack of Transportation (Medical): No    Lack of Transportation (Non-Medical): No   Physical Activity: Sufficiently Active    Days of Exercise per Week: 4 days    Minutes of Exercise per Session: 50 min   Stress: No Stress Concern Present    Feeling of Stress : Not at all   Social Connections: Unknown    Frequency of Communication with Friends and Family: More than three times a week    Frequency of Social Gatherings with Friends and Family: More than three times a week    Active Member of Clubs or Organizations: Yes    Attends Club or Organization Meetings: More than 4 times per year    Marital Status: Never    Housing Stability: Low Risk     Unable to Pay for Housing in the Last Year: No    Number of Places Lived in the Last Year: 2    Unstable Housing in the Last Year: No       Current Outpatient Medications on File Prior to Visit   Medication Sig Dispense Refill    apixaban (ELIQUIS) 5 mg Tab Take 1 tablet (5 mg total) by mouth 2 (two) times daily. 60 tablet 11    atorvastatin (LIPITOR) 40 MG tablet Take 1 tablet (40 mg total) by mouth once daily. 90 tablet 3    furosemide (LASIX) 20 MG tablet Take 1 tablet (20 mg total) by mouth once daily. As needed for shortness of breath 30 tablet 11    [DISCONTINUED] carvediloL (COREG) 3.125 MG tablet Take 1 tablet (3.125 mg total) by mouth 2 (two) times daily with meals. 60 tablet 11    [DISCONTINUED] valsartan (DIOVAN) 40 MG tablet Take 1 tablet (40 mg total) by mouth once daily. 30 tablet 11    cetirizine (ZYRTEC) 10 MG tablet Take 10 mg by mouth once daily.      fluticasone propionate (FLONASE) 50 mcg/actuation nasal spray 2 sprays by Each Nostril route once daily.      omega-3 fatty acids (FISH OIL CONCENTRATE ORAL) Take by mouth.      TESTOSTERONE CYPIONATE IM Inject into the muscle. Twice weekly      [DISCONTINUED] aspirin (ECOTRIN) 81 MG EC tablet Take 81 mg by mouth once daily.       No current facility-administered medications on file  "prior to visit.       Review of patient's allergies indicates:  No Known Allergies  Objective:     Vitals:    08/29/22 0908 08/29/22 0913   BP: (!) 157/99 (!) 141/86   BP Location: Left arm Right arm   Patient Position: Sitting Sitting   BP Method: Large (Automatic) Large (Automatic)   Pulse: 78    SpO2: 99%    Weight: 95 kg (209 lb 7 oz)    Height: 5' 7" (1.702 m)         Physical Exam  Vitals reviewed.   Constitutional:       General: He is not in acute distress.     Appearance: He is well-developed. He is not diaphoretic.   Eyes:      General: No scleral icterus.  Neck:      Vascular: No carotid bruit or JVD.   Cardiovascular:      Rate and Rhythm: Regular rhythm.      Heart sounds: Normal heart sounds. No murmur heard.    No friction rub. No gallop.   Pulmonary:      Effort: Pulmonary effort is normal. No respiratory distress.      Breath sounds: Normal breath sounds.   Musculoskeletal:      Right lower leg: No edema.      Left lower leg: No edema.   Skin:     General: Skin is warm and dry.   Neurological:      Mental Status: He is alert and oriented to person, place, and time.   Psychiatric:         Behavior: Behavior normal.         Thought Content: Thought content normal.         Judgment: Judgment normal.      WT down 3 lbs    Lab Visit on 08/22/2022   Component Date Value Ref Range Status    WBC 08/22/2022 7.51  3.90 - 12.70 K/uL Final    RBC 08/22/2022 6.28 (H)  4.60 - 6.20 M/uL Final    Hemoglobin 08/22/2022 16.9  14.0 - 18.0 g/dL Final    Hematocrit 08/22/2022 52.7  40.0 - 54.0 % Final    MCV 08/22/2022 84  82 - 98 fL Final    MCH 08/22/2022 26.9 (L)  27.0 - 31.0 pg Final    MCHC 08/22/2022 32.1  32.0 - 36.0 g/dL Final    RDW 08/22/2022 15.1 (H)  11.5 - 14.5 % Final    Platelets 08/22/2022 436  150 - 450 K/uL Final    MPV 08/22/2022 8.3 (L)  9.2 - 12.9 fL Final    Immature Granulocytes 08/22/2022 0.4  0.0 - 0.5 % Final    Gran # (ANC) 08/22/2022 4.9  1.8 - 7.7 K/uL Final    Immature Grans (Abs) " 08/22/2022 0.03  0.00 - 0.04 K/uL Final    Lymph # 08/22/2022 1.7  1.0 - 4.8 K/uL Final    Mono # 08/22/2022 0.6  0.3 - 1.0 K/uL Final    Eos # 08/22/2022 0.3  0.0 - 0.5 K/uL Final    Baso # 08/22/2022 0.07  0.00 - 0.20 K/uL Final    nRBC 08/22/2022 0  0 /100 WBC Final    Gran % 08/22/2022 65.0  38.0 - 73.0 % Final    Lymph % 08/22/2022 22.1  18.0 - 48.0 % Final    Mono % 08/22/2022 8.1  4.0 - 15.0 % Final    Eosinophil % 08/22/2022 3.5  0.0 - 8.0 % Final    Basophil % 08/22/2022 0.9  0.0 - 1.9 % Final    Differential Method 08/22/2022 Automated   Final    Sodium 08/22/2022 140  136 - 145 mmol/L Final    Potassium 08/22/2022 4.4  3.5 - 5.1 mmol/L Final    Chloride 08/22/2022 103  95 - 110 mmol/L Final    CO2 08/22/2022 24  23 - 29 mmol/L Final    Glucose 08/22/2022 102  70 - 110 mg/dL Final    BUN 08/22/2022 23 (H)  6 - 20 mg/dL Final    Creatinine 08/22/2022 1.7 (H)  0.5 - 1.4 mg/dL Final    Calcium 08/22/2022 9.4  8.7 - 10.5 mg/dL Final    Total Protein 08/22/2022 7.3  6.0 - 8.4 g/dL Final    Albumin 08/22/2022 4.2  3.5 - 5.2 g/dL Final    Total Bilirubin 08/22/2022 1.1 (H)  0.1 - 1.0 mg/dL Final    Alkaline Phosphatase 08/22/2022 67  55 - 135 U/L Final    AST 08/22/2022 17  10 - 40 U/L Final    ALT 08/22/2022 18  10 - 44 U/L Final    Anion Gap 08/22/2022 13  8 - 16 mmol/L Final    eGFR 08/22/2022 46 (A)  >60 mL/min/1.73 m^2 Final    Cholesterol 08/22/2022 317 (H)  120 - 199 mg/dL Final    Triglycerides 08/22/2022 262 (H)  30 - 150 mg/dL Final    HDL 08/22/2022 37 (L)  40 - 75 mg/dL Final    LDL Cholesterol 08/22/2022 227.6 (H)  63.0 - 159.0 mg/dL Final    HDL/Cholesterol Ratio 08/22/2022 11.7 (L)  20.0 - 50.0 % Final    Total Cholesterol/HDL Ratio 08/22/2022 8.6 (H)  2.0 - 5.0 Final    Non-HDL Cholesterol 08/22/2022 280  mg/dL Final    TSH 08/22/2022 1.433  0.400 - 4.000 uIU/mL Final    BNP 08/22/2022 81  0 - 99 pg/mL Final   Hospital Outpatient Visit on 08/22/2022   Component Date Value Ref Range Status    85%  Max Predicted HR 08/22/2022 136   Final    Max Predicted HR 08/22/2022 160   Final    [facility] CV CPX PATIENT IS MALE 08/22/2022 1.0   Final    [facility] CV CPX PATIENT IS FEMALE 08/22/2022 0.0   Final    HR at rest 08/22/2022 64  bpm Final    Systolic blood pressure 08/22/2022 153  mmHg Final    Diastolic blood pressure 08/22/2022 81  mmHg Final    RPP 08/22/2022 9,792   Final    Exercise duration (min) 08/22/2022 10  minutes Final    Peak HR 08/22/2022 155  bpm Final    Peak Systolic BP 08/22/2022 153  mmHg Final    Peak Diatolic BP 08/22/2022 81  mmHg Final    Peak RPP 08/22/2022 23,715   Final    Estimated METs 08/22/2022 12   Final    % Max HR Achieved 08/22/2022 97   Final    1 Minute Recovery HR 08/22/2022 133  bpm Final   Hospital Outpatient Visit on 08/22/2022   Component Date Value Ref Range Status    Ascending aorta 08/22/2022 2.94  cm Final    STJ 08/22/2022 2.88  cm Final    AV mean gradient 08/22/2022 3  mmHg Final    Ao peak teddy 08/22/2022 1.15  m/s Final    Ao VTI 08/22/2022 22.18  cm Final    IVS 08/22/2022 1.54 (A)  0.6 - 1.1 cm Final    LA size 08/22/2022 4.70  cm Final    Left Atrium Major Axis 08/22/2022 4.67  cm Final    Left Atrium Minor Axis 08/22/2022 5.03  cm Final    LVIDd 08/22/2022 5.62  3.5 - 6.0 cm Final    LVIDs 08/22/2022 4.85 (A)  2.1 - 4.0 cm Final    LVOT diameter 08/22/2022 2.34  cm Final    LVOT peak VTI 08/22/2022 12.16  cm Final    Posterior Wall 08/22/2022 1.28 (A)  0.6 - 1.1 cm Final    MV Peak A Teddy 08/22/2022 0.75  m/s Final    E wave deceleration time 08/22/2022 187.08  msec Final    MV Peak E Teddy 08/22/2022 0.59  m/s Final    RA Major Axis 08/22/2022 4.23  cm Final    RA Width 08/22/2022 3.98  cm Final    RVDD 08/22/2022 2.80  cm Final    TAPSE 08/22/2022 1.04  cm Final    TDI LATERAL 08/22/2022 0.04  m/s Final    TDI SEPTAL 08/22/2022 0.04  m/s Final    LA WIDTH 08/22/2022 4.10  cm Final    Ao root annulus 08/22/2022 4.38  cm Final    PV PEAK VELOCITY 08/22/2022 0.91  cm/s Final     MV stenosis pressure 1/2 time 08/22/2022 54.25  ms Final    LV Diastolic Volume 08/22/2022 154.99  mL Final    LV Systolic Volume 08/22/2022 110.27  mL Final    LVOT peak teddy 08/22/2022 0.66  m/s Final    LA volume (mod) 08/22/2022 53.82  cm3 Final    MV mean gradient 08/22/2022 1  mmHg Final    MV peak gradient 08/22/2022 2  mmHg Final    RV S' 08/22/2022 6.57  cm/s Final    MV VTI 08/22/2022 18.62  cm Final    LV LATERAL E/E' RATIO 08/22/2022 14.75  m/s Final    LV SEPTAL E/E' RATIO 08/22/2022 14.75  m/s Final    FS 08/22/2022 14  % Final    LA volume 08/22/2022 79.33  cm3 Final    LV mass 08/22/2022 353.12  g Final    Left Ventricle Relative Wall Thick* 08/22/2022 0.46  cm Final    AV valve area 08/22/2022 2.36  cm2 Final    AV Velocity Ratio 08/22/2022 0.57   Final    AV index (prosthetic) 08/22/2022 0.55   Final    MV valve area p 1/2 method 08/22/2022 4.06  cm2 Final    MV valve area by continuity eq 08/22/2022 2.81  cm2 Final    E/A ratio 08/22/2022 0.79   Final    Mean e' 08/22/2022 0.04  m/s Final    LVOT area 08/22/2022 4.3  cm2 Final    LVOT stroke volume 08/22/2022 52.27  cm3 Final    AV peak gradient 08/22/2022 5  mmHg Final    E/E' ratio 08/22/2022 14.75  m/s Final    BSA 08/22/2022 2.13  m2 Final    LV Systolic Volume Index 08/22/2022 53.3  mL/m2 Final    LV Diastolic Volume Index 08/22/2022 74.87  mL/m2 Final    LA Volume Index 08/22/2022 38.3  mL/m2 Final    LV Mass Index 08/22/2022 171  g/m2 Final    LA Volume Index (Mod) 08/22/2022 26.0  mL/m2 Final    Right Atrial Pressure (from IVC) 08/22/2022 3  mmHg Final    EF 08/22/2022 30  % Final    TV rest pulmonary artery pressure 08/22/2022 28  mmHg Final    TR Max Teddy 08/22/2022 2.50  m/s Final    Triscuspid Valve Regurgitation Pea* 08/22/2022 25  mmHg Final   (  Ordering physician: Chiki Mcdaniel MD Study date: 8/22/22     Reason for Exam  Priority: Routine  Dx: Cardiomyopathy, unspecified type [I42.9 (ICD-10-CM)]; Chronic combined systolic  and diastolic congestive heart failure [I50.42 (ICD-10-CM)]; Mixed hyperlipidemia [E78.2 (ICD-10-CM)]; Mitral valve insufficiency, unspecified etiology [I34.0 (ICD-10-CM)]; Nonspecific abnormal electrocardiogram (ECG) (EKG) [R94.31 (ICD-10-CM)]; JACLYN (obstructive sleep apnea) [G47.33 (ICD-10-CM)]     Result Image Hyperlink     Show images for Echo Saline Bubble? No  Summary    The left ventricle is moderately enlarged with mild concentric hypertrophy and severely decreased systolic function.  The estimated ejection fraction is 30%.  Grade I left ventricular diastolic dysfunction.  There is left ventricular global hypokinesis and focal mid inferior akinesis and focal anteroapical dyskinesis with an apparent small apical mural thrombus.  Mild right ventricular enlargement with low normal right ventricular systolic function.  Moderate left atrial enlargement.  Mild aortic regurgitation.  Mild-to-moderate mitral regurgitation.  Normal central venous pressure (3 mmHg).  The estimated PA systolic pressure is 28 mmHg.     ICD-10-CM)]; Nonspecific abnormal electrocardiogram (ECG) (EKG) [R94.31 (ICD-10-CM)]; JACLYN (obstructive sleep apnea) [G47.33 (ICD-10-CM)]     Conclusion         The EKG portion of this study is negative for ischemia.    The patient reported no chest pain during the stress test.    There were no arrhythmias during stress.    The nuclear portion of this study will be reported separately.     Performing Clinician     seen)     Next appt: 08/30/2022 at 08:00 AM in Radiology ([place] [facility] Scan Department)     Dx: Solitary pulmonary nodule; Stage 3 ch...     0 Result Notes  Details    Reading Physician Reading Date Result Priority   Abimael Reed MD  [phone]  [phone] 8/27/2022 Routine     Narrative & Impression  EXAMINATION:  US RETROPERITONEAL COMPLETE     CLINICAL HISTORY:  Chronic kidney disease, stage 3 unspecified     TECHNIQUE:  Ultrasound of the kidneys and urinary bladder was performed including  color flow and Doppler evaluation of the kidneys.     FINDINGS:  The right and left kidneys measure 10.2 and 11.5 cm respectively.  The right and left resistive indices measure 0.55 and 0.57 which is within normal limits.  There is a 1.5 cm cyst within the superior right kidney.  There is no hydronephrosis or nephrolithiasis.  The left kidney contains a 1.2 cm cyst.  The bladder is unremarkable.     Impression:     Small bilateral renal cysts.        Electronically signed by: Abimael Reed MD  Date:                                            08/27/2022  Time:                                           09:20           Exam Ended: 08/27/22 09:03 Last Resulted: 08/27/22 09:20    George as an Unsuccessful Attempt           Reading Physician Reading Date Result Priority   Darrel Carmona MD  [phone]  [phone] 8/22/2022 Routine   Stacy Tom MD  [phone]  [phone] 8/22/2022      Narrative & Impression  EXAMINATION:  NM MYOCARDIAL PERFUSION SPECT MULTI STUDY     CLINICAL HISTORY:  Heart failure, known or suspected, initial workup;abnormal ECG;  Cardiomyopathy, unspecified     TECHNIQUE:  SPECT images in short, vertical and horizontal long axis were acquired after the injection of 10.6 mCi of Tc-99m Myoview at rest and 10.6 mCi during a cardiac stress. The clinical stress and ECG portion of the study is to be read separately.     CT was performed for attenuation correction.     COMPARISON:  Chest x-ray of today's date and myocardial perfusion 10/07/2016.     FINDINGS:  The quality of the study is good..     Stress SPECT images demonstrate diaphragmatic attenuation of the inferior wall which improves with attenuation correction.  On the resting images, there is matching inferior wall attenuation that improves with correction     The gated post-stress images reveal impaired wall motion and thickening with an estimated LVEF of 29 %. The LV cavity is dilated with an end-diastolic volume of 237 ml and an  end-systolic volume of 170 ml.     CT is notable for an 8 mm left upper lobe nodule.     Impression:     1. No scintigraphic evidence of ischemia or scar..  2. The global left ventricular systolic function is diminished with an LV ejection fraction of 29 % and dilation of the left ventricle.  Wall motion is globally hypokinetic.  3. Incidental 8 mm left upper lobe nodule corresponding with finding on today's chest x-ray.  The lungs are incompletely imaged by attenuation correction CT, and further evaluation with dedicated chest CT is recommended in this former smoker.  This report was flagged in Epic as containing an incidental finding.     I, Darrel Carmona MD, attest that I reviewed and interpreted the images.     Electronically signed by resident: Stacy Tom  Date:                                            08/22/2022  Time:                                           16:06     Electronically signed by: Darrel Carmona  Date:                                            08/22/2022  Time:                                           17:49           Exam Ended: 08/22/22 09:28           Assessment:     1. Dilated cardiomyopathy    2. Chronic combined systolic and diastolic congestive heart failure    3. Mixed hyperlipidemia    4. Mitral valve insufficiency, unspecified etiology    5. Nonspecific abnormal electrocardiogram (ECG) (EKG)    6. Apical mural thrombus      Plan:   Fan was seen today for results.    Diagnoses and all orders for this visit:    Dilated cardiomyopathy    Chronic combined systolic and diastolic congestive heart failure    Mixed hyperlipidemia    Mitral valve insufficiency, unspecified etiology    Nonspecific abnormal electrocardiogram (ECG) (EKG)    Apical mural thrombus    Other orders  -     sacubitriL-valsartan (ENTRESTO) 49-51 mg per tablet; Take 1 tablet by mouth 2 (two) times daily.  -     carvediloL (COREG) 6.25 MG tablet; Take 1 tablet (6.25 mg total) by mouth every 12 (twelve) hours.      Rationale for nephrology consult discussed.  Pt has an appt tomorrow with Dr Vaughn.    CT chest is scheduled tomorrow due to nodule seen on stress test.    Change the furosemide to prn edema or shortness of breath or sudden weight gain.    Increase carvedilol to 6.25 mg bid    Will change valsartan to Entresto 49/51 bid    Pt instructed to never use NSAID's    Rationale for eliquis due to apical mural thrombus discussed    Salt and water restriction discussed    Follow up in about 3 weeks (around 9/19/2022). Lab on return    OK to exercise    Avoid trauma due to anticoagulation    If cardiomyopathy improves and if apical mural thrombus dissolves it may be possible to stop the eliquis in the future.

## 2022-08-30 ENCOUNTER — TELEPHONE (OUTPATIENT)
Dept: CARDIOLOGY | Facility: CLINIC | Age: 62
End: 2022-08-30
Payer: COMMERCIAL

## 2022-08-30 ENCOUNTER — HOSPITAL ENCOUNTER (OUTPATIENT)
Dept: RADIOLOGY | Facility: HOSPITAL | Age: 62
Discharge: HOME | End: 2022-08-30
Attending: INTERNAL MEDICINE
Payer: COMMERCIAL

## 2022-08-30 DIAGNOSIS — R91.1 SOLITARY PULMONARY NODULE: ICD-10-CM

## 2022-08-30 DIAGNOSIS — R91.1 SOLITARY PULMONARY NODULE: Primary | ICD-10-CM

## 2022-08-30 PROCEDURE — 71250 CT THORAX DX C-: CPT | Mod: 26,,, | Performed by: RADIOLOGY

## 2022-08-30 PROCEDURE — 71250 CT CHEST WITHOUT CONTRAST: ICD-10-PCS | Mod: 26,,, | Performed by: RADIOLOGY

## 2022-08-30 PROCEDURE — 71250 CT THORAX DX C-: CPT | Mod: TC

## 2022-08-30 NOTE — TELEPHONE ENCOUNTER
Message left on voicemail:  CT of chest shows benign looking abnormalities for which a repeat CT scan of the chest is recommended in 3 to 6 months to ensure stability of findings.  Will order repeat CT for in 3 to 6 months

## 2022-09-01 ENCOUNTER — PATIENT MESSAGE (OUTPATIENT)
Dept: CARDIOLOGY | Facility: CLINIC | Age: 62
End: 2022-09-01
Payer: COMMERCIAL

## 2022-09-02 ENCOUNTER — LAB VISIT (OUTPATIENT)
Dept: LAB | Facility: HOSPITAL | Age: 62
End: 2022-09-02
Attending: INTERNAL MEDICINE
Payer: COMMERCIAL

## 2022-09-02 DIAGNOSIS — E80.6 BILIRUBINEMIA: ICD-10-CM

## 2022-09-02 DIAGNOSIS — N17.8 ACUTE RENAL FAILURE WITH OTHER SPECIFIED PATHOLOGICAL LESION IN KIDNEY: ICD-10-CM

## 2022-09-02 DIAGNOSIS — R73.9 HYPERGLYCEMIA: ICD-10-CM

## 2022-09-02 DIAGNOSIS — I10 HYPERTENSION, UNSPECIFIED TYPE: ICD-10-CM

## 2022-09-02 DIAGNOSIS — D75.839 THROMBOCYTOSIS: ICD-10-CM

## 2022-09-02 LAB
ALBUMIN SERPL BCP-MCNC: 4.1 G/DL (ref 3.5–5.2)
ALBUMIN SERPL BCP-MCNC: 4.1 G/DL (ref 3.5–5.2)
ALP SERPL-CCNC: 82 U/L (ref 55–135)
ALT SERPL W/O P-5'-P-CCNC: 16 U/L (ref 10–44)
ANION GAP SERPL CALC-SCNC: 10 MMOL/L (ref 8–16)
AST SERPL-CCNC: 17 U/L (ref 10–40)
BASOPHILS # BLD AUTO: 0.05 K/UL (ref 0–0.2)
BASOPHILS NFR BLD: 0.7 % (ref 0–1.9)
BILIRUB DIRECT SERPL-MCNC: 0.4 MG/DL (ref 0.1–0.3)
BILIRUB SERPL-MCNC: 1 MG/DL (ref 0.1–1)
BUN SERPL-MCNC: 19 MG/DL (ref 6–20)
CALCIUM SERPL-MCNC: 9.3 MG/DL (ref 8.7–10.5)
CHLORIDE SERPL-SCNC: 101 MMOL/L (ref 95–110)
CO2 SERPL-SCNC: 29 MMOL/L (ref 23–29)
CREAT CL/1.73 SQ M 12H UR+SERPL-ARVRAT: 100 ML/MIN (ref 70–110)
CREAT SERPL-MCNC: 1.3 MG/DL (ref 0.5–1.4)
CREAT SERPL-MCNC: 1.3 MG/DL (ref 0.5–1.4)
CREAT UR-MCNC: 124.8 MG/DL (ref 23–375)
CREATININE, URINE (MG/SPEC): 1872 MG/SPEC
DIFFERENTIAL METHOD: ABNORMAL
EOSINOPHIL # BLD AUTO: 0.4 K/UL (ref 0–0.5)
EOSINOPHIL NFR BLD: 5.3 % (ref 0–8)
ERYTHROCYTE [DISTWIDTH] IN BLOOD BY AUTOMATED COUNT: 14.8 % (ref 11.5–14.5)
EST. GFR  (NO RACE VARIABLE): >60 ML/MIN/1.73 M^2
ESTIMATED AVG GLUCOSE: 117 MG/DL (ref 68–131)
FERRITIN SERPL-MCNC: 32 NG/ML (ref 20–300)
GLUCOSE SERPL-MCNC: 95 MG/DL (ref 70–110)
HBA1C MFR BLD: 5.7 % (ref 4–5.6)
HBV SURFACE AB SER-ACNC: <3 MIU/ML
HBV SURFACE AB SER-ACNC: NORMAL M[IU]/ML
HBV SURFACE AG SERPL QL IA: NORMAL
HCT VFR BLD AUTO: 50.4 % (ref 40–54)
HCV AB SERPL QL IA: NORMAL
HGB BLD-MCNC: 16.5 G/DL (ref 14–18)
IMM GRANULOCYTES # BLD AUTO: 0.01 K/UL (ref 0–0.04)
IMM GRANULOCYTES NFR BLD AUTO: 0.1 % (ref 0–0.5)
IRON SERPL-MCNC: 108 UG/DL (ref 45–160)
LYMPHOCYTES # BLD AUTO: 1.2 K/UL (ref 1–4.8)
LYMPHOCYTES NFR BLD: 17.4 % (ref 18–48)
MCH RBC QN AUTO: 27.3 PG (ref 27–31)
MCHC RBC AUTO-ENTMCNC: 32.7 G/DL (ref 32–36)
MCV RBC AUTO: 83 FL (ref 82–98)
MONOCYTES # BLD AUTO: 0.6 K/UL (ref 0.3–1)
MONOCYTES NFR BLD: 8.6 % (ref 4–15)
NEUTROPHILS # BLD AUTO: 4.8 K/UL (ref 1.8–7.7)
NEUTROPHILS NFR BLD: 67.9 % (ref 38–73)
NRBC BLD-RTO: 0 /100 WBC
PHOSPHATE SERPL-MCNC: 2.8 MG/DL (ref 2.7–4.5)
PLATELET # BLD AUTO: 320 K/UL (ref 150–450)
PMV BLD AUTO: 8.5 FL (ref 9.2–12.9)
POTASSIUM SERPL-SCNC: 3.9 MMOL/L (ref 3.5–5.1)
PROT SERPL-MCNC: 7.3 G/DL (ref 6–8.4)
PTH-INTACT SERPL-MCNC: 96.9 PG/ML (ref 9–77)
RBC # BLD AUTO: 6.04 M/UL (ref 4.6–6.2)
SATURATED IRON: 24 % (ref 20–50)
SODIUM SERPL-SCNC: 140 MMOL/L (ref 136–145)
TOTAL IRON BINDING CAPACITY: 457 UG/DL (ref 250–450)
TRANSFERRIN SERPL-MCNC: 309 MG/DL (ref 200–375)
TSH SERPL DL<=0.005 MIU/L-ACNC: 1.35 UIU/ML (ref 0.4–4)
URATE SERPL-MCNC: 7.8 MG/DL (ref 3.4–7)
URINE COLLECTION DURATION: 24 HR
URINE VOLUME: 1500 ML
WBC # BLD AUTO: 7 K/UL (ref 3.9–12.7)

## 2022-09-02 PROCEDURE — 83970 ASSAY OF PARATHORMONE: CPT | Performed by: INTERNAL MEDICINE

## 2022-09-02 PROCEDURE — 84075 ASSAY ALKALINE PHOSPHATASE: CPT | Performed by: INTERNAL MEDICINE

## 2022-09-02 PROCEDURE — 84550 ASSAY OF BLOOD/URIC ACID: CPT | Performed by: INTERNAL MEDICINE

## 2022-09-02 PROCEDURE — 87340 HEPATITIS B SURFACE AG IA: CPT | Performed by: INTERNAL MEDICINE

## 2022-09-02 PROCEDURE — 85025 COMPLETE CBC W/AUTO DIFF WBC: CPT | Performed by: INTERNAL MEDICINE

## 2022-09-02 PROCEDURE — 84443 ASSAY THYROID STIM HORMONE: CPT | Performed by: INTERNAL MEDICINE

## 2022-09-02 PROCEDURE — 83036 HEMOGLOBIN GLYCOSYLATED A1C: CPT | Performed by: INTERNAL MEDICINE

## 2022-09-02 PROCEDURE — 84244 ASSAY OF RENIN: CPT | Performed by: INTERNAL MEDICINE

## 2022-09-02 PROCEDURE — 36415 COLL VENOUS BLD VENIPUNCTURE: CPT | Performed by: INTERNAL MEDICINE

## 2022-09-02 PROCEDURE — 84466 ASSAY OF TRANSFERRIN: CPT | Performed by: INTERNAL MEDICINE

## 2022-09-02 PROCEDURE — 80069 RENAL FUNCTION PANEL: CPT | Performed by: INTERNAL MEDICINE

## 2022-09-02 PROCEDURE — 86038 ANTINUCLEAR ANTIBODIES: CPT | Performed by: INTERNAL MEDICINE

## 2022-09-02 PROCEDURE — 86706 HEP B SURFACE ANTIBODY: CPT | Mod: 91 | Performed by: INTERNAL MEDICINE

## 2022-09-02 PROCEDURE — 86803 HEPATITIS C AB TEST: CPT | Performed by: INTERNAL MEDICINE

## 2022-09-02 PROCEDURE — 82728 ASSAY OF FERRITIN: CPT | Performed by: INTERNAL MEDICINE

## 2022-09-02 PROCEDURE — 86592 SYPHILIS TEST NON-TREP QUAL: CPT | Performed by: INTERNAL MEDICINE

## 2022-09-02 PROCEDURE — 82088 ASSAY OF ALDOSTERONE: CPT | Performed by: INTERNAL MEDICINE

## 2022-09-02 PROCEDURE — 82575 CREATININE CLEARANCE TEST: CPT | Performed by: INTERNAL MEDICINE

## 2022-09-04 LAB — RPR SER QL: NORMAL

## 2022-09-06 LAB
ALDOST SERPL-MCNC: 10 NG/DL
ANA SER QL IF: NORMAL

## 2022-09-08 LAB — RENIN PLAS-CCNC: 9.3 NG/ML/H

## 2022-09-16 ENCOUNTER — LAB VISIT (OUTPATIENT)
Dept: LAB | Facility: HOSPITAL | Age: 62
End: 2022-09-16
Attending: INTERNAL MEDICINE
Payer: COMMERCIAL

## 2022-09-16 DIAGNOSIS — R94.31 NONSPECIFIC ABNORMAL ELECTROCARDIOGRAM (ECG) (EKG): ICD-10-CM

## 2022-09-16 DIAGNOSIS — I51.3 APICAL MURAL THROMBUS: ICD-10-CM

## 2022-09-16 DIAGNOSIS — I50.42 CHRONIC COMBINED SYSTOLIC AND DIASTOLIC CONGESTIVE HEART FAILURE: ICD-10-CM

## 2022-09-16 DIAGNOSIS — I42.0 DILATED CARDIOMYOPATHY: ICD-10-CM

## 2022-09-16 DIAGNOSIS — I34.0 MITRAL VALVE INSUFFICIENCY, UNSPECIFIED ETIOLOGY: ICD-10-CM

## 2022-09-16 DIAGNOSIS — E78.2 MIXED HYPERLIPIDEMIA: ICD-10-CM

## 2022-09-16 LAB
ALBUMIN SERPL BCP-MCNC: 4.3 G/DL (ref 3.5–5.2)
ALP SERPL-CCNC: 87 U/L (ref 55–135)
ALT SERPL W/O P-5'-P-CCNC: 17 U/L (ref 10–44)
ANION GAP SERPL CALC-SCNC: 13 MMOL/L (ref 8–16)
AST SERPL-CCNC: 18 U/L (ref 10–40)
BASOPHILS # BLD AUTO: 0.06 K/UL (ref 0–0.2)
BASOPHILS NFR BLD: 1.1 % (ref 0–1.9)
BILIRUB SERPL-MCNC: 0.5 MG/DL (ref 0.1–1)
BUN SERPL-MCNC: 22 MG/DL (ref 6–20)
CALCIUM SERPL-MCNC: 9.5 MG/DL (ref 8.7–10.5)
CHLORIDE SERPL-SCNC: 100 MMOL/L (ref 95–110)
CO2 SERPL-SCNC: 28 MMOL/L (ref 23–29)
CREAT SERPL-MCNC: 1.4 MG/DL (ref 0.5–1.4)
DIFFERENTIAL METHOD: ABNORMAL
EOSINOPHIL # BLD AUTO: 0.4 K/UL (ref 0–0.5)
EOSINOPHIL NFR BLD: 6.6 % (ref 0–8)
ERYTHROCYTE [DISTWIDTH] IN BLOOD BY AUTOMATED COUNT: 15.9 % (ref 11.5–14.5)
EST. GFR  (NO RACE VARIABLE): 58 ML/MIN/1.73 M^2
GLUCOSE SERPL-MCNC: 109 MG/DL (ref 70–110)
HCT VFR BLD AUTO: 50.9 % (ref 40–54)
HGB BLD-MCNC: 16.7 G/DL (ref 14–18)
IMM GRANULOCYTES # BLD AUTO: 0.02 K/UL (ref 0–0.04)
IMM GRANULOCYTES NFR BLD AUTO: 0.4 % (ref 0–0.5)
LYMPHOCYTES # BLD AUTO: 1.4 K/UL (ref 1–4.8)
LYMPHOCYTES NFR BLD: 25.5 % (ref 18–48)
MCH RBC QN AUTO: 27.2 PG (ref 27–31)
MCHC RBC AUTO-ENTMCNC: 32.8 G/DL (ref 32–36)
MCV RBC AUTO: 83 FL (ref 82–98)
MONOCYTES # BLD AUTO: 0.5 K/UL (ref 0.3–1)
MONOCYTES NFR BLD: 9.3 % (ref 4–15)
NEUTROPHILS # BLD AUTO: 3.2 K/UL (ref 1.8–7.7)
NEUTROPHILS NFR BLD: 57.1 % (ref 38–73)
NRBC BLD-RTO: 0 /100 WBC
PLATELET # BLD AUTO: 407 K/UL (ref 150–450)
PMV BLD AUTO: 8.3 FL (ref 9.2–12.9)
POTASSIUM SERPL-SCNC: 4.3 MMOL/L (ref 3.5–5.1)
PROT SERPL-MCNC: 7.7 G/DL (ref 6–8.4)
RBC # BLD AUTO: 6.14 M/UL (ref 4.6–6.2)
SODIUM SERPL-SCNC: 141 MMOL/L (ref 136–145)
WBC # BLD AUTO: 5.57 K/UL (ref 3.9–12.7)

## 2022-09-16 PROCEDURE — 36415 COLL VENOUS BLD VENIPUNCTURE: CPT | Performed by: INTERNAL MEDICINE

## 2022-09-16 PROCEDURE — 85025 COMPLETE CBC W/AUTO DIFF WBC: CPT | Performed by: INTERNAL MEDICINE

## 2022-09-16 PROCEDURE — 80053 COMPREHEN METABOLIC PANEL: CPT | Performed by: INTERNAL MEDICINE

## 2022-09-19 ENCOUNTER — OFFICE VISIT (OUTPATIENT)
Dept: CARDIOLOGY | Facility: CLINIC | Age: 62
End: 2022-09-19
Payer: COMMERCIAL

## 2022-09-19 VITALS
WEIGHT: 206.56 LBS | HEART RATE: 76 BPM | SYSTOLIC BLOOD PRESSURE: 95 MMHG | DIASTOLIC BLOOD PRESSURE: 70 MMHG | BODY MASS INDEX: 30.59 KG/M2 | HEIGHT: 69 IN | OXYGEN SATURATION: 96 %

## 2022-09-19 DIAGNOSIS — G47.33 OSA (OBSTRUCTIVE SLEEP APNEA): ICD-10-CM

## 2022-09-19 DIAGNOSIS — I51.3 APICAL MURAL THROMBUS: ICD-10-CM

## 2022-09-19 DIAGNOSIS — I34.0 MITRAL VALVE INSUFFICIENCY, UNSPECIFIED ETIOLOGY: ICD-10-CM

## 2022-09-19 DIAGNOSIS — I50.42 CHRONIC COMBINED SYSTOLIC AND DIASTOLIC CONGESTIVE HEART FAILURE: ICD-10-CM

## 2022-09-19 DIAGNOSIS — E78.5 HYPERLIPIDEMIA, UNSPECIFIED HYPERLIPIDEMIA TYPE: ICD-10-CM

## 2022-09-19 DIAGNOSIS — R94.31 NONSPECIFIC ABNORMAL ELECTROCARDIOGRAM (ECG) (EKG): ICD-10-CM

## 2022-09-19 DIAGNOSIS — I42.9 CARDIOMYOPATHY, UNSPECIFIED TYPE: Primary | ICD-10-CM

## 2022-09-19 PROCEDURE — 3008F PR BODY MASS INDEX (BMI) DOCUMENTED: ICD-10-PCS | Mod: CPTII,S$GLB,, | Performed by: INTERNAL MEDICINE

## 2022-09-19 PROCEDURE — 3078F PR MOST RECENT DIASTOLIC BLOOD PRESSURE < 80 MM HG: ICD-10-PCS | Mod: CPTII,S$GLB,, | Performed by: INTERNAL MEDICINE

## 2022-09-19 PROCEDURE — 3074F SYST BP LT 130 MM HG: CPT | Mod: CPTII,S$GLB,, | Performed by: INTERNAL MEDICINE

## 2022-09-19 PROCEDURE — 3044F PR MOST RECENT HEMOGLOBIN A1C LEVEL <7.0%: ICD-10-PCS | Mod: CPTII,S$GLB,, | Performed by: INTERNAL MEDICINE

## 2022-09-19 PROCEDURE — 99213 PR OFFICE/OUTPT VISIT, EST, LEVL III, 20-29 MIN: ICD-10-PCS | Mod: S$GLB,,, | Performed by: INTERNAL MEDICINE

## 2022-09-19 PROCEDURE — 3008F BODY MASS INDEX DOCD: CPT | Mod: CPTII,S$GLB,, | Performed by: INTERNAL MEDICINE

## 2022-09-19 PROCEDURE — 1159F PR MEDICATION LIST DOCUMENTED IN MEDICAL RECORD: ICD-10-PCS | Mod: CPTII,S$GLB,, | Performed by: INTERNAL MEDICINE

## 2022-09-19 PROCEDURE — 99213 OFFICE O/P EST LOW 20 MIN: CPT | Mod: S$GLB,,, | Performed by: INTERNAL MEDICINE

## 2022-09-19 PROCEDURE — 1159F MED LIST DOCD IN RCRD: CPT | Mod: CPTII,S$GLB,, | Performed by: INTERNAL MEDICINE

## 2022-09-19 PROCEDURE — 4010F PR ACE/ARB THEARPY RXD/TAKEN: ICD-10-PCS | Mod: CPTII,S$GLB,, | Performed by: INTERNAL MEDICINE

## 2022-09-19 PROCEDURE — 99999 PR PBB SHADOW E&M-EST. PATIENT-LVL III: ICD-10-PCS | Mod: PBBFAC,,, | Performed by: INTERNAL MEDICINE

## 2022-09-19 PROCEDURE — 1160F PR REVIEW ALL MEDS BY PRESCRIBER/CLIN PHARMACIST DOCUMENTED: ICD-10-PCS | Mod: CPTII,S$GLB,, | Performed by: INTERNAL MEDICINE

## 2022-09-19 PROCEDURE — 3074F PR MOST RECENT SYSTOLIC BLOOD PRESSURE < 130 MM HG: ICD-10-PCS | Mod: CPTII,S$GLB,, | Performed by: INTERNAL MEDICINE

## 2022-09-19 PROCEDURE — 99999 PR PBB SHADOW E&M-EST. PATIENT-LVL III: CPT | Mod: PBBFAC,,, | Performed by: INTERNAL MEDICINE

## 2022-09-19 PROCEDURE — 3066F NEPHROPATHY DOC TX: CPT | Mod: CPTII,S$GLB,, | Performed by: INTERNAL MEDICINE

## 2022-09-19 PROCEDURE — 1160F RVW MEDS BY RX/DR IN RCRD: CPT | Mod: CPTII,S$GLB,, | Performed by: INTERNAL MEDICINE

## 2022-09-19 PROCEDURE — 3044F HG A1C LEVEL LT 7.0%: CPT | Mod: CPTII,S$GLB,, | Performed by: INTERNAL MEDICINE

## 2022-09-19 PROCEDURE — 3066F PR DOCUMENTATION OF TREATMENT FOR NEPHROPATHY: ICD-10-PCS | Mod: CPTII,S$GLB,, | Performed by: INTERNAL MEDICINE

## 2022-09-19 PROCEDURE — 4010F ACE/ARB THERAPY RXD/TAKEN: CPT | Mod: CPTII,S$GLB,, | Performed by: INTERNAL MEDICINE

## 2022-09-19 PROCEDURE — 3078F DIAST BP <80 MM HG: CPT | Mod: CPTII,S$GLB,, | Performed by: INTERNAL MEDICINE

## 2022-09-19 RX ORDER — VALSARTAN 40 MG/1
40 TABLET ORAL DAILY
COMMUNITY
End: 2022-09-19

## 2022-09-19 NOTE — PROGRESS NOTES
Subjective:      Patient ID: Fan Chris is a 62 y.o. male.    Chief Complaint: Follow-up and Cardiomyopathy    HPI:   Feels well..    Travelled to [facility] and ran up and down the [place].    Review of Systems   Cardiovascular:  Negative for chest pain, claudication, dyspnea on exertion, irregular heartbeat, leg swelling, near-syncope, orthopnea, palpitations and syncope.      Past Medical History:   Diagnosis Date    Asthma     Bilateral renal cysts     Bronchitis     Cardiomyopathy     CHF (congestive heart failure)     CKD (chronic kidney disease) stage 3, GFR 30-59 ml/min     Hyperlipidemia 10/14/2016    Hypertension     Sinusitis     Sleep apnea         Past Surgical History:   Procedure Laterality Date    arm fracture      EYE SURGERY      TONSILLECTOMY         Family History   Problem Relation Age of Onset    Lung cancer Mother     Cancer Mother 61        lung    Hypertension Father     Hyperlipidemia Father     Heart disease Paternal Grandmother        Social History     Socioeconomic History    Marital status: Single   Tobacco Use    Smoking status: Former     Packs/day: 1.00     Years: 8.00     Pack years: 8.00     Types: Cigarettes     Quit date: 10/4/1984     Years since quittin.9    Smokeless tobacco: Never   Substance and Sexual Activity    Alcohol use: Not Currently     Alcohol/week: 1.0 standard drink     Types: 1 Cans of beer per week    Drug use: No    Sexual activity: Never     Partners: Female   Social History Narrative    Lives alone with his dog. Generally eats outside and indulges in sweets and eats large portions of food. Drinks juice and sweet tea. Drinks 4-5 glasses of water.      Social Determinants of Health     Financial Resource Strain: Low Risk     Difficulty of Paying Living Expenses: Not hard at all   Food Insecurity: No Food Insecurity    Worried About Running Out of Food in the Last Year: Never true    Ran Out of Food in the Last Year: Never true   Transportation Needs: No  Transportation Needs    Lack of Transportation (Medical): No    Lack of Transportation (Non-Medical): No   Physical Activity: Sufficiently Active    Days of Exercise per Week: 4 days    Minutes of Exercise per Session: 50 min   Stress: No Stress Concern Present    Feeling of Stress : Not at all   Social Connections: Unknown    Frequency of Communication with Friends and Family: More than three times a week    Frequency of Social Gatherings with Friends and Family: More than three times a week    Active Member of Clubs or Organizations: Yes    Attends Club or Organization Meetings: More than 4 times per year    Marital Status: Never    Housing Stability: Low Risk     Unable to Pay for Housing in the Last Year: No    Number of Places Lived in the Last Year: 2    Unstable Housing in the Last Year: No       Current Outpatient Medications on File Prior to Visit   Medication Sig Dispense Refill    apixaban (ELIQUIS) 5 mg Tab Take 1 tablet (5 mg total) by mouth 2 (two) times daily. 60 tablet 11    carvediloL (COREG) 6.25 MG tablet Take 1 tablet (6.25 mg total) by mouth every 12 (twelve) hours. 60 tablet 11    fluticasone propionate (FLONASE) 50 mcg/actuation nasal spray 2 sprays by Each Nostril route as needed.      furosemide (LASIX) 20 MG tablet Take 1 tablet (20 mg total) by mouth once daily. As needed for shortness of breath 30 tablet 11    omega-3 fatty acids (FISH OIL CONCENTRATE ORAL) Take by mouth.      sacubitriL-valsartan (ENTRESTO) 49-51 mg per tablet Take 1 tablet by mouth 2 (two) times daily. 60 tablet 11    TESTOSTERONE CYPIONATE IM Inject into the muscle. Twice weekly      atorvastatin (LIPITOR) 40 MG tablet Take 1 tablet (40 mg total) by mouth once daily. (Patient not taking: Reported on 9/19/2022) 90 tablet 3    [DISCONTINUED] valsartan (DIOVAN) 40 MG tablet Take 40 mg by mouth once daily.       No current facility-administered medications on file prior to visit.       Review of patient's allergies  "indicates:  No Known Allergies  Objective:     Vitals:    09/19/22 0909   BP: 95/70   BP Location: Right arm   Patient Position: Sitting   BP Method: Large (Automatic)   Pulse: 76   SpO2: 96%   Weight: 93.7 kg (206 lb 9.1 oz)   Height: 5' 9" (1.753 m)        Physical Exam  Vitals reviewed.   Constitutional:       General: He is not in acute distress.     Appearance: He is well-developed. He is not diaphoretic.   Eyes:      General: No scleral icterus.  Neck:      Vascular: No carotid bruit or JVD.   Cardiovascular:      Rate and Rhythm: Regular rhythm.      Heart sounds: Normal heart sounds. No murmur heard.    No friction rub. No gallop.   Pulmonary:      Effort: Pulmonary effort is normal. No respiratory distress.      Breath sounds: Normal breath sounds.   Musculoskeletal:      Right lower leg: No edema.      Left lower leg: No edema.   Skin:     General: Skin is warm and dry.   Neurological:      Mental Status: He is alert and oriented to person, place, and time.   Psychiatric:         Behavior: Behavior normal.         Thought Content: Thought content normal.         Judgment: Judgment normal.        Component Ref Range & Units 3 d ago   (9/16/22) 2 wk ago   (9/2/22) 4 wk ago   (8/22/22) 1 yr ago   (2/22/21) 2 yr ago   (8/22/20) 2 yr ago   (2/27/20) 2 yr ago   (1/21/20)   WBC 3.90 - 12.70 K/uL 5.57  7.00  7.51  5.31  5.24  5.25  6.08    RBC 4.60 - 6.20 M/uL 6.14  6.04  6.28 High   5.15  5.26  4.90  5.85    Hemoglobin 14.0 - 18.0 g/dL 16.7  16.5  16.9  15.4  16.0  14.3  17.0    Hematocrit 40.0 - 54.0 % 50.9  50.4  52.7  48.7  48.4  43.2  52.5    MCV 82 - 98 fL 83  83  84  95  92  88  90    MCH 27.0 - 31.0 pg 27.2  27.3  26.9 Low   29.9  30.4  29.2  29.1    MCHC 32.0 - 36.0 g/dL 32.8  32.7  32.1  31.6 Low   33.1  33.1  32.4    RDW 11.5 - 14.5 % 15.9 High   14.8 High   15.1 High   15.4 High   14.3  14.0  13.5    Platelets 150 - 450 K/uL 407  320  436  394 High  R  372 High  R  414 High  R  447 High  R    MPV 9.2 - " 12.9 fL 8.3 Low   8.5 Low   8.3 Low   8.8 Low   8.9 Low   8.4 Low   8.8 Low     Immature Granulocytes 0.0 - 0.5 % 0.4  0.1  0.4  0.6 High   0.4  0.6 High   0.5    Gran # (ANC) 1.8 - 7.7 K/uL 3.2  4.8  4.9  2.9  2.9  2.9  3.8    Immature Grans (Abs) 0.00 - 0.04 K/uL 0.02  0.01 CM  0.03 CM  0.03 CM  0.02 CM  0.03 CM  0.03 CM    Comment: Mild elevation in immature granulocytes is non specific and   can be seen in a variety of conditions including stress response,   acute inflammation, trauma and pregnancy. Correlation with other       Results   Comprehensive Metabolic Panel (Acc# [id number]) (Order [id number])  In Basket     Reviewed   Result Note   View in In Basket      MyChart Results Release    MyChart Status: Active  Results Release       Contains abnormal data Comprehensive Metabolic Panel  Order: [id number]  Status: Final result    Visible to patient: Yes (seen)     Next appt: 12/19/2022 at 02:00 PM in Internal Medicine (Opal Ortiz MD)     Dx: Mixed hyperlipidemia; Nonspecific abn...     0 Result Notes  Component Ref Range & Units 3 d ago   (9/16/22) 2 wk ago   (9/2/22) 2 wk ago   (9/2/22) 2 wk ago   (9/2/22) 4 wk ago   (8/22/22) 2 yr ago   (8/22/20) 2 yr ago   (5/25/20)   Sodium 136 - 145 mmol/L 141   140   140  138  138    Potassium 3.5 - 5.1 mmol/L 4.3   3.9   4.4  3.3 Low   4.0    Chloride 95 - 110 mmol/L 100   101   103  100  102    CO2 23 - 29 mmol/L 28   29   24  29  26    Glucose 70 - 110 mg/dL 109   95   102  99  103    BUN 6 - 20 mg/dL 22 High    19   23 High   23 High   18    Creatinine 0.5 - 1.4 mg/dL 1.4   1.3  1.3  1.7 High   1.5 High   1.2    Calcium 8.7 - 10.5 mg/dL 9.5   9.3   9.4  9.1  9.2    Total Protein 6.0 - 8.4 g/dL 7.7  7.3    7.3  7.3  7.3    Albumin 3.5 - 5.2 g/dL 4.3  4.1  4.1   4.2  4.2  4.2    Total Bilirubin 0.1 - 1.0 mg/dL 0.5  1.0 CM    1.1 High  CM  0.9 CM  0.5 CM    Comment: For infants and newborns, interpretation of results should be based   on gestational age, weight  and in agreement with clinical   observations.     Premature Infant recommended reference ranges:   Up to 24 hours.............<8.0 mg/dL   Up to 48 hours............<12.0 mg/dL   3-5 days..................<15.0 mg/dL   6-29 days.................<15.0 mg/dL    Alkaline Phosphatase 55 - 135 U/L 87  82    67  62  67    AST 10 - 40 U/L 18  17    17  29  25    Comment: Specimen slightly hemolyzed   ALT 10 - 44 U/L 17  16    18  35  28    Anion Gap 8 - 16 mmol/L 13   10   13  9  10    eGFR >60 mL/min/1.73 m^2 58 Abnormal    >60   46 Abnormal       Resulting Agency  [facility]              Specimen Collected: 09/16/22 07:28 Last Resulted: 09/16/22 08:36                    Ordering physician: Chiki Mcdaniel MD Study date: 8/22/22     Reason for Exam  Priority: Routine  Dx: Cardiomyopathy, unspecified type [I42.9 (ICD-10-CM)]; Chronic combined systolic and diastolic congestive heart failure [I50.42 (ICD-10-CM)]; Mixed hyperlipidemia [E78.2 (ICD-10-CM)]; Mitral valve insufficiency, unspecified etiology [I34.0 (ICD-10-CM)]; Nonspecific abnormal electrocardiogram (ECG) (EKG) [R94.31 (ICD-10-CM)]; JACLYN (obstructive sleep apnea) [G47.33 (ICD-10-CM)]     Result Image Hyperlink     Show images for Echo Saline Bubble? No  Summary    The left ventricle is moderately enlarged with mild concentric hypertrophy and severely decreased systolic function.  The estimated ejection fraction is 30%.  Grade I left ventricular diastolic dysfunction.  There is left ventricular global hypokinesis and focal mid inferior akinesis and focal anteroapical dyskinesis with an apparent small apical mural thrombus.  Mild right ventricular enlargement with low normal right ventricular systolic function.  Moderate left atrial enlargement.  Mild aortic regurgitation.  Mild-to-moderate mitral regurgitation.  Normal central venous pressure (3 mmHg).  The estimated PA systolic pressure is 28 mmHg.     ICD-10-CM)]; Nonspecific abnormal  electrocardiogram (ECG) (EKG) [R94.31 (ICD-10-CM)]; JACLYN (obstructive sleep apnea) [G47.33 (ICD-10-CM)]     Conclusion         The EKG portion of this study is negative for ischemia.    The patient reported no chest pain during the stress test.    There were no arrhythmias during stress.    The nuclear portion of this study will be reported separately.     Performing Clinician     seen)     Next appt: 08/30/2022 at 08:00 AM in Radiology ([place] CT Scan Department)     Dx: Solitary pulmonary nodule; Stage 3 ch...     0 Result Notes  Details    Reading Physician Reading Date Result Priority   Abmiael Reed MD  [phone]  [phone] 8/27/2022 Routine     Narrative & Impression  EXAMINATION:  US RETROPERITONEAL COMPLETE     CLINICAL HISTORY:  Chronic kidney disease, stage 3 unspecified     TECHNIQUE:  Ultrasound of the kidneys and urinary bladder was performed including color flow and Doppler evaluation of the kidneys.     FINDINGS:  The right and left kidneys measure 10.2 and 11.5 cm respectively.  The right and left resistive indices measure 0.55 and 0.57 which is within normal limits.  There is a 1.5 cm cyst within the superior right kidney.  There is no hydronephrosis or nephrolithiasis.  The left kidney contains a 1.2 cm cyst.  The bladder is unremarkable.     Impression:     Small bilateral renal cysts.        Electronically signed by: Abimael Reed MD  Date:                                            08/27/2022  Time:                                           09:20           Exam Ended: 08/27/22 09:03 Last Resulted: 08/27/22 09:20    George as an Unsuccessful Attempt           Reading Physician Reading Date Result Priority   Darrel Carmona MD  [phone]  [phone] 8/22/2022 Routine   Stayc Tom MD  [phone]  [phone] 8/22/2022      Narrative & Impression  EXAMINATION:  NM MYOCARDIAL PERFUSION SPECT MULTI STUDY     CLINICAL HISTORY:  Heart failure, known or suspected, initial workup;abnormal  ECG;  Cardiomyopathy, unspecified     TECHNIQUE:  SPECT images in short, vertical and horizontal long axis were acquired after the injection of 10.6 mCi of Tc-99m Myoview at rest and 10.6 mCi during a cardiac stress. The clinical stress and ECG portion of the study is to be read separately.     CT was performed for attenuation correction.     COMPARISON:  Chest x-ray of today's date and myocardial perfusion 10/07/2016.     FINDINGS:  The quality of the study is good..     Stress SPECT images demonstrate diaphragmatic attenuation of the inferior wall which improves with attenuation correction.  On the resting images, there is matching inferior wall attenuation that improves with correction     The gated post-stress images reveal impaired wall motion and thickening with an estimated LVEF of 29 %. The LV cavity is dilated with an end-diastolic volume of 237 ml and an end-systolic volume of 170 ml.     CT is notable for an 8 mm left upper lobe nodule.     Impression:     1. No scintigraphic evidence of ischemia or scar..  2. The global left ventricular systolic function is diminished with an LV ejection fraction of 29 % and dilation of the left ventricle.  Wall motion is globally hypokinetic.  3. Incidental 8 mm left upper lobe nodule corresponding with finding on today's chest x-ray.  The lungs are incompletely imaged by attenuation correction CT, and further evaluation with dedicated chest CT is recommended in this former smoker.  This report was flagged in Epic as containing an incidental finding.     I, Darrel Carmona MD, attest that I reviewed and interpreted the images.     Electronically signed by resident: Stacy Tom  Date:                                            08/22/2022  Time:                                           16:06     Electronically signed by: Darrel Carmona  Date:                                            08/22/2022  Time:                                           17:49           Exam Ended:  08/22/22 09:28             Reading Physician Reading Date Result Priority   Esequiel Bess MD  [phone]  [phone] 10/7/2016      Narrative & Impression  Time of Procedure: 10/07/16 07:11:23  Accession # [id number]     LEXISCAN MIBI/TETROFOSMIN MYOCARDIAL PERFUSION SCAN     Indication:  Cardiomyopathy     Technique: SPECT images were acquired after the injection of 10 mCi of Tc-99m sestamibi at rest and 25 mCi during a Lexiscan injection. Heart rate changed from 102 to 134 bpm, and blood pressure changed from 97/65 to 147/73 mm/Hg.    During the stress procedure, shortness of breath was reported.  ECG portion of the stress is to be read seperately.     Findings:    The quality of the study is good VS is compromised by patient motion/GI activity adjacent to the inferior wall.      There is dilatation of the LV cavity (with a TID ratio of 1.1. Poor contractility seen at both rest and stress.  The gated post-stress images reveal impaired wall motion and diminished systolic wall thickening with an estimated LVEF of 16 %. The LV cavity is dilated with an end-diastolic volume of 336 ml and an end-systolic volume of 267 ml.  IMPRESSION:         *  Scintigraphically negative for ischemia and infarct.     *  The global left ventricular systolic function is decreased with an LV ejection fraction of 16 % and left ventricular dilatation. Wall motion is diminished.        Electronically signed by: ESEQUIEL BESS MD  Date:                                            10/07/16  Time:                                           16:42              Exam Ended: 10/07/16 10:03 Last Resulted         And Lateral    Status: Final result         [facility] Results Release    [facility] Status: Active  Results Release           PACS Images for [facility] Viewer     Show images for X-Ray Chest PA And Lateral                All Reviewers List    Chiki Mcdaniel MD on 10/13/2016 10:14       X-Ray Chest PA And Lateral  Order:  [id number]  Status: Final result    Visible to patient: Yes (not seen)    Next appt: None    Dx: Chronic combined systolic and diastol...    0 Result Notes    Details    Reading Physician Reading Date Result Priority   Angela Bergeron MD  [phone] 10/7/2016      Narrative & Impression  2 views obtained.  No comparison.  The cardiac size is moderately enlarged.  There is no pleural effusion.  The osseous structures are intact.  There is no focal consolidation or significant increase in interstitial markings.  IMPRESSION:    Moderate cardiomegaly        Electronically signed by: Angela Bergeron MD  Date:                                            10/07/16  Time:                                           09:42      Lab Visit on 09/16/2022   Component Date Value Ref Range Status    WBC 09/16/2022 5.57  3.90 - 12.70 K/uL Final    RBC 09/16/2022 6.14  4.60 - 6.20 M/uL Final    Hemoglobin 09/16/2022 16.7  14.0 - 18.0 g/dL Final    Hematocrit 09/16/2022 50.9  40.0 - 54.0 % Final    MCV 09/16/2022 83  82 - 98 fL Final    MCH 09/16/2022 27.2  27.0 - 31.0 pg Final    MCHC 09/16/2022 32.8  32.0 - 36.0 g/dL Final    RDW 09/16/2022 15.9 (H)  11.5 - 14.5 % Final    Platelets 09/16/2022 407  150 - 450 K/uL Final    MPV 09/16/2022 8.3 (L)  9.2 - 12.9 fL Final    Immature Granulocytes 09/16/2022 0.4  0.0 - 0.5 % Final    Gran # (ANC) 09/16/2022 3.2  1.8 - 7.7 K/uL Final    Immature Grans (Abs) 09/16/2022 0.02  0.00 - 0.04 K/uL Final    Lymph # 09/16/2022 1.4  1.0 - 4.8 K/uL Final    Mono # 09/16/2022 0.5  0.3 - 1.0 K/uL Final    Eos # 09/16/2022 0.4  0.0 - 0.5 K/uL Final    Baso # 09/16/2022 0.06  0.00 - 0.20 K/uL Final    nRBC 09/16/2022 0  0 /100 WBC Final    Gran % 09/16/2022 57.1  38.0 - 73.0 % Final    Lymph % 09/16/2022 25.5  18.0 - 48.0 % Final    Mono % 09/16/2022 9.3  4.0 - 15.0 % Final    Eosinophil % 09/16/2022 6.6  0.0 - 8.0 % Final    Basophil % 09/16/2022 1.1  0.0 - 1.9 % Final    Differential Method  09/16/2022 Automated   Final    Sodium 09/16/2022 141  136 - 145 mmol/L Final    Potassium 09/16/2022 4.3  3.5 - 5.1 mmol/L Final    Chloride 09/16/2022 100  95 - 110 mmol/L Final    CO2 09/16/2022 28  23 - 29 mmol/L Final    Glucose 09/16/2022 109  70 - 110 mg/dL Final    BUN 09/16/2022 22 (H)  6 - 20 mg/dL Final    Creatinine 09/16/2022 1.4  0.5 - 1.4 mg/dL Final    Calcium 09/16/2022 9.5  8.7 - 10.5 mg/dL Final    Total Protein 09/16/2022 7.7  6.0 - 8.4 g/dL Final    Albumin 09/16/2022 4.3  3.5 - 5.2 g/dL Final    Total Bilirubin 09/16/2022 0.5  0.1 - 1.0 mg/dL Final    Alkaline Phosphatase 09/16/2022 87  55 - 135 U/L Final    AST 09/16/2022 18  10 - 40 U/L Final    ALT 09/16/2022 17  10 - 44 U/L Final    Anion Gap 09/16/2022 13  8 - 16 mmol/L Final    eGFR 09/16/2022 58 (A)  >60 mL/min/1.73 m^2 Final   Lab Visit on 09/02/2022   Component Date Value Ref Range Status    Protein, Urine Random 09/02/2022 7  0 - 15 mg/dL Final    Creatinine, Urine 09/02/2022 153.3  23.0 - 375.0 mg/dL Final    Prot/Creat Ratio, Urine 09/02/2022 0.05  0.00 - 0.20 Final   Lab Visit on 09/02/2022   Component Date Value Ref Range Status    Glucose 09/02/2022 95  70 - 110 mg/dL Final    Sodium 09/02/2022 140  136 - 145 mmol/L Final    Potassium 09/02/2022 3.9  3.5 - 5.1 mmol/L Final    Chloride 09/02/2022 101  95 - 110 mmol/L Final    CO2 09/02/2022 29  23 - 29 mmol/L Final    BUN 09/02/2022 19  6 - 20 mg/dL Final    Calcium 09/02/2022 9.3  8.7 - 10.5 mg/dL Final    Creatinine 09/02/2022 1.3  0.5 - 1.4 mg/dL Final    Albumin 09/02/2022 4.1  3.5 - 5.2 g/dL Final    Phosphorus 09/02/2022 2.8  2.7 - 4.5 mg/dL Final    eGFR 09/02/2022 >60  >60 mL/min/1.73 m^2 Final    Anion Gap 09/02/2022 10  8 - 16 mmol/L Final    TSH 09/02/2022 1.347  0.400 - 4.000 uIU/mL Final    Uric Acid 09/02/2022 7.8 (H)  3.4 - 7.0 mg/dL Final    Iron 09/02/2022 108  45 - 160 ug/dL Final    Transferrin 09/02/2022 309  200 - 375 mg/dL Final    TIBC 09/02/2022 457 (H)   250 - 450 ug/dL Final    Saturated Iron 09/02/2022 24  20 - 50 % Final    Total Protein 09/02/2022 7.3  6.0 - 8.4 g/dL Final    Albumin 09/02/2022 4.1  3.5 - 5.2 g/dL Final    Total Bilirubin 09/02/2022 1.0  0.1 - 1.0 mg/dL Final    Bilirubin, Direct 09/02/2022 0.4 (H)  0.1 - 0.3 mg/dL Final    AST 09/02/2022 17  10 - 40 U/L Final    ALT 09/02/2022 16  10 - 44 U/L Final    Alkaline Phosphatase 09/02/2022 82  55 - 135 U/L Final    Hep B S Ab 09/02/2022 <3.00  mIU/mL Final    Hep B S Ab 09/02/2022 Non-reactive   Final    Hepatitis B Surface Ag 09/02/2022 Non-reactive  Non-reactive Final    Hepatitis C Ab 09/02/2022 Non-reactive  Non-reactive Final    Hemoglobin A1C 09/02/2022 5.7 (H)  4.0 - 5.6 % Final    Estimated Avg Glucose 09/02/2022 117  68 - 131 mg/dL Final    Ferritin 09/02/2022 32  20.0 - 300.0 ng/mL Final    KSENIA Screen 09/02/2022 Negative <1:80  Negative <1:80 Final    Aldosterone 09/02/2022 10.0  ng/dL Final    WBC 09/02/2022 7.00  3.90 - 12.70 K/uL Final    RBC 09/02/2022 6.04  4.60 - 6.20 M/uL Final    Hemoglobin 09/02/2022 16.5  14.0 - 18.0 g/dL Final    Hematocrit 09/02/2022 50.4  40.0 - 54.0 % Final    MCV 09/02/2022 83  82 - 98 fL Final    MCH 09/02/2022 27.3  27.0 - 31.0 pg Final    MCHC 09/02/2022 32.7  32.0 - 36.0 g/dL Final    RDW 09/02/2022 14.8 (H)  11.5 - 14.5 % Final    Platelets 09/02/2022 320  150 - 450 K/uL Final    MPV 09/02/2022 8.5 (L)  9.2 - 12.9 fL Final    Immature Granulocytes 09/02/2022 0.1  0.0 - 0.5 % Final    Gran # (ANC) 09/02/2022 4.8  1.8 - 7.7 K/uL Final    Immature Grans (Abs) 09/02/2022 0.01  0.00 - 0.04 K/uL Final    Lymph # 09/02/2022 1.2  1.0 - 4.8 K/uL Final    Mono # 09/02/2022 0.6  0.3 - 1.0 K/uL Final    Eos # 09/02/2022 0.4  0.0 - 0.5 K/uL Final    Baso # 09/02/2022 0.05  0.00 - 0.20 K/uL Final    nRBC 09/02/2022 0  0 /100 WBC Final    Gran % 09/02/2022 67.9  38.0 - 73.0 % Final    Lymph % 09/02/2022 17.4 (L)  18.0 - 48.0 % Final    Mono % 09/02/2022 8.6  4.0 - 15.0  % Final    Eosinophil % 09/02/2022 5.3  0.0 - 8.0 % Final    Basophil % 09/02/2022 0.7  0.0 - 1.9 % Final    Differential Method 09/02/2022 Automated   Final    Renin Activity 09/02/2022 9.3  ng/mL/h Final    RPR 09/02/2022 Non-reactive  Non-reactive Final    PTH, Intact 09/02/2022 96.9 (H)  9.0 - 77.0 pg/mL Final    Urine Volume 09/02/2022 1500  mL Final    Urine Collection Duration 09/02/2022 24  Hr Final    Creatinine, Urine 09/02/2022 124.8  23.0 - 375.0 mg/dL Final    Creatinine Clearance 09/02/2022 100  70 - 110 mL/min Final    Creatinine, Urinr (mg/spec) 09/02/2022 1872.0  mg/Spec Final    Creatinine 09/02/2022 1.3  0.5 - 1.4 mg/dL Final   Lab Visit on 08/22/2022   Component Date Value Ref Range Status    WBC 08/22/2022 7.51  3.90 - 12.70 K/uL Final    RBC 08/22/2022 6.28 (H)  4.60 - 6.20 M/uL Final    Hemoglobin 08/22/2022 16.9  14.0 - 18.0 g/dL Final    Hematocrit 08/22/2022 52.7  40.0 - 54.0 % Final    MCV 08/22/2022 84  82 - 98 fL Final    MCH 08/22/2022 26.9 (L)  27.0 - 31.0 pg Final    MCHC 08/22/2022 32.1  32.0 - 36.0 g/dL Final    RDW 08/22/2022 15.1 (H)  11.5 - 14.5 % Final    Platelets 08/22/2022 436  150 - 450 K/uL Final    MPV 08/22/2022 8.3 (L)  9.2 - 12.9 fL Final    Immature Granulocytes 08/22/2022 0.4  0.0 - 0.5 % Final    Gran # (ANC) 08/22/2022 4.9  1.8 - 7.7 K/uL Final    Immature Grans (Abs) 08/22/2022 0.03  0.00 - 0.04 K/uL Final    Lymph # 08/22/2022 1.7  1.0 - 4.8 K/uL Final    Mono # 08/22/2022 0.6  0.3 - 1.0 K/uL Final    Eos # 08/22/2022 0.3  0.0 - 0.5 K/uL Final    Baso # 08/22/2022 0.07  0.00 - 0.20 K/uL Final    nRBC 08/22/2022 0  0 /100 WBC Final    Gran % 08/22/2022 65.0  38.0 - 73.0 % Final    Lymph % 08/22/2022 22.1  18.0 - 48.0 % Final    Mono % 08/22/2022 8.1  4.0 - 15.0 % Final    Eosinophil % 08/22/2022 3.5  0.0 - 8.0 % Final    Basophil % 08/22/2022 0.9  0.0 - 1.9 % Final    Differential Method 08/22/2022 Automated   Final    Sodium 08/22/2022 140  136 - 145 mmol/L Final     Potassium 08/22/2022 4.4  3.5 - 5.1 mmol/L Final    Chloride 08/22/2022 103  95 - 110 mmol/L Final    CO2 08/22/2022 24  23 - 29 mmol/L Final    Glucose 08/22/2022 102  70 - 110 mg/dL Final    BUN 08/22/2022 23 (H)  6 - 20 mg/dL Final    Creatinine 08/22/2022 1.7 (H)  0.5 - 1.4 mg/dL Final    Calcium 08/22/2022 9.4  8.7 - 10.5 mg/dL Final    Total Protein 08/22/2022 7.3  6.0 - 8.4 g/dL Final    Albumin 08/22/2022 4.2  3.5 - 5.2 g/dL Final    Total Bilirubin 08/22/2022 1.1 (H)  0.1 - 1.0 mg/dL Final    Alkaline Phosphatase 08/22/2022 67  55 - 135 U/L Final    AST 08/22/2022 17  10 - 40 U/L Final    ALT 08/22/2022 18  10 - 44 U/L Final    Anion Gap 08/22/2022 13  8 - 16 mmol/L Final    eGFR 08/22/2022 46 (A)  >60 mL/min/1.73 m^2 Final    Cholesterol 08/22/2022 317 (H)  120 - 199 mg/dL Final    Triglycerides 08/22/2022 262 (H)  30 - 150 mg/dL Final    HDL 08/22/2022 37 (L)  40 - 75 mg/dL Final    LDL Cholesterol 08/22/2022 227.6 (H)  63.0 - 159.0 mg/dL Final    HDL/Cholesterol Ratio 08/22/2022 11.7 (L)  20.0 - 50.0 % Final    Total Cholesterol/HDL Ratio 08/22/2022 8.6 (H)  2.0 - 5.0 Final    Non-HDL Cholesterol 08/22/2022 280  mg/dL Final    TSH 08/22/2022 1.433  0.400 - 4.000 uIU/mL Final    BNP 08/22/2022 81  0 - 99 pg/mL Final   Hospital Outpatient Visit on 08/22/2022   Component Date Value Ref Range Status    85% Max Predicted HR 08/22/2022 136   Final    Max Predicted HR 08/22/2022 160   Final    [facility] CV CPX PATIENT IS MALE 08/22/2022 1.0   Final    [facility] CV CPX PATIENT IS FEMALE 08/22/2022 0.0   Final    HR at rest 08/22/2022 64  bpm Final    Systolic blood pressure 08/22/2022 153  mmHg Final    Diastolic blood pressure 08/22/2022 81  mmHg Final    RPP 08/22/2022 9,792   Final    Exercise duration (min) 08/22/2022 10  minutes Final    Peak HR 08/22/2022 155  bpm Final    Peak Systolic BP 08/22/2022 153  mmHg Final    Peak Diatolic BP 08/22/2022 81  mmHg Final    Peak RPP 08/22/2022 23,715   Final    Estimated  METs 08/22/2022 12   Final    % Max HR Achieved 08/22/2022 97   Final    1 Minute Recovery HR 08/22/2022 133  bpm Final   Hospital Outpatient Visit on 08/22/2022   Component Date Value Ref Range Status    Ascending aorta 08/22/2022 2.94  cm Final    STJ 08/22/2022 2.88  cm Final    AV mean gradient 08/22/2022 3  mmHg Final    Ao peak teddy 08/22/2022 1.15  m/s Final    Ao VTI 08/22/2022 22.18  cm Final    IVS 08/22/2022 1.54 (A)  0.6 - 1.1 cm Final    LA size 08/22/2022 4.70  cm Final    Left Atrium Major Axis 08/22/2022 4.67  cm Final    Left Atrium Minor Axis 08/22/2022 5.03  cm Final    LVIDd 08/22/2022 5.62  3.5 - 6.0 cm Final    LVIDs 08/22/2022 4.85 (A)  2.1 - 4.0 cm Final    LVOT diameter 08/22/2022 2.34  cm Final    LVOT peak VTI 08/22/2022 12.16  cm Final    Posterior Wall 08/22/2022 1.28 (A)  0.6 - 1.1 cm Final    MV Peak A Teddy 08/22/2022 0.75  m/s Final    E wave deceleration time 08/22/2022 187.08  msec Final    MV Peak E Teddy 08/22/2022 0.59  m/s Final    RA Major Axis 08/22/2022 4.23  cm Final    RA Width 08/22/2022 3.98  cm Final    RVDD 08/22/2022 2.80  cm Final    TAPSE 08/22/2022 1.04  cm Final    TDI LATERAL 08/22/2022 0.04  m/s Final    TDI SEPTAL 08/22/2022 0.04  m/s Final    LA WIDTH 08/22/2022 4.10  cm Final    Ao root annulus 08/22/2022 4.38  cm Final    PV PEAK VELOCITY 08/22/2022 0.91  cm/s Final    MV stenosis pressure 1/2 time 08/22/2022 54.25  ms Final    LV Diastolic Volume 08/22/2022 154.99  mL Final    LV Systolic Volume 08/22/2022 110.27  mL Final    LVOT peak teddy 08/22/2022 0.66  m/s Final    LA volume (mod) 08/22/2022 53.82  cm3 Final    MV mean gradient 08/22/2022 1  mmHg Final    MV peak gradient 08/22/2022 2  mmHg Final    RV S' 08/22/2022 6.57  cm/s Final    MV VTI 08/22/2022 18.62  cm Final    LV LATERAL E/E' RATIO 08/22/2022 14.75  m/s Final    LV SEPTAL E/E' RATIO 08/22/2022 14.75  m/s Final    FS 08/22/2022 14  % Final    LA volume 08/22/2022 79.33  cm3 Final    LV mass  08/22/2022 353.12  g Final    Left Ventricle Relative Wall Thick* 08/22/2022 0.46  cm Final    AV valve area 08/22/2022 2.36  cm2 Final    AV Velocity Ratio 08/22/2022 0.57   Final    AV index (prosthetic) 08/22/2022 0.55   Final    MV valve area p 1/2 method 08/22/2022 4.06  cm2 Final    MV valve area by continuity eq 08/22/2022 2.81  cm2 Final    E/A ratio 08/22/2022 0.79   Final    Mean e' 08/22/2022 0.04  m/s Final    LVOT area 08/22/2022 4.3  cm2 Final    LVOT stroke volume 08/22/2022 52.27  cm3 Final    AV peak gradient 08/22/2022 5  mmHg Final    E/E' ratio 08/22/2022 14.75  m/s Final    BSA 08/22/2022 2.13  m2 Final    LV Systolic Volume Index 08/22/2022 53.3  mL/m2 Final    LV Diastolic Volume Index 08/22/2022 74.87  mL/m2 Final    LA Volume Index 08/22/2022 38.3  mL/m2 Final    LV Mass Index 08/22/2022 171  g/m2 Final    LA Volume Index (Mod) 08/22/2022 26.0  mL/m2 Final    Right Atrial Pressure (from IVC) 08/22/2022 3  mmHg Final    EF 08/22/2022 30  % Final    TV rest pulmonary artery pressure 08/22/2022 28  mmHg Final    TR Max Teddy 08/22/2022 2.50  m/s Final    Triscuspid Valve Regurgitation Pea* 08/22/2022 25  mmHg Final   (      Result Notes    1 Patient Communication    Component Ref Range & Units 1 yr ago   (8/22/20) 2 yr ago   (5/25/20) 2 yr ago   (2/18/20) 2 yr ago   (1/21/20) 3 yr ago   (11/13/18) 5 yr ago   (10/24/16) 5 yr ago   (10/4/16)   Sodium 136 - 145 mmol/L 138  138  141  139  141  139 R  139 R    Potassium 3.5 - 5.1 mmol/L 3.3 Low   4.0  3.7  4.3  3.8  4.5 R  3.9 R    Chloride 95 - 110 mmol/L 100  102  103  98  101  102 R  96 Low  R    CO2 23 - 29 mmol/L 29  26  28  25  28  27 R  30 R    Glucose 70 - 110 mg/dL 99  103  98  100  113 High   91 R, CM  83 R, CM    BUN 6 - 20 mg/dL 23 High   18  16  19  18  28 High  R  18 R    Creatinine 0.5 - 1.4 mg/dL 1.5 High   1.2  1.2  1.4  1.1  1.21 R, CM  1.70 High  R, CM    Calcium 8.7 - 10.5 mg/dL 9.1  9.2  9.6  10.0  9.4  9.7 R  9.7 R    Total  Protein 6.0 - 8.4 g/dL 7.3  7.3   7.9  7.2  7.0 R  6.9 R    Albumin 3.5 - 5.2 g/dL 4.2  4.2   4.5  3.7  4.5 R  4.8 R    Total Bilirubin 0.1 - 1.0 mg/dL 0.9  0.5 CM   0.9 CM  0.8 CM  1.4 High  R  2.3 High  R    Comment: For infants and newborns, interpretation of results should be based   on gestational age, weight and in agreement with clinical   observations.   Premature Infant recommended reference ranges:   Up to 24 hours.............<8.0 mg/dL   Up to 48 hours............<12.0 mg/dL   3-5 days..................<15.0 mg/dL   6-29 days.................<15.0 mg/dL    Alkaline Phosphatase 55 - 135 U/L 62  67   79  75  57 R  56 R    AST 10 - 40 U/L 29  25   24  25  21 R  24 R    ALT 10 - 44 U/L 35  28   30  28  28 R  28 R    Anion Gap 8 - 16 mmol/L 9  10  10  16  12      eGFR if African American >60 mL/min/1.73 m^2 58.5 Abnormal   >60.0  >60.0  >60.0  >60.0  78 R  52 Low  R    eGFR if non African American >60 mL/min/1.73 m^2 50.6 Abnormal   >60.0 CM  >60.0 CM  55.0 Abnormal  CM  >60.0 CM  67 R  45 Low  R    Comment: Calculation used to obtain the estimated glomerular filtration   rate (eGFR) is the CKD-EPI equation.    Resulting Agency  [facility]              Specimen Collected: 08/22/20 08:32 Last Resulted: 08/22/20          (2/22/21)1 yr ago   (8/22/20)2 yr ago   (2/27/20)2 yr ago   (1/21/20)5 yr ago   (10/4/16)12 yr ago   (10/2/09) WBC3.90 - 12.70 K/uL5.31 5.24 5.25 6.08 8.0 R 4.46 Low  R RBC4.60 - 6.20 M/uL5.15 5.26 4.90 5.85 6.07 High  R 5.22 Rxlgbqgcwz47.0 - 18.0 g/dL15.4 16.0 14.3 17.0 18.0 High  R 15.5 R Aijletebed93.0 - 54.0 %48.7 48.4 43.2 52.5 56.5 High  R 45.5 MCV82 - 98 fL95 92 88 90 93.0 R 87.2 R MCH27.0 - 31.0 pg29.9 30.4 29.2 29.1 29.6 R 29.7 R MCHC32.0 - 36.0 g/dL31.6 Low  33.1 33.1 32.4 31.8 Low  34.1 R RDW11.5 - 14.5 %15.4 High  14.3 14.0 13.5 14.9 R 13.1 Yepdclhcv942 - 350 K/uL394 High  372 High  414 High  447 High  425 High  R 445 High  MPV9.2 - 12.9 fL8.8 Low  8.9 Low   8.4 Low  8.8 Low  7.4 Low  R 9.1 Low  Immature Granulocytes0.0 - 0.5 %0.6 High  0.4 0.6 High  0.5 Gran # (ANC)1.8 - 7.7 K/uL2.9 2.9 2.9 3.8 2.3 Immature Grans (Abs)0.00 - 0.04 K/uL0.03 0.02 CM 0.03 CM 0.03 CM Comment: Mild elevation in immature granulocytes is non specific and   can be seen in a variety of conditions including stress response,   acute inflammation, trauma and pregnancy Communication    1  Topic    Component Ref Range & Units 1 yr ago   (5/21/21) 1 yr ago   (2/22/21) 1 yr ago   (11/27/20) 1 yr ago   (8/22/20) 2 yr ago   (5/25/20) 2 yr ago   (1/21/20) 3 yr ago   (11/13/18)   Cholesterol 120 - 199 mg/dL 199  289 High  CM  272 High  CM  256 High  CM  300 High  CM  314 High  CM  238 High  CM    Comment: The National Cholesterol Education Program (NCEP) has set the   following guidelines (reference ranges) for Cholesterol:   Optimal.....................<200 mg/dL   Borderline High.............200-239 mg/dL   High........................> or = 240 mg/dL    Triglycerides 30 - 150 mg/dL 144  180 High  CM  289 High  CM  297 High  CM  448 High  CM  257 High  CM  127 CM    Comment: The National Cholesterol Education Program (NCEP) has set the   following guidelines (reference values) for triglycerides:   Normal......................<150 mg/dL   Borderline High.............150-199 mg/dL   High........................200-499 mg/dL    HDL 40 - 75 mg/dL 43  39 Low  CM  37 Low  CM  36 Low  CM  36 Low  CM  38 Low  CM  47 CM    Comment: The National Cholesterol Education Program (NCEP) has set the   following guidelines (reference values) for HDL Cholesterol:   Low...............<40 mg/dL   Optimal...........>60 mg/dL    LDL Cholesterol 63.0 - 159.0 mg/dL 127.2  214.0 High  CM  177.2 High  CM  160.6 High  CM  Invalid, Trig>400.0 CM  224.6 High  CM  165.6 High  CM    Comment: The National Cholesterol Education Program (NCEP) has set the   following guidelines (reference values) for LDL Cholesterol:    Optimal.......................<130 mg/dL   Borderline High...............130-159 mg/dL   High..........................160-189 mg/dL   Very High.....................>190 mg/dL    HDL/Cholesterol Ratio 20.0 - 50.0 % 21.6  13.5 Low   13.6 Low   14.1 Low   12.0 Low   12.1 Low   19.7 Low     Total Cholesterol/HDL Ratio 2.0 - 5.0 4.6  7.4 High   7.4 High   7.1 High   8.3 High   8.3 High   5.1 High     Non-HDL Cholesterol mg/dL 156  250 CM  235 CM  220 CM  264 CM  276 CM  191 CM    Comment: Risk category and Non-HDL cholesterol goals:   Coronary heart disease (CHD)or equivalent (10-year risk of CHD >20%):   Non-HDL cholesterol goal     <130 mg/dL   Two or more CHD risk factors and 10-year risk of CHD <= 20%:   Non-HDL cholesterol goal     <160 mg/dL   0 to 1 CHD risk factor:   Non-HDL cholesterol goal     <190 mg/dL           Assessment:     1. Cardiomyopathy, unspecified type    2. Apical mural thrombus    3. Chronic combined systolic and diastolic congestive heart failure    4. Hyperlipidemia, unspecified hyperlipidemia type    5. Mitral valve insufficiency, unspecified etiology    6. Nonspecific abnormal electrocardiogram (ECG) (EKG)    7. JACLYN (obstructive sleep apnea)      Plan:   Fan was seen today for follow-up and cardiomyopathy.    Diagnoses and all orders for this visit:    Cardiomyopathy, unspecified type  -     CBC Auto Differential; Future  -     Comprehensive Metabolic Panel; Future  -     Lipid Panel; Future  -     TSH; Future  -     Echo Saline Bubble? No; Future    Apical mural thrombus  -     CBC Auto Differential; Future  -     Comprehensive Metabolic Panel; Future  -     Lipid Panel; Future  -     TSH; Future  -     Echo Saline Bubble? No; Future    Chronic combined systolic and diastolic congestive heart failure  -     CBC Auto Differential; Future  -     Comprehensive Metabolic Panel; Future  -     Lipid Panel; Future  -     TSH; Future  -     Echo Saline Bubble? No; Future    Hyperlipidemia,  unspecified hyperlipidemia type  -     CBC Auto Differential; Future  -     Comprehensive Metabolic Panel; Future  -     Lipid Panel; Future  -     TSH; Future  -     Echo Saline Bubble? No; Future    Mitral valve insufficiency, unspecified etiology  -     CBC Auto Differential; Future  -     Comprehensive Metabolic Panel; Future  -     Lipid Panel; Future  -     TSH; Future  -     Echo Saline Bubble? No; Future    Nonspecific abnormal electrocardiogram (ECG) (EKG)  -     CBC Auto Differential; Future  -     Comprehensive Metabolic Panel; Future  -     Lipid Panel; Future  -     TSH; Future  -     Echo Saline Bubble? No; Future    JACLYN (obstructive sleep apnea)  -     CBC Auto Differential; Future  -     Comprehensive Metabolic Panel; Future  -     Lipid Panel; Future  -     TSH; Future  -     Echo Saline Bubble? No; Future         Meds reviewed with pt     Pt to take the atorvastatin daily and to not take the plain valsartan 40 mg daily    Pt to only take the furosemide prn edema or shortness of breath (due to lowish BP)    Rest of meds the same    RTC 2 months with lab and echocardiogram    Follow up in about 2 months (around 11/19/2022).

## 2022-11-23 ENCOUNTER — HOSPITAL ENCOUNTER (OUTPATIENT)
Dept: CARDIOLOGY | Facility: HOSPITAL | Age: 62
Discharge: HOME | End: 2022-11-23
Attending: INTERNAL MEDICINE
Payer: COMMERCIAL

## 2022-11-23 VITALS — BODY MASS INDEX: 30.51 KG/M2 | HEART RATE: 75 BPM | WEIGHT: 206 LBS | HEIGHT: 69 IN

## 2022-11-23 DIAGNOSIS — G47.33 OSA (OBSTRUCTIVE SLEEP APNEA): ICD-10-CM

## 2022-11-23 DIAGNOSIS — E78.5 HYPERLIPIDEMIA, UNSPECIFIED HYPERLIPIDEMIA TYPE: ICD-10-CM

## 2022-11-23 DIAGNOSIS — I50.42 CHRONIC COMBINED SYSTOLIC AND DIASTOLIC CONGESTIVE HEART FAILURE: ICD-10-CM

## 2022-11-23 DIAGNOSIS — I51.3 APICAL MURAL THROMBUS: ICD-10-CM

## 2022-11-23 DIAGNOSIS — I34.0 MITRAL VALVE INSUFFICIENCY, UNSPECIFIED ETIOLOGY: ICD-10-CM

## 2022-11-23 DIAGNOSIS — R94.31 NONSPECIFIC ABNORMAL ELECTROCARDIOGRAM (ECG) (EKG): ICD-10-CM

## 2022-11-23 DIAGNOSIS — I42.9 CARDIOMYOPATHY, UNSPECIFIED TYPE: ICD-10-CM

## 2022-11-23 PROCEDURE — 93306 TTE W/DOPPLER COMPLETE: CPT | Mod: 26,,, | Performed by: INTERNAL MEDICINE

## 2022-11-23 PROCEDURE — 93306 TTE W/DOPPLER COMPLETE: CPT

## 2022-11-23 PROCEDURE — 93306 ECHO (CUPID ONLY): ICD-10-PCS | Mod: 26,,, | Performed by: INTERNAL MEDICINE

## 2022-11-28 ENCOUNTER — OFFICE VISIT (OUTPATIENT)
Dept: CARDIOLOGY | Facility: CLINIC | Age: 62
End: 2022-11-28
Payer: COMMERCIAL

## 2022-11-28 VITALS
OXYGEN SATURATION: 98 % | SYSTOLIC BLOOD PRESSURE: 113 MMHG | BODY MASS INDEX: 30.83 KG/M2 | WEIGHT: 208.13 LBS | HEIGHT: 69 IN | DIASTOLIC BLOOD PRESSURE: 74 MMHG | HEART RATE: 69 BPM

## 2022-11-28 DIAGNOSIS — E78.2 MIXED HYPERLIPIDEMIA: ICD-10-CM

## 2022-11-28 DIAGNOSIS — I51.3 APICAL MURAL THROMBUS: ICD-10-CM

## 2022-11-28 DIAGNOSIS — R94.31 NONSPECIFIC ABNORMAL ELECTROCARDIOGRAM (ECG) (EKG): ICD-10-CM

## 2022-11-28 DIAGNOSIS — I50.42 CHRONIC COMBINED SYSTOLIC AND DIASTOLIC CONGESTIVE HEART FAILURE: ICD-10-CM

## 2022-11-28 DIAGNOSIS — I42.9 CARDIOMYOPATHY, UNSPECIFIED TYPE: Primary | ICD-10-CM

## 2022-11-28 DIAGNOSIS — I34.0 MITRAL VALVE INSUFFICIENCY, UNSPECIFIED ETIOLOGY: ICD-10-CM

## 2022-11-28 LAB
ASCENDING AORTA: 3.28 CM
AV INDEX (PROSTH): 0.49
AV MEAN GRADIENT: 3 MMHG
AV PEAK GRADIENT: 5 MMHG
AV REGURGITATION PRESSURE HALF TIME: 2646.55 MS
AV VALVE AREA: 2.28 CM2
AV VELOCITY RATIO: 0.48
BSA FOR ECHO PROCEDURE: 2.13 M2
CV ECHO LV RWT: 0.59 CM
DOP CALC AO PEAK VEL: 1.13 M/S
DOP CALC AO VTI: 20.1 CM
DOP CALC LVOT AREA: 4.6 CM2
DOP CALC LVOT DIAMETER: 2.43 CM
DOP CALC LVOT PEAK VEL: 0.54 M/S
DOP CALC LVOT STROKE VOLUME: 45.89 CM3
DOP CALCLVOT PEAK VEL VTI: 9.9 CM
E WAVE DECELERATION TIME: 291.35 MSEC
E/A RATIO: 0.53
E/E' RATIO: 9.25 M/S
ECHO LV POSTERIOR WALL: 1.6 CM (ref 0.6–1.1)
EJECTION FRACTION: 35 %
FRACTIONAL SHORTENING: -66 % (ref 28–44)
INTERVENTRICULAR SEPTUM: 1.6 CM (ref 0.6–1.1)
IVRT: 139.87 MSEC
LA MAJOR: 5.02 CM
LA MINOR: 5.16 CM
LA WIDTH: 3.6 CM
LEFT ATRIUM SIZE: 4.14 CM
LEFT ATRIUM VOLUME INDEX MOD: 24.1 ML/M2
LEFT ATRIUM VOLUME INDEX: 30.8 ML/M2
LEFT ATRIUM VOLUME MOD: 50.31 CM3
LEFT ATRIUM VOLUME: 64.47 CM3
LEFT INTERNAL DIMENSION IN SYSTOLE: 9 CM (ref 2.1–4)
LEFT VENTRICLE DIASTOLIC VOLUME INDEX: 68.08 ML/M2
LEFT VENTRICLE DIASTOLIC VOLUME: 142.29 ML
LEFT VENTRICLE MASS INDEX: 192 G/M2
LEFT VENTRICLE SYSTOLIC VOLUME INDEX: 43.1 ML/M2
LEFT VENTRICLE SYSTOLIC VOLUME: 90 ML
LEFT VENTRICULAR INTERNAL DIMENSION IN DIASTOLE: 5.42 CM (ref 3.5–6)
LEFT VENTRICULAR MASS: 401.03 G
LV LATERAL E/E' RATIO: 12.33 M/S
LV SEPTAL E/E' RATIO: 7.4 M/S
LVOT MG: 0.91 MMHG
LVOT MV: 0.47 CM/S
MV PEAK A VEL: 0.7 M/S
MV PEAK E VEL: 0.37 M/S
MV STENOSIS PRESSURE HALF TIME: 84.49 MS
MV VALVE AREA P 1/2 METHOD: 2.6 CM2
PISA AR MAX VEL: 3 M/S
PISA TR MAX VEL: 1.72 M/S
RA MAJOR: 4.02 CM
RA PRESSURE: 3 MMHG
RA WIDTH: 3.65 CM
RIGHT VENTRICULAR END-DIASTOLIC DIMENSION: 2.8 CM
RV TISSUE DOPPLER FREE WALL SYSTOLIC VELOCITY 1 (APICAL 4 CHAMBER VIEW): 0.01 CM/S
STJ: 3 CM
TDI LATERAL: 0.03 M/S
TDI SEPTAL: 0.05 M/S
TDI: 0.04 M/S
TR MAX PG: 12 MMHG
TRICUSPID ANNULAR PLANE SYSTOLIC EXCURSION: 1.29 CM
TV REST PULMONARY ARTERY PRESSURE: 15 MMHG

## 2022-11-28 PROCEDURE — 99999 PR PBB SHADOW E&M-EST. PATIENT-LVL III: CPT | Mod: PBBFAC,,, | Performed by: INTERNAL MEDICINE

## 2022-11-28 PROCEDURE — 1160F PR REVIEW ALL MEDS BY PRESCRIBER/CLIN PHARMACIST DOCUMENTED: ICD-10-PCS | Mod: CPTII,S$GLB,, | Performed by: INTERNAL MEDICINE

## 2022-11-28 PROCEDURE — 99999 PR PBB SHADOW E&M-EST. PATIENT-LVL III: ICD-10-PCS | Mod: PBBFAC,,, | Performed by: INTERNAL MEDICINE

## 2022-11-28 PROCEDURE — 3008F PR BODY MASS INDEX (BMI) DOCUMENTED: ICD-10-PCS | Mod: CPTII,S$GLB,, | Performed by: INTERNAL MEDICINE

## 2022-11-28 PROCEDURE — 3044F PR MOST RECENT HEMOGLOBIN A1C LEVEL <7.0%: ICD-10-PCS | Mod: CPTII,S$GLB,, | Performed by: INTERNAL MEDICINE

## 2022-11-28 PROCEDURE — 1159F MED LIST DOCD IN RCRD: CPT | Mod: CPTII,S$GLB,, | Performed by: INTERNAL MEDICINE

## 2022-11-28 PROCEDURE — 3044F HG A1C LEVEL LT 7.0%: CPT | Mod: CPTII,S$GLB,, | Performed by: INTERNAL MEDICINE

## 2022-11-28 PROCEDURE — 93000 EKG 12-LEAD: ICD-10-PCS | Mod: S$GLB,,, | Performed by: INTERNAL MEDICINE

## 2022-11-28 PROCEDURE — 3078F DIAST BP <80 MM HG: CPT | Mod: CPTII,S$GLB,, | Performed by: INTERNAL MEDICINE

## 2022-11-28 PROCEDURE — 1159F PR MEDICATION LIST DOCUMENTED IN MEDICAL RECORD: ICD-10-PCS | Mod: CPTII,S$GLB,, | Performed by: INTERNAL MEDICINE

## 2022-11-28 PROCEDURE — 99214 OFFICE O/P EST MOD 30 MIN: CPT | Mod: 25,S$GLB,, | Performed by: INTERNAL MEDICINE

## 2022-11-28 PROCEDURE — 1160F RVW MEDS BY RX/DR IN RCRD: CPT | Mod: CPTII,S$GLB,, | Performed by: INTERNAL MEDICINE

## 2022-11-28 PROCEDURE — 4010F PR ACE/ARB THEARPY RXD/TAKEN: ICD-10-PCS | Mod: CPTII,S$GLB,, | Performed by: INTERNAL MEDICINE

## 2022-11-28 PROCEDURE — 3008F BODY MASS INDEX DOCD: CPT | Mod: CPTII,S$GLB,, | Performed by: INTERNAL MEDICINE

## 2022-11-28 PROCEDURE — 3066F PR DOCUMENTATION OF TREATMENT FOR NEPHROPATHY: ICD-10-PCS | Mod: CPTII,S$GLB,, | Performed by: INTERNAL MEDICINE

## 2022-11-28 PROCEDURE — 3074F PR MOST RECENT SYSTOLIC BLOOD PRESSURE < 130 MM HG: ICD-10-PCS | Mod: CPTII,S$GLB,, | Performed by: INTERNAL MEDICINE

## 2022-11-28 PROCEDURE — 99214 PR OFFICE/OUTPT VISIT, EST, LEVL IV, 30-39 MIN: ICD-10-PCS | Mod: 25,S$GLB,, | Performed by: INTERNAL MEDICINE

## 2022-11-28 PROCEDURE — 3066F NEPHROPATHY DOC TX: CPT | Mod: CPTII,S$GLB,, | Performed by: INTERNAL MEDICINE

## 2022-11-28 PROCEDURE — 3074F SYST BP LT 130 MM HG: CPT | Mod: CPTII,S$GLB,, | Performed by: INTERNAL MEDICINE

## 2022-11-28 PROCEDURE — 4010F ACE/ARB THERAPY RXD/TAKEN: CPT | Mod: CPTII,S$GLB,, | Performed by: INTERNAL MEDICINE

## 2022-11-28 PROCEDURE — 3078F PR MOST RECENT DIASTOLIC BLOOD PRESSURE < 80 MM HG: ICD-10-PCS | Mod: CPTII,S$GLB,, | Performed by: INTERNAL MEDICINE

## 2022-11-28 PROCEDURE — 93000 ELECTROCARDIOGRAM COMPLETE: CPT | Mod: S$GLB,,, | Performed by: INTERNAL MEDICINE

## 2022-11-28 RX ORDER — SACUBITRIL AND VALSARTAN 49; 51 MG/1; MG/1
1 TABLET, FILM COATED ORAL 2 TIMES DAILY
Qty: 180 TABLET | Refills: 3 | Status: SHIPPED | OUTPATIENT
Start: 2022-11-28

## 2022-11-28 RX ORDER — ASPIRIN 81 MG/1
81 TABLET ORAL DAILY
Qty: 30 TABLET | Refills: 11 | Status: SHIPPED | OUTPATIENT
Start: 2022-11-28 | End: 2023-11-28

## 2022-11-28 NOTE — PROGRESS NOTES
Subjective:      Patient ID: Fan Chris is a 62 y.o. male.    Chief Complaint: Follow-up    HPI:  Feels great.    Very active    Boxes a lot  (no head contact)    Review of Systems   Cardiovascular:  Negative for chest pain, claudication, dyspnea on exertion, irregular heartbeat, leg swelling, near-syncope, orthopnea, palpitations and syncope.        Pt is not taking the furosemide since he has not been swollen        Past Medical History:   Diagnosis Date    Asthma     Bilateral renal cysts     Bronchitis     Cardiomyopathy     CHF (congestive heart failure)     CKD (chronic kidney disease) stage 3, GFR 30-59 ml/min     Hyperlipidemia 10/14/2016    Hypertension     Sinusitis     Sleep apnea         Past Surgical History:   Procedure Laterality Date    arm fracture      EYE SURGERY      TONSILLECTOMY         Family History   Problem Relation Age of Onset    Lung cancer Mother     Cancer Mother 61        lung    Hypertension Father     Hyperlipidemia Father     Heart disease Paternal Grandmother        Social History     Socioeconomic History    Marital status: Single   Tobacco Use    Smoking status: Former     Packs/day: 1.00     Years: 8.00     Pack years: 8.00     Types: Cigarettes     Quit date: 10/4/1984     Years since quittin.1    Smokeless tobacco: Never   Substance and Sexual Activity    Alcohol use: Not Currently     Alcohol/week: 1.0 standard drink     Types: 1 Cans of beer per week    Drug use: No    Sexual activity: Never     Partners: Female   Social History Narrative    Lives alone with his dog. Generally eats outside and indulges in sweets and eats large portions of food. Drinks juice and sweet tea. Drinks 4-5 glasses of water.      Social Determinants of Health     Financial Resource Strain: Low Risk     Difficulty of Paying Living Expenses: Not hard at all   Food Insecurity: No Food Insecurity    Worried About Running Out of Food in the Last Year: Never true    Ran Out of Food in the Last  Year: Never true   Transportation Needs: No Transportation Needs    Lack of Transportation (Medical): No    Lack of Transportation (Non-Medical): No   Physical Activity: Sufficiently Active    Days of Exercise per Week: 4 days    Minutes of Exercise per Session: 50 min   Stress: No Stress Concern Present    Feeling of Stress : Not at all   Social Connections: Unknown    Frequency of Communication with Friends and Family: More than three times a week    Frequency of Social Gatherings with Friends and Family: More than three times a week    Active Member of Clubs or Organizations: Yes    Attends Club or Organization Meetings: More than 4 times per year    Marital Status: Never    Housing Stability: Low Risk     Unable to Pay for Housing in the Last Year: No    Number of Places Lived in the Last Year: 2    Unstable Housing in the Last Year: No       Current Outpatient Medications on File Prior to Visit   Medication Sig Dispense Refill    atorvastatin (LIPITOR) 40 MG tablet Take 1 tablet (40 mg total) by mouth once daily. 90 tablet 3    carvediloL (COREG) 6.25 MG tablet Take 1 tablet (6.25 mg total) by mouth every 12 (twelve) hours. 60 tablet 11    fluticasone propionate (FLONASE) 50 mcg/actuation nasal spray 2 sprays by Each Nostril route as needed.      furosemide (LASIX) 20 MG tablet Take 1 tablet (20 mg total) by mouth once daily. As needed for shortness of breath 30 tablet 11    sacubitriL-valsartan (ENTRESTO) 49-51 mg per tablet Take 1 tablet by mouth 2 (two) times daily. 60 tablet 11    TESTOSTERONE CYPIONATE IM Inject into the muscle. Twice weekly      [DISCONTINUED] apixaban (ELIQUIS) 5 mg Tab Take 1 tablet (5 mg total) by mouth 2 (two) times daily. 60 tablet 11    omega-3 fatty acids (FISH OIL CONCENTRATE ORAL) Take by mouth.       No current facility-administered medications on file prior to visit.       Review of patient's allergies indicates:  No Known Allergies  Objective:     Vitals:    11/28/22  "0812   BP: 113/74   BP Location: Left arm   Patient Position: Sitting   BP Method: Large (Automatic)   Pulse: 69   SpO2: 98%   Weight: 94.4 kg (208 lb 1.8 oz)   Height: 5' 9" (1.753 m)        Physical Exam  Vitals reviewed.   Constitutional:       General: He is not in acute distress.     Appearance: He is well-developed. He is not diaphoretic.   Eyes:      General: No scleral icterus.  Neck:      Vascular: No carotid bruit or JVD.   Cardiovascular:      Rate and Rhythm: Regular rhythm.      Heart sounds: Normal heart sounds. No murmur heard.    No friction rub. No gallop.   Pulmonary:      Effort: Pulmonary effort is normal. No respiratory distress.      Breath sounds: Normal breath sounds.   Musculoskeletal:      Right lower leg: No edema.      Left lower leg: No edema.   Skin:     General: Skin is warm and dry.   Neurological:      Mental Status: He is alert and oriented to person, place, and time.   Psychiatric:         Behavior: Behavior normal.         Thought Content: Thought content normal.         Judgment: Judgment normal.      ECG today: NSR, LVH, nonspecific T wave abnromality  Accession #: [id number]  Transthoracic echo (TTE) complete  Order# [id number]  Reading physician: Chiki Mcdaniel MD Ordering physician: Chiki Mcdaniel MD Study date: 11/23/22     Reason for Exam  Priority: Routine  Dx: Cardiomyopathy, unspecified type [I42.9 (ICD-10-CM)]; Apical mural thrombus [I51.3 (ICD-10-CM)]; Chronic combined systolic and diastolic congestive heart failure [I50.42 (ICD-10-CM)]; Hyperlipidemia, unspecified hyperlipidemia type [E78.5 (ICD-10-CM)]; Mitral valve insufficiency, unspecified etiology [I34.0 (ICD-10-CM)]; Nonspecific abnormal electrocardiogram (ECG) (EKG) [R94.31 (ICD-10-CM)]; JACLYN (obstructive sleep apnea) [G47.33 (ICD-10-CM)]     View Images Vital Vitrea     Show images for Echo Saline Bubble? No  Summary    The left ventricle is mildly enlarged with moderate concentric hypertrophy and " moderately decreased systolic function.  Normal right ventricular size with normal right ventricular systolic function.  The estimated ejection fraction is 35%.  Grade I left ventricular diastolic dysfunction.  There is left ventricular global hypokinesis.  Mild tricuspid regurgitation.  Mild mitral regurgitation.  Normal central venous pressure (3 mmHg).  The estimated PA systolic pressure is 15 mmHg.       Hospital Outpatient Visit on 11/23/2022   Component Date Value Ref Range Status    Ascending aorta 11/23/2022 3.28  cm Final    STJ 11/23/2022 3.00  cm Final    AV mean gradient 11/23/2022 3  mmHg Final    Ao peak teddy 11/23/2022 1.13  m/s Final    Ao VTI 11/23/2022 20.1  cm Final    IVRT 11/23/2022 139.87  msec Final    IVS 11/23/2022 1.60 (A)  0.6 - 1.1 cm Final    LA size 11/23/2022 4.14  cm Final    Left Atrium Major Axis 11/23/2022 5.02  cm Final    Left Atrium Minor Axis 11/23/2022 5.16  cm Final    LVIDd 11/23/2022 5.42  3.5 - 6.0 cm Final    LVIDs 11/23/2022 9.00 (A)  2.1 - 4.0 cm Final    LVOT diameter 11/23/2022 2.43  cm Final    LVOT peak VTI 11/23/2022 9.90  cm Final    Posterior Wall 11/23/2022 1.60  0.6 - 1.1 cm Final    MV Peak A Teddy 11/23/2022 0.70  m/s Final    E wave deceleration time 11/23/2022 291.35  msec Final    MV Peak E Teddy 11/23/2022 0.37  m/s Final    RA Major Cuddy 11/23/2022 4.02  cm Final    RA Width 11/23/2022 3.65  cm Final    TAPSE 11/23/2022 1.29  cm Final    TR Max Teddy 11/23/2022 1.72  m/s Final    LA WIDTH 11/23/2022 3.60  cm Final    MV stenosis pressure 1/2 time 11/23/2022 84.49  ms Final    LV Diastolic Volume 11/23/2022 142.29  mL Final    LV Systolic Volume 11/23/2022 90.00  mL Final    LVOT peak teddy 11/23/2022 0.54  m/s Final    TDI LATERAL 11/23/2022 0.03  m/s Final    TDI SEPTAL 11/23/2022 0.05  m/s Final    LA volume (mod) 11/23/2022 50.31  cm3 Final    RV S' 11/23/2022 0.01  cm/s Final    LV LATERAL E/E' RATIO 11/23/2022 12.33  m/s Final    LV SEPTAL E/E' RATIO  11/23/2022 7.40  m/s Final    FS 11/23/2022 -66  % Final    LA volume 11/23/2022 64.47  cm3 Final    LV mass 11/23/2022 401.03  g Final    Left Ventricle Relative Wall Thick* 11/23/2022 0.59  cm Final    AV valve area 11/23/2022 2.28  cm2 Final    AV Velocity Ratio 11/23/2022 0.48   Final    AV index (prosthetic) 11/23/2022 0.49   Final    MV valve area p 1/2 method 11/23/2022 2.60  cm2 Final    E/A ratio 11/23/2022 0.53   Final    Mean e' 11/23/2022 0.04  m/s Final    LVOT area 11/23/2022 4.6  cm2 Final    LVOT stroke volume 11/23/2022 45.89  cm3 Final    AV peak gradient 11/23/2022 5  mmHg Final    E/E' ratio 11/23/2022 9.25  m/s Final    Triscuspid Valve Regurgitation Pea* 11/23/2022 12  mmHg Final    BSA 11/23/2022 2.13  m2 Final    LV Systolic Volume Index 11/23/2022 43.1  mL/m2 Final    LV Diastolic Volume Index 11/23/2022 68.08  mL/m2 Final    LA Volume Index 11/23/2022 30.8  mL/m2 Final    LV Mass Index 11/23/2022 192  g/m2 Final    LA Volume Index (Mod) 11/23/2022 24.1  mL/m2 Final    Left Ventricular Outflow Tract Amy* 11/23/2022 0.47  cm/s Final    Left Ventricular Outflow Tract Amy* 11/23/2022 0.91  mmHg Final    AV regurgitation pressure 1/2 time 11/23/2022 2,646.642299401800930  ms Final    AR Max Teddy 11/23/2022 3.00  m/s Final    Right Atrial Pressure (from IVC) 11/23/2022 3  mmHg Final    EF 11/23/2022 35  % Final    RVDD 11/23/2022 2.80  cm Final    TV rest pulmonary artery pressure 11/23/2022 15  mmHg Final   Lab Visit on 11/23/2022   Component Date Value Ref Range Status    WBC 11/23/2022 4.18  3.90 - 12.70 K/uL Final    RBC 11/23/2022 5.94  4.60 - 6.20 M/uL Final    Hemoglobin 11/23/2022 17.4  14.0 - 18.0 g/dL Final    Hematocrit 11/23/2022 52.1  40.0 - 54.0 % Final    MCV 11/23/2022 88  82 - 98 fL Final    MCH 11/23/2022 29.3  27.0 - 31.0 pg Final    MCHC 11/23/2022 33.4  32.0 - 36.0 g/dL Final    RDW 11/23/2022 17.0 (H)  11.5 - 14.5 % Final    Platelets 11/23/2022 374  150 - 450 K/uL Final     MPV 11/23/2022 8.2 (L)  9.2 - 12.9 fL Final    Immature Granulocytes 11/23/2022 1.0 (H)  0.0 - 0.5 % Final    Gran # (ANC) 11/23/2022 2.2  1.8 - 7.7 K/uL Final    Immature Grans (Abs) 11/23/2022 0.04  0.00 - 0.04 K/uL Final    Lymph # 11/23/2022 1.3  1.0 - 4.8 K/uL Final    Mono # 11/23/2022 0.5  0.3 - 1.0 K/uL Final    Eos # 11/23/2022 0.2  0.0 - 0.5 K/uL Final    Baso # 11/23/2022 0.06  0.00 - 0.20 K/uL Final    nRBC 11/23/2022 0  0 /100 WBC Final    Gran % 11/23/2022 51.7  38.0 - 73.0 % Final    Lymph % 11/23/2022 30.6  18.0 - 48.0 % Final    Mono % 11/23/2022 11.2  4.0 - 15.0 % Final    Eosinophil % 11/23/2022 4.1  0.0 - 8.0 % Final    Basophil % 11/23/2022 1.4  0.0 - 1.9 % Final    Differential Method 11/23/2022 Automated   Final    Sodium 11/23/2022 136  136 - 145 mmol/L Final    Potassium 11/23/2022 4.2  3.5 - 5.1 mmol/L Final    Chloride 11/23/2022 103  95 - 110 mmol/L Final    CO2 11/23/2022 23  23 - 29 mmol/L Final    Glucose 11/23/2022 114 (H)  70 - 110 mg/dL Final    BUN 11/23/2022 17  6 - 20 mg/dL Final    Creatinine 11/23/2022 1.4  0.5 - 1.4 mg/dL Final    Calcium 11/23/2022 9.1  8.7 - 10.5 mg/dL Final    Total Protein 11/23/2022 6.9  6.0 - 8.4 g/dL Final    Albumin 11/23/2022 4.0  3.5 - 5.2 g/dL Final    Total Bilirubin 11/23/2022 1.4 (H)  0.1 - 1.0 mg/dL Final    Alkaline Phosphatase 11/23/2022 61  55 - 135 U/L Final    AST 11/23/2022 20  10 - 40 U/L Final    ALT 11/23/2022 25  10 - 44 U/L Final    Anion Gap 11/23/2022 10  8 - 16 mmol/L Final    eGFR 11/23/2022 58 (A)  >60 mL/min/1.73 m^2 Final    Cholesterol 11/23/2022 244 (H)  120 - 199 mg/dL Final    Triglycerides 11/23/2022 131  30 - 150 mg/dL Final    HDL 11/23/2022 41  40 - 75 mg/dL Final    LDL Cholesterol 11/23/2022 176.8 (H)  63.0 - 159.0 mg/dL Final    HDL/Cholesterol Ratio 11/23/2022 16.8 (L)  20.0 - 50.0 % Final    Total Cholesterol/HDL Ratio 11/23/2022 6.0 (H)  2.0 - 5.0 Final    Non-HDL Cholesterol 11/23/2022 203  mg/dL Final    TSH  11/23/2022 1.479  0.400 - 4.000 uIU/mL Final   Lab Visit on 09/16/2022   Component Date Value Ref Range Status    WBC 09/16/2022 5.57  3.90 - 12.70 K/uL Final    RBC 09/16/2022 6.14  4.60 - 6.20 M/uL Final    Hemoglobin 09/16/2022 16.7  14.0 - 18.0 g/dL Final    Hematocrit 09/16/2022 50.9  40.0 - 54.0 % Final    MCV 09/16/2022 83  82 - 98 fL Final    MCH 09/16/2022 27.2  27.0 - 31.0 pg Final    MCHC 09/16/2022 32.8  32.0 - 36.0 g/dL Final    RDW 09/16/2022 15.9 (H)  11.5 - 14.5 % Final    Platelets 09/16/2022 407  150 - 450 K/uL Final    MPV 09/16/2022 8.3 (L)  9.2 - 12.9 fL Final    Immature Granulocytes 09/16/2022 0.4  0.0 - 0.5 % Final    Gran # (ANC) 09/16/2022 3.2  1.8 - 7.7 K/uL Final    Immature Grans (Abs) 09/16/2022 0.02  0.00 - 0.04 K/uL Final    Lymph # 09/16/2022 1.4  1.0 - 4.8 K/uL Final    Mono # 09/16/2022 0.5  0.3 - 1.0 K/uL Final    Eos # 09/16/2022 0.4  0.0 - 0.5 K/uL Final    Baso # 09/16/2022 0.06  0.00 - 0.20 K/uL Final    nRBC 09/16/2022 0  0 /100 WBC Final    Gran % 09/16/2022 57.1  38.0 - 73.0 % Final    Lymph % 09/16/2022 25.5  18.0 - 48.0 % Final    Mono % 09/16/2022 9.3  4.0 - 15.0 % Final    Eosinophil % 09/16/2022 6.6  0.0 - 8.0 % Final    Basophil % 09/16/2022 1.1  0.0 - 1.9 % Final    Differential Method 09/16/2022 Automated   Final    Sodium 09/16/2022 141  136 - 145 mmol/L Final    Potassium 09/16/2022 4.3  3.5 - 5.1 mmol/L Final    Chloride 09/16/2022 100  95 - 110 mmol/L Final    CO2 09/16/2022 28  23 - 29 mmol/L Final    Glucose 09/16/2022 109  70 - 110 mg/dL Final    BUN 09/16/2022 22 (H)  6 - 20 mg/dL Final    Creatinine 09/16/2022 1.4  0.5 - 1.4 mg/dL Final    Calcium 09/16/2022 9.5  8.7 - 10.5 mg/dL Final    Total Protein 09/16/2022 7.7  6.0 - 8.4 g/dL Final    Albumin 09/16/2022 4.3  3.5 - 5.2 g/dL Final    Total Bilirubin 09/16/2022 0.5  0.1 - 1.0 mg/dL Final    Alkaline Phosphatase 09/16/2022 87  55 - 135 U/L Final    AST 09/16/2022 18  10 - 40 U/L Final    ALT 09/16/2022  17  10 - 44 U/L Final    Anion Gap 09/16/2022 13  8 - 16 mmol/L Final    eGFR 09/16/2022 58 (A)  >60 mL/min/1.73 m^2 Final   Lab Visit on 09/02/2022   Component Date Value Ref Range Status    Protein, Urine Random 09/02/2022 7  0 - 15 mg/dL Final    Creatinine, Urine 09/02/2022 153.3  23.0 - 375.0 mg/dL Final    Prot/Creat Ratio, Urine 09/02/2022 0.05  0.00 - 0.20 Final   Lab Visit on 09/02/2022   Component Date Value Ref Range Status    Glucose 09/02/2022 95  70 - 110 mg/dL Final    Sodium 09/02/2022 140  136 - 145 mmol/L Final    Potassium 09/02/2022 3.9  3.5 - 5.1 mmol/L Final    Chloride 09/02/2022 101  95 - 110 mmol/L Final    CO2 09/02/2022 29  23 - 29 mmol/L Final    BUN 09/02/2022 19  6 - 20 mg/dL Final    Calcium 09/02/2022 9.3  8.7 - 10.5 mg/dL Final    Creatinine 09/02/2022 1.3  0.5 - 1.4 mg/dL Final    Albumin 09/02/2022 4.1  3.5 - 5.2 g/dL Final    Phosphorus 09/02/2022 2.8  2.7 - 4.5 mg/dL Final    eGFR 09/02/2022 >60  >60 mL/min/1.73 m^2 Final    Anion Gap 09/02/2022 10  8 - 16 mmol/L Final    TSH 09/02/2022 1.347  0.400 - 4.000 uIU/mL Final    Uric Acid 09/02/2022 7.8 (H)  3.4 - 7.0 mg/dL Final    Iron 09/02/2022 108  45 - 160 ug/dL Final    Transferrin 09/02/2022 309  200 - 375 mg/dL Final    TIBC 09/02/2022 457 (H)  250 - 450 ug/dL Final    Saturated Iron 09/02/2022 24  20 - 50 % Final    Total Protein 09/02/2022 7.3  6.0 - 8.4 g/dL Final    Albumin 09/02/2022 4.1  3.5 - 5.2 g/dL Final    Total Bilirubin 09/02/2022 1.0  0.1 - 1.0 mg/dL Final    Bilirubin, Direct 09/02/2022 0.4 (H)  0.1 - 0.3 mg/dL Final    AST 09/02/2022 17  10 - 40 U/L Final    ALT 09/02/2022 16  10 - 44 U/L Final    Alkaline Phosphatase 09/02/2022 82  55 - 135 U/L Final    Hep B S Ab 09/02/2022 <3.00  mIU/mL Final    Hep B S Ab 09/02/2022 Non-reactive   Final    Hepatitis B Surface Ag 09/02/2022 Non-reactive  Non-reactive Final    Hepatitis C Ab 09/02/2022 Non-reactive  Non-reactive Final    Hemoglobin A1C 09/02/2022 5.7 (H)  4.0  - 5.6 % Final    Estimated Avg Glucose 09/02/2022 117  68 - 131 mg/dL Final    Ferritin 09/02/2022 32  20.0 - 300.0 ng/mL Final    KSENIA Screen 09/02/2022 Negative <1:80  Negative <1:80 Final    Aldosterone 09/02/2022 10.0  ng/dL Final    WBC 09/02/2022 7.00  3.90 - 12.70 K/uL Final    RBC 09/02/2022 6.04  4.60 - 6.20 M/uL Final    Hemoglobin 09/02/2022 16.5  14.0 - 18.0 g/dL Final    Hematocrit 09/02/2022 50.4  40.0 - 54.0 % Final    MCV 09/02/2022 83  82 - 98 fL Final    MCH 09/02/2022 27.3  27.0 - 31.0 pg Final    MCHC 09/02/2022 32.7  32.0 - 36.0 g/dL Final    RDW 09/02/2022 14.8 (H)  11.5 - 14.5 % Final    Platelets 09/02/2022 320  150 - 450 K/uL Final    MPV 09/02/2022 8.5 (L)  9.2 - 12.9 fL Final    Immature Granulocytes 09/02/2022 0.1  0.0 - 0.5 % Final    Gran # (ANC) 09/02/2022 4.8  1.8 - 7.7 K/uL Final    Immature Grans (Abs) 09/02/2022 0.01  0.00 - 0.04 K/uL Final    Lymph # 09/02/2022 1.2  1.0 - 4.8 K/uL Final    Mono # 09/02/2022 0.6  0.3 - 1.0 K/uL Final    Eos # 09/02/2022 0.4  0.0 - 0.5 K/uL Final    Baso # 09/02/2022 0.05  0.00 - 0.20 K/uL Final    nRBC 09/02/2022 0  0 /100 WBC Final    Gran % 09/02/2022 67.9  38.0 - 73.0 % Final    Lymph % 09/02/2022 17.4 (L)  18.0 - 48.0 % Final    Mono % 09/02/2022 8.6  4.0 - 15.0 % Final    Eosinophil % 09/02/2022 5.3  0.0 - 8.0 % Final    Basophil % 09/02/2022 0.7  0.0 - 1.9 % Final    Differential Method 09/02/2022 Automated   Final    Renin Activity 09/02/2022 9.3  ng/mL/h Final    RPR 09/02/2022 Non-reactive  Non-reactive Final    PTH, Intact 09/02/2022 96.9 (H)  9.0 - 77.0 pg/mL Final    Urine Volume 09/02/2022 1500  mL Final    Urine Collection Duration 09/02/2022 24  Hr Final    Creatinine, Urine 09/02/2022 124.8  23.0 - 375.0 mg/dL Final    Creatinine Clearance 09/02/2022 100  70 - 110 mL/min Final    Creatinine, Urinr (mg/spec) 09/02/2022 1872.0  mg/Spec Final    Creatinine 09/02/2022 1.3  0.5 - 1.4 mg/dL Final   Lab Visit on 08/22/2022   Component Date  Value Ref Range Status    WBC 08/22/2022 7.51  3.90 - 12.70 K/uL Final    RBC 08/22/2022 6.28 (H)  4.60 - 6.20 M/uL Final    Hemoglobin 08/22/2022 16.9  14.0 - 18.0 g/dL Final    Hematocrit 08/22/2022 52.7  40.0 - 54.0 % Final    MCV 08/22/2022 84  82 - 98 fL Final    MCH 08/22/2022 26.9 (L)  27.0 - 31.0 pg Final    MCHC 08/22/2022 32.1  32.0 - 36.0 g/dL Final    RDW 08/22/2022 15.1 (H)  11.5 - 14.5 % Final    Platelets 08/22/2022 436  150 - 450 K/uL Final    MPV 08/22/2022 8.3 (L)  9.2 - 12.9 fL Final    Immature Granulocytes 08/22/2022 0.4  0.0 - 0.5 % Final    Gran # (ANC) 08/22/2022 4.9  1.8 - 7.7 K/uL Final    Immature Grans (Abs) 08/22/2022 0.03  0.00 - 0.04 K/uL Final    Lymph # 08/22/2022 1.7  1.0 - 4.8 K/uL Final    Mono # 08/22/2022 0.6  0.3 - 1.0 K/uL Final    Eos # 08/22/2022 0.3  0.0 - 0.5 K/uL Final    Baso # 08/22/2022 0.07  0.00 - 0.20 K/uL Final    nRBC 08/22/2022 0  0 /100 WBC Final    Gran % 08/22/2022 65.0  38.0 - 73.0 % Final    Lymph % 08/22/2022 22.1  18.0 - 48.0 % Final    Mono % 08/22/2022 8.1  4.0 - 15.0 % Final    Eosinophil % 08/22/2022 3.5  0.0 - 8.0 % Final    Basophil % 08/22/2022 0.9  0.0 - 1.9 % Final    Differential Method 08/22/2022 Automated   Final    Sodium 08/22/2022 140  136 - 145 mmol/L Final    Potassium 08/22/2022 4.4  3.5 - 5.1 mmol/L Final    Chloride 08/22/2022 103  95 - 110 mmol/L Final    CO2 08/22/2022 24  23 - 29 mmol/L Final    Glucose 08/22/2022 102  70 - 110 mg/dL Final    BUN 08/22/2022 23 (H)  6 - 20 mg/dL Final    Creatinine 08/22/2022 1.7 (H)  0.5 - 1.4 mg/dL Final    Calcium 08/22/2022 9.4  8.7 - 10.5 mg/dL Final    Total Protein 08/22/2022 7.3  6.0 - 8.4 g/dL Final    Albumin 08/22/2022 4.2  3.5 - 5.2 g/dL Final    Total Bilirubin 08/22/2022 1.1 (H)  0.1 - 1.0 mg/dL Final    Alkaline Phosphatase 08/22/2022 67  55 - 135 U/L Final    AST 08/22/2022 17  10 - 40 U/L Final    ALT 08/22/2022 18  10 - 44 U/L Final    Anion Gap 08/22/2022 13  8 - 16 mmol/L Final     eGFR 08/22/2022 46 (A)  >60 mL/min/1.73 m^2 Final    Cholesterol 08/22/2022 317 (H)  120 - 199 mg/dL Final    Triglycerides 08/22/2022 262 (H)  30 - 150 mg/dL Final    HDL 08/22/2022 37 (L)  40 - 75 mg/dL Final    LDL Cholesterol 08/22/2022 227.6 (H)  63.0 - 159.0 mg/dL Final    HDL/Cholesterol Ratio 08/22/2022 11.7 (L)  20.0 - 50.0 % Final    Total Cholesterol/HDL Ratio 08/22/2022 8.6 (H)  2.0 - 5.0 Final    Non-HDL Cholesterol 08/22/2022 280  mg/dL Final    TSH 08/22/2022 1.433  0.400 - 4.000 uIU/mL Final    BNP 08/22/2022 81  0 - 99 pg/mL Final   Hospital Outpatient Visit on 08/22/2022   Component Date Value Ref Range Status    85% Max Predicted HR 08/22/2022 136   Final    Max Predicted HR 08/22/2022 160   Final    [facility] CV CPX PATIENT IS MALE 08/22/2022 1.0   Final    [facility] CV CPX PATIENT IS FEMALE 08/22/2022 0.0   Final    HR at rest 08/22/2022 64  bpm Final    Systolic blood pressure 08/22/2022 153  mmHg Final    Diastolic blood pressure 08/22/2022 81  mmHg Final    RPP 08/22/2022 9,792   Final    Exercise duration (min) 08/22/2022 10  minutes Final    Peak HR 08/22/2022 155  bpm Final    Peak Systolic BP 08/22/2022 153  mmHg Final    Peak Diatolic BP 08/22/2022 81  mmHg Final    Peak RPP 08/22/2022 23,715   Final    Estimated METs 08/22/2022 12   Final    % Max HR Achieved 08/22/2022 97   Final    1 Minute Recovery HR 08/22/2022 133  bpm Final   Hospital Outpatient Visit on 08/22/2022   Component Date Value Ref Range Status    Ascending aorta 08/22/2022 2.94  cm Final    STJ 08/22/2022 2.88  cm Final    AV mean gradient 08/22/2022 3  mmHg Final    Ao peak sonia 08/22/2022 1.15  m/s Final    Ao VTI 08/22/2022 22.18  cm Final    IVS 08/22/2022 1.54 (A)  0.6 - 1.1 cm Final    LA size 08/22/2022 4.70  cm Final    Left Atrium Major Axis 08/22/2022 4.67  cm Final    Left Atrium Minor Axis 08/22/2022 5.03  cm Final    LVIDd 08/22/2022 5.62  3.5 - 6.0 cm Final    LVIDs 08/22/2022 4.85 (A)  2.1 - 4.0 cm Final    LVOT  diameter 08/22/2022 2.34  cm Final    LVOT peak VTI 08/22/2022 12.16  cm Final    Posterior Wall 08/22/2022 1.28 (A)  0.6 - 1.1 cm Final    MV Peak A Teddy 08/22/2022 0.75  m/s Final    E wave deceleration time 08/22/2022 187.08  msec Final    MV Peak E Teddy 08/22/2022 0.59  m/s Final    RA Major Axis 08/22/2022 4.23  cm Final    RA Width 08/22/2022 3.98  cm Final    RVDD 08/22/2022 2.80  cm Final    TAPSE 08/22/2022 1.04  cm Final    TDI LATERAL 08/22/2022 0.04  m/s Final    TDI SEPTAL 08/22/2022 0.04  m/s Final    LA WIDTH 08/22/2022 4.10  cm Final    Ao root annulus 08/22/2022 4.38  cm Final    PV PEAK VELOCITY 08/22/2022 0.91  cm/s Final    MV stenosis pressure 1/2 time 08/22/2022 54.25  ms Final    LV Diastolic Volume 08/22/2022 154.99  mL Final    LV Systolic Volume 08/22/2022 110.27  mL Final    LVOT peak teddy 08/22/2022 0.66  m/s Final    LA volume (mod) 08/22/2022 53.82  cm3 Final    MV mean gradient 08/22/2022 1  mmHg Final    MV peak gradient 08/22/2022 2  mmHg Final    RV S' 08/22/2022 6.57  cm/s Final    MV VTI 08/22/2022 18.62  cm Final    LV LATERAL E/E' RATIO 08/22/2022 14.75  m/s Final    LV SEPTAL E/E' RATIO 08/22/2022 14.75  m/s Final    FS 08/22/2022 14  % Final    LA volume 08/22/2022 79.33  cm3 Final    LV mass 08/22/2022 353.12  g Final    Left Ventricle Relative Wall Thick* 08/22/2022 0.46  cm Final    AV valve area 08/22/2022 2.36  cm2 Final    AV Velocity Ratio 08/22/2022 0.57   Final    AV index (prosthetic) 08/22/2022 0.55   Final    MV valve area p 1/2 method 08/22/2022 4.06  cm2 Final    MV valve area by continuity eq 08/22/2022 2.81  cm2 Final    E/A ratio 08/22/2022 0.79   Final    Mean e' 08/22/2022 0.04  m/s Final    LVOT area 08/22/2022 4.3  cm2 Final    LVOT stroke volume 08/22/2022 52.27  cm3 Final    AV peak gradient 08/22/2022 5  mmHg Final    E/E' ratio 08/22/2022 14.75  m/s Final    BSA 08/22/2022 2.13  m2 Final    LV Systolic Volume Index 08/22/2022 53.3  mL/m2 Final    LV  Diastolic Volume Index 08/22/2022 74.87  mL/m2 Final    LA Volume Index 08/22/2022 38.3  mL/m2 Final    LV Mass Index 08/22/2022 171  g/m2 Final    LA Volume Index (Mod) 08/22/2022 26.0  mL/m2 Final    Right Atrial Pressure (from IVC) 08/22/2022 3  mmHg Final    EF 08/22/2022 30  % Final    TV rest pulmonary artery pressure 08/22/2022 28  mmHg Final    TR Max Teddy 08/22/2022 2.50  m/s Final    Triscuspid Valve Regurgitation Pea* 08/22/2022 25  mmHg Final   (  Ordering physician: Chiki Mcdaniel MD Study date: 8/22/22     Reason for Exam  Priority: Routine  Dx: Cardiomyopathy, unspecified type [I42.9 (ICD-10-CM)]; Chronic combined systolic and diastolic congestive heart failure [I50.42 (ICD-10-CM)]; Mixed hyperlipidemia [E78.2 (ICD-10-CM)]; Mitral valve insufficiency, unspecified etiology [I34.0 (ICD-10-CM)]; Nonspecific abnormal electrocardiogram (ECG) (EKG) [R94.31 (ICD-10-CM)]; JACLYN (obstructive sleep apnea) [G47.33 (ICD-10-CM)]     Result Image Hyperlink     Show images for Echo Saline Bubble? No  Summary    The left ventricle is moderately enlarged with mild concentric hypertrophy and severely decreased systolic function.  The estimated ejection fraction is 30%.  Grade I left ventricular diastolic dysfunction.  There is left ventricular global hypokinesis and focal mid inferior akinesis and focal anteroapical dyskinesis with an apparent small apical mural thrombus.  Mild right ventricular enlargement with low normal right ventricular systolic function.  Moderate left atrial enlargement.  Mild aortic regurgitation.  Mild-to-moderate mitral regurgitation.  Normal central venous pressure (3 mmHg).  The estimated PA systolic pressure is 28 mmHg.     ICD-10-CM)]; Nonspecific abnormal electrocardiogram (ECG) (EKG) [R94.31 (ICD-10-CM)]; JACLYN (obstructive sleep apnea) [G47.33 (ICD-10-CM)]     Conclusion         The EKG portion of this study is negative for ischemia.    The patient reported no chest pain during the  stress test.    There were no arrhythmias during stress.    The nuclear portion of this study will be reported separately.     Performing Clinician     seen)     Next appt: 08/30/2022 at 08:00 AM in Radiology ([facility] Department)     Dx: Solitary pulmonary nodule; Stage 3 ch...     0 Result Notes  Details    Reading Physician Reading Date Result Priority   Abimael Reed MD  [phone]  [phone] 8/27/2022 Routine     Narrative & Impression  EXAMINATION:  US RETROPERITONEAL COMPLETE     CLINICAL HISTORY:  Chronic kidney disease, stage 3 unspecified     TECHNIQUE:  Ultrasound of the kidneys and urinary bladder was performed including color flow and Doppler evaluation of the kidneys.     FINDINGS:  The right and left kidneys measure 10.2 and 11.5 cm respectively.  The right and left resistive indices measure 0.55 and 0.57 which is within normal limits.  There is a 1.5 cm cyst within the superior right kidney.  There is no hydronephrosis or nephrolithiasis.  The left kidney contains a 1.2 cm cyst.  The bladder is unremarkable.     Impression:     Small bilateral renal cysts.        Electronically signed by: Abimael Reed MD  Date:                                            08/27/2022  Time:                                           09:20           Exam Ended: 08/27/22 09:03 Last Resulted: 08/27/22 09:20    George as an Unsuccessful Attempt           Reading Physician Reading Date Result Priority   Darrel Carmona MD  [phone]  [phone] 8/22/2022 Routine   Stacy Tom MD  [phone]  [phone] 8/22/2022      Narrative & Impression  EXAMINATION:  NM MYOCARDIAL PERFUSION SPECT MULTI STUDY     CLINICAL HISTORY:  Heart failure, known or suspected, initial workup;abnormal ECG;  Cardiomyopathy, unspecified     TECHNIQUE:  SPECT images in short, vertical and horizontal long axis were acquired after the injection of 10.6 mCi of Tc-99m Myoview at rest and 10.6 mCi during a cardiac stress. The clinical  stress and ECG portion of the study is to be read separately.     CT was performed for attenuation correction.     COMPARISON:  Chest x-ray of today's date and myocardial perfusion 10/07/2016.     FINDINGS:  The quality of the study is good..     Stress SPECT images demonstrate diaphragmatic attenuation of the inferior wall which improves with attenuation correction.  On the resting images, there is matching inferior wall attenuation that improves with correction     The gated post-stress images reveal impaired wall motion and thickening with an estimated LVEF of 29 %. The LV cavity is dilated with an end-diastolic volume of 237 ml and an end-systolic volume of 170 ml.     CT is notable for an 8 mm left upper lobe nodule.     Impression:     1. No scintigraphic evidence of ischemia or scar..  2. The global left ventricular systolic function is diminished with an LV ejection fraction of 29 % and dilation of the left ventricle.  Wall motion is globally hypokinetic.  3. Incidental 8 mm left upper lobe nodule corresponding with finding on today's chest x-ray.  The lungs are incompletely imaged by attenuation correction CT, and further evaluation with dedicated chest CT is recommended in this former smoker.  This report was flagged in Epic as containing an incidental finding.     I, Darrel Carmona MD, attest that I reviewed and interpreted the images.     Electronically signed by resident: Stacy Tom  Date:                                            08/22/2022  Time:                                           16:06     Electronically signed by: Darrel Carmona  Date:                                            08/22/2022  Time:                                           17:49           Exam Ended: 08/22/22 09:28             Reading Physician Reading Date Result Priority   Esequiel Roldan MD  [phone]  [phone] 10/7/2016      Narrative & Impression  Time of Procedure: 10/07/16 07:11:23  Accession # [id number]     LEXISCAN  MIBI/TETROFOSMIN MYOCARDIAL PERFUSION SCAN     Indication:  Cardiomyopathy     Technique: SPECT images were acquired after the injection of 10 mCi of Tc-99m sestamibi at rest and 25 mCi during a Lexiscan injection. Heart rate changed from 102 to 134 bpm, and blood pressure changed from 97/65 to 147/73 mm/Hg.    During the stress procedure, shortness of breath was reported.  ECG portion of the stress is to be read seperately.     Findings:    The quality of the study is good VS is compromised by patient motion/GI activity adjacent to the inferior wall.      There is dilatation of the LV cavity (with a TID ratio of 1.1. Poor contractility seen at both rest and stress.  The gated post-stress images reveal impaired wall motion and diminished systolic wall thickening with an estimated LVEF of 16 %. The LV cavity is dilated with an end-diastolic volume of 336 ml and an end-systolic volume of 267 ml.  IMPRESSION:         *  Scintigraphically negative for ischemia and infarct.     *  The global left ventricular systolic function is decreased with an LV ejection fraction of 16 % and left ventricular dilatation. Wall motion is diminished.        Electronically signed by: MARTHA BESS MD  Date:                                            10/07/16  Time:                                           16:42              Exam Ended: 10/07/16 10:03 Last Resulted         And Lateral    Status: Final result         [email or web address] Results Release    [email or web address] Status: Active  Results Release           PACS Images for [employer] Viewer     Show images for X-Ray Chest PA And Lateral                All Reviewers List    Chiki Mcdaniel MD on 10/13/2016 10:14       X-Ray Chest PA And Lateral  Order: [id number]  Status: Final result    Visible to patient: Yes (not seen)    Next appt: None    Dx: Chronic combined systolic and diastol...    0 Result Notes    Details    Reading Physician Reading Date Result Priority   Angela Bergeron,  MD  [phone]  [phone] 10/7/2016      Narrative & Impression  2 views obtained.  No comparison.  The cardiac size is moderately enlarged.  There is no pleural effusion.  The osseous structures are intact.  There is no focal consolidation or significant increase in interstitial markings.  IMPRESSION:    Moderate cardiomegaly        Electronically signed by: Angela Bergeron MD  Date:                                            10/07/16  Time:                                           09:42        Assessment:     1. Cardiomyopathy, unspecified type    2. Chronic combined systolic and diastolic congestive heart failure    3. Mixed hyperlipidemia    4. Mitral valve insufficiency, unspecified etiology    5. Nonspecific abnormal electrocardiogram (ECG) (EKG)    6. Apical mural thrombus      Plan:   Fan was seen today for follow-up.    Diagnoses and all orders for this visit:    Cardiomyopathy, unspecified type  -     IN OFFICE EKG 12-LEAD (to [place])    Chronic combined systolic and diastolic congestive heart failure  -     IN OFFICE EKG 12-LEAD (to [facility])    Mixed hyperlipidemia  -     IN OFFICE EKG 12-LEAD (to [facility])    Mitral valve insufficiency, unspecified etiology  -     IN OFFICE EKG 12-LEAD (to [facility])    Nonspecific abnormal electrocardiogram (ECG) (EKG)  -     IN OFFICE EKG 12-LEAD (to [facility])    Apical mural thrombus   Pt states he only take medicines once a day.  Pt reports noncompliance with atorvastatin.    Discussed compliance with meds    Discussed indication for AICD.  Pt declines since the LVEF is improving    Will discontinue the Eliquis since there is no longer a mural thrombus    Begin ASA daily (or take Goody's)    Rest of meds the same    RTC 3 months    No follow-ups on file.

## 2022-12-19 ENCOUNTER — OFFICE VISIT (OUTPATIENT)
Dept: INTERNAL MEDICINE | Facility: CLINIC | Age: 62
End: 2022-12-19
Payer: COMMERCIAL

## 2022-12-19 VITALS
WEIGHT: 210.13 LBS | OXYGEN SATURATION: 95 % | SYSTOLIC BLOOD PRESSURE: 136 MMHG | BODY MASS INDEX: 31.12 KG/M2 | DIASTOLIC BLOOD PRESSURE: 88 MMHG | HEIGHT: 69 IN | HEART RATE: 67 BPM

## 2022-12-19 DIAGNOSIS — Z00.00 ANNUAL PHYSICAL EXAM: ICD-10-CM

## 2022-12-19 DIAGNOSIS — I50.42 CHRONIC COMBINED SYSTOLIC AND DIASTOLIC CONGESTIVE HEART FAILURE: ICD-10-CM

## 2022-12-19 DIAGNOSIS — N18.31 CHRONIC KIDNEY DISEASE, STAGE 3A: ICD-10-CM

## 2022-12-19 DIAGNOSIS — R09.81 NASAL CONGESTION: ICD-10-CM

## 2022-12-19 DIAGNOSIS — I51.3 APICAL MURAL THROMBUS: ICD-10-CM

## 2022-12-19 DIAGNOSIS — I10 ESSENTIAL HYPERTENSION: ICD-10-CM

## 2022-12-19 DIAGNOSIS — I42.9 CARDIOMYOPATHY, UNSPECIFIED TYPE: ICD-10-CM

## 2022-12-19 DIAGNOSIS — G47.33 OSA (OBSTRUCTIVE SLEEP APNEA): ICD-10-CM

## 2022-12-19 DIAGNOSIS — E78.2 MIXED HYPERLIPIDEMIA: ICD-10-CM

## 2022-12-19 DIAGNOSIS — Z12.5 PROSTATE CANCER SCREENING: ICD-10-CM

## 2022-12-19 PROCEDURE — 1159F MED LIST DOCD IN RCRD: CPT | Mod: CPTII,S$GLB,, | Performed by: INTERNAL MEDICINE

## 2022-12-19 PROCEDURE — 99396 PREV VISIT EST AGE 40-64: CPT | Mod: S$GLB,,, | Performed by: INTERNAL MEDICINE

## 2022-12-19 PROCEDURE — 3075F PR MOST RECENT SYSTOLIC BLOOD PRESS GE 130-139MM HG: ICD-10-PCS | Mod: CPTII,S$GLB,, | Performed by: INTERNAL MEDICINE

## 2022-12-19 PROCEDURE — 1160F RVW MEDS BY RX/DR IN RCRD: CPT | Mod: CPTII,S$GLB,, | Performed by: INTERNAL MEDICINE

## 2022-12-19 PROCEDURE — 99999 PR PBB SHADOW E&M-EST. PATIENT-LVL IV: CPT | Mod: PBBFAC,,, | Performed by: INTERNAL MEDICINE

## 2022-12-19 PROCEDURE — 3075F SYST BP GE 130 - 139MM HG: CPT | Mod: CPTII,S$GLB,, | Performed by: INTERNAL MEDICINE

## 2022-12-19 PROCEDURE — 1160F PR REVIEW ALL MEDS BY PRESCRIBER/CLIN PHARMACIST DOCUMENTED: ICD-10-PCS | Mod: CPTII,S$GLB,, | Performed by: INTERNAL MEDICINE

## 2022-12-19 PROCEDURE — 3008F PR BODY MASS INDEX (BMI) DOCUMENTED: ICD-10-PCS | Mod: CPTII,S$GLB,, | Performed by: INTERNAL MEDICINE

## 2022-12-19 PROCEDURE — 3008F BODY MASS INDEX DOCD: CPT | Mod: CPTII,S$GLB,, | Performed by: INTERNAL MEDICINE

## 2022-12-19 PROCEDURE — 1159F PR MEDICATION LIST DOCUMENTED IN MEDICAL RECORD: ICD-10-PCS | Mod: CPTII,S$GLB,, | Performed by: INTERNAL MEDICINE

## 2022-12-19 PROCEDURE — 3079F DIAST BP 80-89 MM HG: CPT | Mod: CPTII,S$GLB,, | Performed by: INTERNAL MEDICINE

## 2022-12-19 PROCEDURE — 4010F ACE/ARB THERAPY RXD/TAKEN: CPT | Mod: CPTII,S$GLB,, | Performed by: INTERNAL MEDICINE

## 2022-12-19 PROCEDURE — 3044F HG A1C LEVEL LT 7.0%: CPT | Mod: CPTII,S$GLB,, | Performed by: INTERNAL MEDICINE

## 2022-12-19 PROCEDURE — 4010F PR ACE/ARB THEARPY RXD/TAKEN: ICD-10-PCS | Mod: CPTII,S$GLB,, | Performed by: INTERNAL MEDICINE

## 2022-12-19 PROCEDURE — 3044F PR MOST RECENT HEMOGLOBIN A1C LEVEL <7.0%: ICD-10-PCS | Mod: CPTII,S$GLB,, | Performed by: INTERNAL MEDICINE

## 2022-12-19 PROCEDURE — 99396 PR PREVENTIVE VISIT,EST,40-64: ICD-10-PCS | Mod: S$GLB,,, | Performed by: INTERNAL MEDICINE

## 2022-12-19 PROCEDURE — 3079F PR MOST RECENT DIASTOLIC BLOOD PRESSURE 80-89 MM HG: ICD-10-PCS | Mod: CPTII,S$GLB,, | Performed by: INTERNAL MEDICINE

## 2022-12-19 PROCEDURE — 99999 PR PBB SHADOW E&M-EST. PATIENT-LVL IV: ICD-10-PCS | Mod: PBBFAC,,, | Performed by: INTERNAL MEDICINE

## 2022-12-19 PROCEDURE — 3066F NEPHROPATHY DOC TX: CPT | Mod: CPTII,S$GLB,, | Performed by: INTERNAL MEDICINE

## 2022-12-19 PROCEDURE — 3066F PR DOCUMENTATION OF TREATMENT FOR NEPHROPATHY: ICD-10-PCS | Mod: CPTII,S$GLB,, | Performed by: INTERNAL MEDICINE

## 2022-12-19 RX ORDER — VALSARTAN 40 MG/1
40 TABLET ORAL
COMMUNITY
Start: 2022-11-09 | End: 2023-07-06

## 2022-12-19 NOTE — PROGRESS NOTES
Patient ID: Fan Chris is a 62 y.o. male.    Chief Complaint: Annual Exam    HPI Fan is a 62 y.o. male with hyperlipidemia, hypertension, obstructive sleep apnea, obesity who presents for annual exam. Since his last visit with me in May 2021, he has seen cardiology for chief complaint of shortness of breath and was found to have chronic diastolic and systolic CHF, cardiomyopathy and an apical thrombus. He has also seen nephrology for acute renal failure and chronic kidney disease stage 3. We reviewed lab results from 11/23/22.   He complains of nasal congestion. He does not like to use medication such as oral antihistamines or nasal sprays. No further acute complaints.     Health Maintenance Topics with due status: Not Due       Topic Last Completion Date    Colorectal Cancer Screening 06/07/2016    TETANUS VACCINE 02/26/2021    Lipid Panel 11/23/2022    High Dose Statin 12/19/2022      Review of Systems   All other systems reviewed and are negative.      Objective:     Vitals:    12/19/22 1416   BP: 136/88   Pulse: 67        Physical Exam  Vitals reviewed.   Constitutional:       General: He is not in acute distress.     Appearance: Normal appearance. He is well-developed. He is obese. He is not ill-appearing, toxic-appearing or diaphoretic.   HENT:      Head: Normocephalic and atraumatic.      Right Ear: Tympanic membrane, ear canal and external ear normal. There is no impacted cerumen.      Left Ear: Tympanic membrane, ear canal and external ear normal. There is no impacted cerumen.      Nose: Nose normal.   Eyes:      General:         Right eye: No discharge.         Left eye: No discharge.      Extraocular Movements: Extraocular movements intact.      Conjunctiva/sclera: Conjunctivae normal.   Neck:      Thyroid: No thyromegaly.      Trachea: No tracheal deviation.   Cardiovascular:      Rate and Rhythm: Normal rate and regular rhythm.      Pulses: Normal pulses.      Heart sounds: Normal heart sounds. No  murmur heard.    No friction rub. No gallop.   Pulmonary:      Effort: Pulmonary effort is normal. No respiratory distress.      Breath sounds: Normal breath sounds. No stridor. No wheezing, rhonchi or rales.   Chest:      Chest wall: No tenderness.   Abdominal:      General: Bowel sounds are normal. There is no distension.      Palpations: Abdomen is soft. There is no mass.      Tenderness: There is no abdominal tenderness. There is no guarding or rebound.      Hernia: No hernia is present.   Musculoskeletal:         General: No tenderness or deformity.      Cervical back: Neck supple.      Right lower leg: No edema.      Left lower leg: No edema.   Lymphadenopathy:      Cervical: No cervical adenopathy.   Skin:     General: Skin is warm and dry.      Findings: No erythema or rash.   Neurological:      General: No focal deficit present.      Mental Status: He is alert and oriented to person, place, and time. Mental status is at baseline.      Cranial Nerves: No cranial nerve deficit.   Psychiatric:         Mood and Affect: Mood normal.         Behavior: Behavior normal.         Thought Content: Thought content normal.         Judgment: Judgment normal.       Assessment:       1. Annual physical exam    2. Prostate cancer screening    3. Essential hypertension Well controlled   4. Cardiomyopathy, unspecified type Chronic   5. Chronic combined systolic and diastolic congestive heart failure Chronic   6. Mixed hyperlipidemia Chronic   7. JACLYN (obstructive sleep apnea) Chronic   8. Chronic kidney disease, stage 3a Chronic   9. Apical mural thrombus Chronic   10. Nasal congestion Chronic         Plan:         Annual physical exam    Prostate cancer screening  -     PSA, Screening; Future; Expected date: 06/19/2023    Essential hypertension  Comments:  cont current medication  Orders:  -     Comprehensive Metabolic Panel; Future; Expected date: 06/19/2023    Cardiomyopathy, unspecified type  Comments:  cont current  medication. Managed by cardiology    Chronic combined systolic and diastolic congestive heart failure  Comments:  cont current medication. Managed by cardiology    Mixed hyperlipidemia  Comments:  cont current medication. Managed by cardiology    JACLYN (obstructive sleep apnea)  Comments:  Managed by sleep medicine    Chronic kidney disease, stage 3a  Comments:  Suspect due to cardiac medications and/or HTN. Cont to monitor    Apical mural thrombus  Comments:  Managed by cardiology. On aspirin    Nasal congestion  Comments:  Use OTC nasal saline rinses like neti pot or carter med sinus rinse         RTC 6 months     Warning signs discussed, patient to call with any further issues or worsening of symptoms.       Parts of the above note were dictated using a voice dictation software. Please excuse any grammatical or typographical errors.

## 2023-02-28 ENCOUNTER — HOSPITAL ENCOUNTER (OUTPATIENT)
Dept: CARDIOLOGY | Facility: HOSPITAL | Age: 63
Discharge: HOME | End: 2023-02-28
Attending: INTERNAL MEDICINE
Payer: COMMERCIAL

## 2023-02-28 VITALS — HEIGHT: 69 IN | WEIGHT: 210 LBS | BODY MASS INDEX: 31.1 KG/M2

## 2023-02-28 DIAGNOSIS — I34.0 MITRAL VALVE INSUFFICIENCY, UNSPECIFIED ETIOLOGY: ICD-10-CM

## 2023-02-28 DIAGNOSIS — I51.3 APICAL MURAL THROMBUS: ICD-10-CM

## 2023-02-28 DIAGNOSIS — I50.42 CHRONIC COMBINED SYSTOLIC AND DIASTOLIC CONGESTIVE HEART FAILURE: ICD-10-CM

## 2023-02-28 DIAGNOSIS — E78.2 MIXED HYPERLIPIDEMIA: ICD-10-CM

## 2023-02-28 DIAGNOSIS — R94.31 NONSPECIFIC ABNORMAL ELECTROCARDIOGRAM (ECG) (EKG): ICD-10-CM

## 2023-02-28 DIAGNOSIS — I42.9 CARDIOMYOPATHY, UNSPECIFIED TYPE: ICD-10-CM

## 2023-02-28 LAB
ASCENDING AORTA: 3.67 CM
AV INDEX (PROSTH): 0.64
AV MEAN GRADIENT: 4 MMHG
AV PEAK GRADIENT: 5 MMHG
AV REGURGITATION PRESSURE HALF TIME: 577.67 MS
AV VALVE AREA: 2.92 CM2
AV VELOCITY RATIO: 0.61
BSA FOR ECHO PROCEDURE: 2.15 M2
CV ECHO LV RWT: 0.56 CM
DOP CALC AO PEAK VEL: 1.11 M/S
DOP CALC AO VTI: 19.8 CM
DOP CALC LVOT AREA: 4.6 CM2
DOP CALC LVOT DIAMETER: 2.41 CM
DOP CALC LVOT PEAK VEL: 0.68 M/S
DOP CALC LVOT STROKE VOLUME: 57.9 CM3
DOP CALC MV VTI: 15.4 CM
DOP CALCLVOT PEAK VEL VTI: 12.7 CM
E WAVE DECELERATION TIME: 175.82 MSEC
E/A RATIO: 0.6
E/E' RATIO: 14.67 M/S
ECHO LV POSTERIOR WALL: 1.49 CM (ref 0.6–1.1)
EJECTION FRACTION: 40 %
FRACTIONAL SHORTENING: 19 % (ref 28–44)
INTERVENTRICULAR SEPTUM: 1.62 CM (ref 0.6–1.1)
IVC DIAMETER: 10 CM
LA MAJOR: 4.1 CM
LA MINOR: 4.89 CM
LA WIDTH: 3.7 CM
LEFT ATRIUM SIZE: 4 CM
LEFT ATRIUM VOLUME INDEX MOD: 26.8 ML/M2
LEFT ATRIUM VOLUME INDEX: 26.6 ML/M2
LEFT ATRIUM VOLUME MOD: 56.65 CM3
LEFT ATRIUM VOLUME: 56.11 CM3
LEFT INTERNAL DIMENSION IN SYSTOLE: 4.3 CM (ref 2.1–4)
LEFT VENTRICLE DIASTOLIC VOLUME INDEX: 63.82 ML/M2
LEFT VENTRICLE DIASTOLIC VOLUME: 134.67 ML
LEFT VENTRICLE MASS INDEX: 176 G/M2
LEFT VENTRICLE SYSTOLIC VOLUME INDEX: 39.3 ML/M2
LEFT VENTRICLE SYSTOLIC VOLUME: 82.9 ML
LEFT VENTRICULAR INTERNAL DIMENSION IN DIASTOLE: 5.29 CM (ref 3.5–6)
LEFT VENTRICULAR MASS: 370.56 G
LV LATERAL E/E' RATIO: 22 M/S
LV SEPTAL E/E' RATIO: 11 M/S
LVOT MG: 1.16 MMHG
LVOT MV: 0.52 CM/S
MV A" WAVE DURATION": 128.45 MSEC
MV MEAN GRADIENT: 1 MMHG
MV PEAK A VEL: 0.73 M/S
MV PEAK E VEL: 0.44 M/S
MV PEAK GRADIENT: 2 MMHG
MV STENOSIS PRESSURE HALF TIME: 50.99 MS
MV VALVE AREA BY CONTINUITY EQUATION: 3.76 CM2
MV VALVE AREA P 1/2 METHOD: 4.31 CM2
PISA AR MAX VEL: 3.35 M/S
PISA MRMAX VEL: 5.39 M/S
PISA TR MAX VEL: 1.86 M/S
PULM VEIN S/D RATIO: 1.06
PV MV: 0.78 M/S
PV PEAK D VEL: 0.34 M/S
PV PEAK S VEL: 0.36 M/S
PV PEAK VELOCITY: 1 CM/S
RA MAJOR: 4.42 CM
RA PRESSURE: 3 MMHG
RA WIDTH: 4.2 CM
RIGHT VENTRICULAR END-DIASTOLIC DIMENSION: 2.6 CM
RV TISSUE DOPPLER FREE WALL SYSTOLIC VELOCITY 1 (APICAL 4 CHAMBER VIEW): 0.01 CM/S
STJ: 3.08 CM
TDI LATERAL: 0.02 M/S
TDI SEPTAL: 0.04 M/S
TDI: 0.03 M/S
TR MAX PG: 14 MMHG
TV REST PULMONARY ARTERY PRESSURE: 17 MMHG

## 2023-02-28 PROCEDURE — 93306 ECHO (CUPID ONLY): ICD-10-PCS | Mod: 26,,, | Performed by: INTERNAL MEDICINE

## 2023-02-28 PROCEDURE — 93306 TTE W/DOPPLER COMPLETE: CPT | Mod: 26,,, | Performed by: INTERNAL MEDICINE

## 2023-02-28 PROCEDURE — 93306 TTE W/DOPPLER COMPLETE: CPT

## 2023-03-07 ENCOUNTER — OFFICE VISIT (OUTPATIENT)
Dept: CARDIOLOGY | Facility: CLINIC | Age: 63
End: 2023-03-07
Payer: COMMERCIAL

## 2023-03-07 VITALS
WEIGHT: 212.06 LBS | HEART RATE: 79 BPM | DIASTOLIC BLOOD PRESSURE: 76 MMHG | SYSTOLIC BLOOD PRESSURE: 109 MMHG | BODY MASS INDEX: 31.41 KG/M2 | HEIGHT: 69 IN

## 2023-03-07 DIAGNOSIS — I34.0 NONRHEUMATIC MITRAL VALVE REGURGITATION: ICD-10-CM

## 2023-03-07 DIAGNOSIS — N18.31 CHRONIC KIDNEY DISEASE, STAGE 3A: ICD-10-CM

## 2023-03-07 DIAGNOSIS — E78.2 MIXED HYPERLIPIDEMIA: ICD-10-CM

## 2023-03-07 DIAGNOSIS — I50.42 CHRONIC COMBINED SYSTOLIC AND DIASTOLIC CONGESTIVE HEART FAILURE: ICD-10-CM

## 2023-03-07 DIAGNOSIS — I42.0 DILATED CARDIOMYOPATHY: Primary | ICD-10-CM

## 2023-03-07 PROCEDURE — 3074F PR MOST RECENT SYSTOLIC BLOOD PRESSURE < 130 MM HG: ICD-10-PCS | Mod: CPTII,S$GLB,, | Performed by: INTERNAL MEDICINE

## 2023-03-07 PROCEDURE — 3078F PR MOST RECENT DIASTOLIC BLOOD PRESSURE < 80 MM HG: ICD-10-PCS | Mod: CPTII,S$GLB,, | Performed by: INTERNAL MEDICINE

## 2023-03-07 PROCEDURE — 93000 EKG 12-LEAD: ICD-10-PCS | Mod: S$GLB,,, | Performed by: INTERNAL MEDICINE

## 2023-03-07 PROCEDURE — 3078F DIAST BP <80 MM HG: CPT | Mod: CPTII,S$GLB,, | Performed by: INTERNAL MEDICINE

## 2023-03-07 PROCEDURE — 99214 PR OFFICE/OUTPT VISIT, EST, LEVL IV, 30-39 MIN: ICD-10-PCS | Mod: 25,S$GLB,, | Performed by: INTERNAL MEDICINE

## 2023-03-07 PROCEDURE — 1160F RVW MEDS BY RX/DR IN RCRD: CPT | Mod: CPTII,S$GLB,, | Performed by: INTERNAL MEDICINE

## 2023-03-07 PROCEDURE — 99999 PR PBB SHADOW E&M-EST. PATIENT-LVL III: ICD-10-PCS | Mod: PBBFAC,,, | Performed by: INTERNAL MEDICINE

## 2023-03-07 PROCEDURE — 3008F BODY MASS INDEX DOCD: CPT | Mod: CPTII,S$GLB,, | Performed by: INTERNAL MEDICINE

## 2023-03-07 PROCEDURE — 1160F PR REVIEW ALL MEDS BY PRESCRIBER/CLIN PHARMACIST DOCUMENTED: ICD-10-PCS | Mod: CPTII,S$GLB,, | Performed by: INTERNAL MEDICINE

## 2023-03-07 PROCEDURE — 99999 PR PBB SHADOW E&M-EST. PATIENT-LVL III: CPT | Mod: PBBFAC,,, | Performed by: INTERNAL MEDICINE

## 2023-03-07 PROCEDURE — 3074F SYST BP LT 130 MM HG: CPT | Mod: CPTII,S$GLB,, | Performed by: INTERNAL MEDICINE

## 2023-03-07 PROCEDURE — 1159F MED LIST DOCD IN RCRD: CPT | Mod: CPTII,S$GLB,, | Performed by: INTERNAL MEDICINE

## 2023-03-07 PROCEDURE — 3008F PR BODY MASS INDEX (BMI) DOCUMENTED: ICD-10-PCS | Mod: CPTII,S$GLB,, | Performed by: INTERNAL MEDICINE

## 2023-03-07 PROCEDURE — 99214 OFFICE O/P EST MOD 30 MIN: CPT | Mod: 25,S$GLB,, | Performed by: INTERNAL MEDICINE

## 2023-03-07 PROCEDURE — 1159F PR MEDICATION LIST DOCUMENTED IN MEDICAL RECORD: ICD-10-PCS | Mod: CPTII,S$GLB,, | Performed by: INTERNAL MEDICINE

## 2023-03-07 PROCEDURE — 93000 ELECTROCARDIOGRAM COMPLETE: CPT | Mod: S$GLB,,, | Performed by: INTERNAL MEDICINE

## 2023-03-07 NOTE — PROGRESS NOTES
Subjective:      Patient ID: Fan Chris is a 63 y.o. male.    Chief Complaint: Follow-up    HPI:  Pt feels well    Pt exercises vigorously several days a week.    Pt only has mild shortness of breath with extremely strenuous exertion    Review of Systems   Cardiovascular:  Positive for dyspnea on exertion. Negative for chest pain, claudication, irregular heartbeat, leg swelling, near-syncope, orthopnea, palpitations and syncope.      Past Medical History:   Diagnosis Date    Asthma     Bilateral renal cysts     Bronchitis     Cardiomyopathy     CHF (congestive heart failure)     CKD (chronic kidney disease) stage 3, GFR 30-59 ml/min     Hyperlipidemia 10/14/2016    Hypertension     Sinusitis     Sleep apnea         Past Surgical History:   Procedure Laterality Date    arm fracture      EYE SURGERY      TONSILLECTOMY         Family History   Problem Relation Age of Onset    Lung cancer Mother     Cancer Mother 61        lung    Hypertension Father     Hyperlipidemia Father     Heart disease Paternal Grandmother        Social History     Socioeconomic History    Marital status: Single   Tobacco Use    Smoking status: Former     Packs/day: 1.00     Years: 8.00     Pack years: 8.00     Types: Cigarettes     Quit date: 10/4/1984     Years since quittin.4    Smokeless tobacco: Never   Substance and Sexual Activity    Alcohol use: Not Currently     Alcohol/week: 1.0 standard drink     Types: 1 Cans of beer per week    Drug use: No    Sexual activity: Never     Partners: Female   Social History Narrative    Lives alone with his dog. Generally eats outside and indulges in sweets and eats large portions of food. Drinks juice and sweet tea. Drinks 4-5 glasses of water.      Social Determinants of Health     Financial Resource Strain: Low Risk     Difficulty of Paying Living Expenses: Not hard at all   Food Insecurity: No Food Insecurity    Worried About Running Out of Food in the Last Year: Never true    Ran Out of  Food in the Last Year: Never true   Transportation Needs: No Transportation Needs    Lack of Transportation (Medical): No    Lack of Transportation (Non-Medical): No   Physical Activity: Sufficiently Active    Days of Exercise per Week: 4 days    Minutes of Exercise per Session: 50 min   Stress: No Stress Concern Present    Feeling of Stress : Not at all   Social Connections: Unknown    Frequency of Communication with Friends and Family: More than three times a week    Frequency of Social Gatherings with Friends and Family: More than three times a week    Active Member of Clubs or Organizations: Yes    Attends Club or Organization Meetings: More than 4 times per year    Marital Status: Never    Housing Stability: Low Risk     Unable to Pay for Housing in the Last Year: No    Number of Places Lived in the Last Year: 2    Unstable Housing in the Last Year: No       Current Outpatient Medications on File Prior to Visit   Medication Sig Dispense Refill    aspirin/salicylamide/caffeine (BC HEADACHE POWDER ORAL) Take by mouth Daily.      carvediloL (COREG) 6.25 MG tablet Take 1 tablet (6.25 mg total) by mouth every 12 (twelve) hours. 60 tablet 11    fluticasone propionate (FLONASE) 50 mcg/actuation nasal spray 2 sprays by Each Nostril route as needed.      omega-3 fatty acids (FISH OIL CONCENTRATE ORAL) Take by mouth.      sacubitriL-valsartan (ENTRESTO) 49-51 mg per tablet Take 1 tablet by mouth 2 (two) times daily. 180 tablet 3    TESTOSTERONE CYPIONATE IM Inject into the muscle. Twice weekly      valsartan (DIOVAN) 40 MG tablet Take 40 mg by mouth.      aspirin (ECOTRIN) 81 MG EC tablet Take 1 tablet (81 mg total) by mouth once daily. (Patient not taking: Reported on 3/7/2023) 30 tablet 11    atorvastatin (LIPITOR) 40 MG tablet Take 1 tablet (40 mg total) by mouth once daily. (Patient not taking: Reported on 3/7/2023) 90 tablet 3    furosemide (LASIX) 20 MG tablet Take 1 tablet (20 mg total) by mouth once daily.  "As needed for shortness of breath (Patient not taking: Reported on 12/19/2022) 30 tablet 11     No current facility-administered medications on file prior to visit.       Review of patient's allergies indicates:  No Known Allergies  Objective:     Vitals:    03/07/23 1156   BP: 109/76   BP Location: Left arm   Patient Position: Sitting   BP Method: Large (Automatic)   Pulse: 79   Weight: 96.2 kg (212 lb 1.3 oz)   Height: 5' 9" (1.753 m)        Physical Exam  Vitals reviewed.   Constitutional:       General: He is not in acute distress.     Appearance: He is well-developed. He is not diaphoretic.   Eyes:      General: No scleral icterus.  Neck:      Vascular: No carotid bruit or JVD.   Cardiovascular:      Rate and Rhythm: Regular rhythm.      Heart sounds: Normal heart sounds. No murmur heard.    No friction rub. No gallop.   Pulmonary:      Effort: Pulmonary effort is normal. No respiratory distress.      Breath sounds: Normal breath sounds.   Musculoskeletal:      Right lower leg: No edema.      Left lower leg: No edema.   Skin:     General: Skin is warm and dry.   Neurological:      Mental Status: He is alert and oriented to person, place, and time.   Psychiatric:         Behavior: Behavior normal.         Thought Content: Thought content normal.         Judgment: Judgment normal.          Hospital Outpatient Visit on 02/28/2023   Component Date Value Ref Range Status    BSA 02/28/2023 2.15  m2 Final    TDI SEPTAL 02/28/2023 0.04  m/s Final    LV LATERAL E/E' RATIO 02/28/2023 22.00  m/s Final    LV SEPTAL E/E' RATIO 02/28/2023 11.00  m/s Final    LA WIDTH 02/28/2023 3.70  cm Final    IVC diameter 02/28/2023 10  cm Final    Left Ventricular Outflow Tract Amy* 02/28/2023 0.52  cm/s Final    Left Ventricular Outflow Tract Amy* 02/28/2023 1.16  mmHg Final    Pulmonary Valve Mean Velocity 02/28/2023 0.78  m/s Final    TDI LATERAL 02/28/2023 0.02  m/s Final    PV PEAK VELOCITY 02/28/2023 1.00  cm/s Final    LVIDd " "02/28/2023 5.29  3.5 - 6.0 cm Final    IVS 02/28/2023 1.62 (A)  0.6 - 1.1 cm Final    Posterior Wall 02/28/2023 1.49 (A)  0.6 - 1.1 cm Final    LVIDs 02/28/2023 4.30 (A)  2.1 - 4.0 cm Final    FS 02/28/2023 19  28 - 44 % Final    LA volume 02/28/2023 56.11  cm3 Final    STJ 02/28/2023 3.08  cm Final    Ascending aorta 02/28/2023 3.67  cm Final    LV mass 02/28/2023 370.56  g Final    LA size 02/28/2023 4.00  cm Final    RVDD 02/28/2023 2.60  cm Final    RV S' 02/28/2023 0.01  cm/s Final    Left Ventricle Relative Wall Thick* 02/28/2023 0.56  cm Final    AV regurgitation pressure 1/2 time 02/28/2023 577.797603631247831  ms Final    AV mean gradient 02/28/2023 4  mmHg Final    AV valve area 02/28/2023 2.92  cm2 Final    AV Velocity Ratio 02/28/2023 0.61   Final    AV index (prosthetic) 02/28/2023 0.64   Final    MV mean gradient 02/28/2023 1  mmHg Final    MV valve area p 1/2 method 02/28/2023 4.31  cm2 Final    MV valve area by continuity eq 02/28/2023 3.76  cm2 Final    E/A ratio 02/28/2023 0.60   Final    Mean e' 02/28/2023 0.03  m/s Final    E wave deceleration time 02/28/2023 175.82  msec Final    MV "A" wave duration 02/28/2023 [id number]  msec Final    Pulm vein S/D ratio 02/28/2023 1.06   Final    LVOT diameter 02/28/2023 2.41  cm Final    LVOT area 02/28/2023 4.6  cm2 Final    LVOT peak teddy 02/28/2023 0.68  m/s Final    LVOT peak VTI 02/28/2023 12.70  cm Final    Ao peak teddy 02/28/2023 1.11  m/s Final    Ao VTI 02/28/2023 19.8  cm Final    Mr max teddy 02/28/2023 5.39  m/s Final    LVOT stroke volume 02/28/2023 57.90  cm3 Final    AV peak gradient 02/28/2023 5  mmHg Final    MV peak gradient 02/28/2023 2  mmHg Final    E/E' ratio 02/28/2023 14.67  m/s Final    MV Peak E Teddy 02/28/2023 0.44  m/s Final    AR Max Teddy 02/28/2023 3.35  m/s Final    TR Max Teddy 02/28/2023 1.86  m/s Final    MV VTI 02/28/2023 15.4  cm Final    MV stenosis pressure 1/2 time 02/28/2023 50.99  ms Final    MV Peak A Teddy 02/28/2023 " 0.73  m/s Final    PV Peak S Teddy 02/28/2023 0.36  m/s Final    PV Peak D Teddy 02/28/2023 0.34  m/s Final    LV Systolic Volume 02/28/2023 82.90  mL Final    LV Systolic Volume Index 02/28/2023 39.3  mL/m2 Final    LV Diastolic Volume 02/28/2023 134.67  mL Final    LV Diastolic Volume Index 02/28/2023 63.82  mL/m2 Final    LA Volume Index 02/28/2023 26.6  mL/m2 Final    LV Mass Index 02/28/2023 176  g/m2 Final    RA Major Axis 02/28/2023 4.42  cm Final    Left Atrium Minor Axis 02/28/2023 4.89  cm Final    Left Atrium Major Axis 02/28/2023 4.10  cm Final    Triscuspid Valve Regurgitation Pea* 02/28/2023 14  mmHg Final    LA Volume Index (Mod) 02/28/2023 26.8  mL/m2 Final    LA volume (mod) 02/28/2023 56.65  cm3 Final    RA Width 02/28/2023 4.20  cm Final    Right Atrial Pressure (from IVC) 02/28/2023 3  mmHg Final    EF 02/28/2023 40  % Final    TV rest pulmonary artery pressure 02/28/2023 17  mmHg Final   Lab Visit on 02/28/2023   Component Date Value Ref Range Status    WBC 02/28/2023 5.16  3.90 - 12.70 K/uL Final    RBC 02/28/2023 5.89  4.60 - 6.20 M/uL Final    Hemoglobin 02/28/2023 17.2  14.0 - 18.0 g/dL Final    Hematocrit 02/28/2023 51.5  40.0 - 54.0 % Final    MCV 02/28/2023 87  82 - 98 fL Final    MCH 02/28/2023 29.2  27.0 - 31.0 pg Final    MCHC 02/28/2023 33.4  32.0 - 36.0 g/dL Final    RDW 02/28/2023 13.4  11.5 - 14.5 % Final    Platelets 02/28/2023 430  150 - 450 K/uL Final    MPV 02/28/2023 8.3 (L)  9.2 - 12.9 fL Final    Immature Granulocytes 02/28/2023 0.4  0.0 - 0.5 % Final    Gran # (ANC) 02/28/2023 3.0  1.8 - 7.7 K/uL Final    Immature Grans (Abs) 02/28/2023 0.02  0.00 - 0.04 K/uL Final    Lymph # 02/28/2023 1.3  1.0 - 4.8 K/uL Final    Mono # 02/28/2023 0.5  0.3 - 1.0 K/uL Final    Eos # 02/28/2023 0.3  0.0 - 0.5 K/uL Final    Baso # 02/28/2023 0.06  0.00 - 0.20 K/uL Final    nRBC 02/28/2023 0  0 /100 WBC Final    Gran % 02/28/2023 57.3  38.0 - 73.0 % Final    Lymph % 02/28/2023 26.0  18.0 - 48.0 %  Final    Mono % 02/28/2023 9.9  4.0 - 15.0 % Final    Eosinophil % 02/28/2023 5.2  0.0 - 8.0 % Final    Basophil % 02/28/2023 1.2  0.0 - 1.9 % Final    Differential Method 02/28/2023 Automated   Final    Sodium 02/28/2023 138  136 - 145 mmol/L Final    Potassium 02/28/2023 4.5  3.5 - 5.1 mmol/L Final    Chloride 02/28/2023 101  95 - 110 mmol/L Final    CO2 02/28/2023 30 (H)  23 - 29 mmol/L Final    Glucose 02/28/2023 111 (H)  70 - 110 mg/dL Final    BUN 02/28/2023 17  8 - 23 mg/dL Final    Creatinine 02/28/2023 1.6 (H)  0.5 - 1.4 mg/dL Final    Calcium 02/28/2023 9.1  8.7 - 10.5 mg/dL Final    Total Protein 02/28/2023 7.1  6.0 - 8.4 g/dL Final    Albumin 02/28/2023 4.1  3.5 - 5.2 g/dL Final    Total Bilirubin 02/28/2023 1.1 (H)  0.1 - 1.0 mg/dL Final    Alkaline Phosphatase 02/28/2023 58  55 - 135 U/L Final    AST 02/28/2023 21  10 - 40 U/L Final    ALT 02/28/2023 22  10 - 44 U/L Final    Anion Gap 02/28/2023 7 (L)  8 - 16 mmol/L Final    eGFR 02/28/2023 49 (A)  >60 mL/min/1.73 m^2 Final    Cholesterol 02/28/2023 271 (H)  120 - 199 mg/dL Final    Triglycerides 02/28/2023 124  30 - 150 mg/dL Final    HDL 02/28/2023 34 (L)  40 - 75 mg/dL Final    LDL Cholesterol 02/28/2023 212.2 (H)  63.0 - 159.0 mg/dL Final    HDL/Cholesterol Ratio 02/28/2023 12.5 (L)  20.0 - 50.0 % Final    Total Cholesterol/HDL Ratio 02/28/2023 8.0 (H)  2.0 - 5.0 Final    Non-HDL Cholesterol 02/28/2023 237  mg/dL Final    TSH 02/28/2023 1.696  0.400 - 4.000 uIU/mL Final   Hospital Outpatient Visit on 11/23/2022   Component Date Value Ref Range Status    Ascending aorta 11/23/2022 3.28  cm Final    STJ 11/23/2022 3.00  cm Final    AV mean gradient 11/23/2022 3  mmHg Final    Ao peak sonia 11/23/2022 1.13  m/s Final    Ao VTI 11/23/2022 20.1  cm Final    IVRT 11/23/2022 139.87  msec Final    IVS 11/23/2022 1.60 (A)  0.6 - 1.1 cm Final    LA size 11/23/2022 4.14  cm Final    Left Atrium Major Axis 11/23/2022 5.02  cm Final    Left Atrium Minor Axis  11/23/2022 5.16  cm Final    LVIDd 11/23/2022 5.42  3.5 - 6.0 cm Final    LVIDs 11/23/2022 9.00 (A)  2.1 - 4.0 cm Final    LVOT diameter 11/23/2022 2.43  cm Final    LVOT peak VTI 11/23/2022 9.90  cm Final    Posterior Wall 11/23/2022 1.60  0.6 - 1.1 cm Final    MV Peak A Teddy 11/23/2022 0.70  m/s Final    E wave deceleration time 11/23/2022 291.35  msec Final    MV Peak E Teddy 11/23/2022 0.37  m/s Final    RA Major [place] 11/23/2022 4.02  cm Final    RA Width 11/23/2022 3.65  cm Final    TAPSE 11/23/2022 1.29  cm Final    TR Max Teddy 11/23/2022 1.72  m/s Final    LA WIDTH 11/23/2022 3.60  cm Final    MV stenosis pressure 1/2 time 11/23/2022 84.49  ms Final    LV Diastolic Volume 11/23/2022 142.29  mL Final    LV Systolic Volume 11/23/2022 90.00  mL Final    LVOT peak teddy 11/23/2022 0.54  m/s Final    TDI LATERAL 11/23/2022 0.03  m/s Final    TDI SEPTAL 11/23/2022 0.05  m/s Final    LA volume (mod) 11/23/2022 50.31  cm3 Final    RV S' 11/23/2022 0.01  cm/s Final    LV LATERAL E/E' RATIO 11/23/2022 12.33  m/s Final    LV SEPTAL E/E' RATIO 11/23/2022 7.40  m/s Final    FS 11/23/2022 -66  % Final    LA volume 11/23/2022 64.47  cm3 Final    LV mass 11/23/2022 401.03  g Final    Left Ventricle Relative Wall Thick* 11/23/2022 0.59  cm Final    AV valve area 11/23/2022 2.28  cm2 Final    AV Velocity Ratio 11/23/2022 0.48   Final    AV index (prosthetic) 11/23/2022 0.49   Final    MV valve area p 1/2 method 11/23/2022 2.60  cm2 Final    E/A ratio 11/23/2022 0.53   Final    Mean e' 11/23/2022 0.04  m/s Final    LVOT area 11/23/2022 4.6  cm2 Final    LVOT stroke volume 11/23/2022 45.89  cm3 Final    AV peak gradient 11/23/2022 5  mmHg Final    E/E' ratio 11/23/2022 9.25  m/s Final    Triscuspid Valve Regurgitation Pea* 11/23/2022 12  mmHg Final    BSA 11/23/2022 2.13  m2 Final    LV Systolic Volume Index 11/23/2022 43.1  mL/m2 Final    LV Diastolic Volume Index 11/23/2022 68.08  mL/m2 Final    LA Volume Index 11/23/2022 30.8   mL/m2 Final    LV Mass Index 11/23/2022 192  g/m2 Final    LA Volume Index (Mod) 11/23/2022 24.1  mL/m2 Final    Left Ventricular Outflow Tract Amy* 11/23/2022 0.47  cm/s Final    Left Ventricular Outflow Tract Amy* 11/23/2022 0.91  mmHg Final    AV regurgitation pressure 1/2 time 11/23/2022 2,646.749400515989550  ms Final    AR Max Teddy 11/23/2022 3.00  m/s Final    Right Atrial Pressure (from IVC) 11/23/2022 3  mmHg Final    EF 11/23/2022 35  % Final    RVDD 11/23/2022 2.80  cm Final    TV rest pulmonary artery pressure 11/23/2022 15  mmHg Final   Lab Visit on 11/23/2022   Component Date Value Ref Range Status    WBC 11/23/2022 4.18  3.90 - 12.70 K/uL Final    RBC 11/23/2022 5.94  4.60 - 6.20 M/uL Final    Hemoglobin 11/23/2022 17.4  14.0 - 18.0 g/dL Final    Hematocrit 11/23/2022 52.1  40.0 - 54.0 % Final    MCV 11/23/2022 88  82 - 98 fL Final    MCH 11/23/2022 29.3  27.0 - 31.0 pg Final    MCHC 11/23/2022 33.4  32.0 - 36.0 g/dL Final    RDW 11/23/2022 17.0 (H)  11.5 - 14.5 % Final    Platelets 11/23/2022 374  150 - 450 K/uL Final    MPV 11/23/2022 8.2 (L)  9.2 - 12.9 fL Final    Immature Granulocytes 11/23/2022 1.0 (H)  0.0 - 0.5 % Final    Gran # (ANC) 11/23/2022 2.2  1.8 - 7.7 K/uL Final    Immature Grans (Abs) 11/23/2022 0.04  0.00 - 0.04 K/uL Final    Lymph # 11/23/2022 1.3  1.0 - 4.8 K/uL Final    Mono # 11/23/2022 0.5  0.3 - 1.0 K/uL Final    Eos # 11/23/2022 0.2  0.0 - 0.5 K/uL Final    Baso # 11/23/2022 0.06  0.00 - 0.20 K/uL Final    nRBC 11/23/2022 0  0 /100 WBC Final    Gran % 11/23/2022 51.7  38.0 - 73.0 % Final    Lymph % 11/23/2022 30.6  18.0 - 48.0 % Final    Mono % 11/23/2022 11.2  4.0 - 15.0 % Final    Eosinophil % 11/23/2022 4.1  0.0 - 8.0 % Final    Basophil % 11/23/2022 1.4  0.0 - 1.9 % Final    Differential Method 11/23/2022 Automated   Final    Sodium 11/23/2022 136  136 - 145 mmol/L Final    Potassium 11/23/2022 4.2  3.5 - 5.1 mmol/L Final    Chloride 11/23/2022 103  95 - 110 mmol/L Final     CO2 11/23/2022 23  23 - 29 mmol/L Final    Glucose 11/23/2022 114 (H)  70 - 110 mg/dL Final    BUN 11/23/2022 17  6 - 20 mg/dL Final    Creatinine 11/23/2022 1.4  0.5 - 1.4 mg/dL Final    Calcium 11/23/2022 9.1  8.7 - 10.5 mg/dL Final    Total Protein 11/23/2022 6.9  6.0 - 8.4 g/dL Final    Albumin 11/23/2022 4.0  3.5 - 5.2 g/dL Final    Total Bilirubin 11/23/2022 1.4 (H)  0.1 - 1.0 mg/dL Final    Alkaline Phosphatase 11/23/2022 61  55 - 135 U/L Final    AST 11/23/2022 20  10 - 40 U/L Final    ALT 11/23/2022 25  10 - 44 U/L Final    Anion Gap 11/23/2022 10  8 - 16 mmol/L Final    eGFR 11/23/2022 58 (A)  >60 mL/min/1.73 m^2 Final    Cholesterol 11/23/2022 244 (H)  120 - 199 mg/dL Final    Triglycerides 11/23/2022 131  30 - 150 mg/dL Final    HDL 11/23/2022 41  40 - 75 mg/dL Final    LDL Cholesterol 11/23/2022 176.8 (H)  63.0 - 159.0 mg/dL Final    HDL/Cholesterol Ratio 11/23/2022 16.8 (L)  20.0 - 50.0 % Final    Total Cholesterol/HDL Ratio 11/23/2022 6.0 (H)  2.0 - 5.0 Final    Non-HDL Cholesterol 11/23/2022 203  mg/dL Final    TSH 11/23/2022 1.479  0.400 - 4.000 uIU/mL Final   Lab Visit on 09/16/2022   Component Date Value Ref Range Status    WBC 09/16/2022 5.57  3.90 - 12.70 K/uL Final    RBC 09/16/2022 6.14  4.60 - 6.20 M/uL Final    Hemoglobin 09/16/2022 16.7  14.0 - 18.0 g/dL Final    Hematocrit 09/16/2022 50.9  40.0 - 54.0 % Final    MCV 09/16/2022 83  82 - 98 fL Final    MCH 09/16/2022 27.2  27.0 - 31.0 pg Final    MCHC 09/16/2022 32.8  32.0 - 36.0 g/dL Final    RDW 09/16/2022 15.9 (H)  11.5 - 14.5 % Final    Platelets 09/16/2022 407  150 - 450 K/uL Final    MPV 09/16/2022 8.3 (L)  9.2 - 12.9 fL Final    Immature Granulocytes 09/16/2022 0.4  0.0 - 0.5 % Final    Gran # (ANC) 09/16/2022 3.2  1.8 - 7.7 K/uL Final    Immature Grans (Abs) 09/16/2022 0.02  0.00 - 0.04 K/uL Final    Lymph # 09/16/2022 1.4  1.0 - 4.8 K/uL Final    Mono # 09/16/2022 0.5  0.3 - 1.0 K/uL Final    Eos # 09/16/2022 0.4  0.0 - 0.5 K/uL  Final    Baso # 09/16/2022 0.06  0.00 - 0.20 K/uL Final    nRBC 09/16/2022 0  0 /100 WBC Final    Gran % 09/16/2022 57.1  38.0 - 73.0 % Final    Lymph % 09/16/2022 25.5  18.0 - 48.0 % Final    Mono % 09/16/2022 9.3  4.0 - 15.0 % Final    Eosinophil % 09/16/2022 6.6  0.0 - 8.0 % Final    Basophil % 09/16/2022 1.1  0.0 - 1.9 % Final    Differential Method 09/16/2022 Automated   Final    Sodium 09/16/2022 141  136 - 145 mmol/L Final    Potassium 09/16/2022 4.3  3.5 - 5.1 mmol/L Final    Chloride 09/16/2022 100  95 - 110 mmol/L Final    CO2 09/16/2022 28  23 - 29 mmol/L Final    Glucose 09/16/2022 109  70 - 110 mg/dL Final    BUN 09/16/2022 22 (H)  6 - 20 mg/dL Final    Creatinine 09/16/2022 1.4  0.5 - 1.4 mg/dL Final    Calcium 09/16/2022 9.5  8.7 - 10.5 mg/dL Final    Total Protein 09/16/2022 7.7  6.0 - 8.4 g/dL Final    Albumin 09/16/2022 4.3  3.5 - 5.2 g/dL Final    Total Bilirubin 09/16/2022 0.5  0.1 - 1.0 mg/dL Final    Alkaline Phosphatase 09/16/2022 87  55 - 135 U/L Final    AST 09/16/2022 18  10 - 40 U/L Final    ALT 09/16/2022 17  10 - 44 U/L Final    Anion Gap 09/16/2022 13  8 - 16 mmol/L Final    eGFR 09/16/2022 58 (A)  >60 mL/min/1.73 m^2 Final   (  Accession #: [id number]  Transthoracic echo (TTE) complete  Order# [id number]  Reading physician: Chiki Mcdaniel MD Ordering physician: Chiki Mcdaniel MD Study date: 2/28/23     Reason for Exam  Priority: Routine  Dx: Cardiomyopathy, unspecified type [I42.9 (ICD-10-CM)]; Chronic combined systolic and diastolic congestive heart failure [I50.42 (ICD-10-CM)]; Mixed hyperlipidemia [E78.2 (ICD-10-CM)]; Mitral valve insufficiency, unspecified etiology [I34.0 (ICD-10-CM)]; Nonspecific abnormal electrocardiogram (ECG) (EKG) [R94.31 (ICD-10-CM)]; Apical mural thrombus [I51.3 (ICD-10-CM)]     View Images Vital Vitrea     Show images for Echo Saline Bubble? No  Summary    The left ventricle is mildly enlarged with moderate concentric hypertrophy and mildly decreased  "systolic function.  The estimated ejection fraction is 40%.  Grade I left ventricular diastolic dysfunction.  There is mild left ventricular global hypokinesis.  Normal right ventricular size with normal right ventricular systolic function.  Mild aortic regurgitation.  Mild mitral regurgitation.  Normal central venous pressure (3 mmHg).  The estimated PA systolic pressure is 17 mmHg.     Vitals    Height Weight   Chest Without Contrast  Status: Final result     MyChart Results Release    MyChart Status: Active  Results Release     PACS Images for [facility] [place] Viewer     Show images for CT Chest Without Contrast  All Reviewers List    Chiki Mcdaniel MD on 8/30/2022 15:04     CT Chest Without Contrast  Order: [id number]  Status: Final result      Visible to patient: Yes (seen)       Next appt: 03/13/2023 at 09:00 AM in Nephrology (Claudia Lux MD)       Dx: Solitary pulmonary nodule       0 Result Notes      Details    Reading Physician Reading Date Result Priority   Marni Martini MD  [phone]  [phone] 8/30/2022 Routine     Narrative & Impression  EXAMINATION:  CT CHEST WITHOUT CONTRAST     CLINICAL HISTORY:  "Abnormal xray - lung nodule, < 1 cm, mod-high risk;"     COMPARISON:  08/22/2022 radiographs     TECHNIQUE:  Volumetric data acquisition of the chest from the lung apices to the adrenals was obtained without intravenous contrast. Sagittal and coronal multiplanar reconstructions were performed. Lack of IV contrast material limits the assessment of mediastinal and abdominal structures.     FINDINGS:  Lungs and large airways: Trachea and proximal airways are patent.     Few small pulmonary nodules scattered throughout both lung fields, the largest is located in the lingula, and correspond to the finding on the chest radiograph 08/22/2022, a small solid nodule with central calcification measures 1.0 cm series 4, image 246, it is favored to be benign.  There are few other smaller " calcified benign fully calcified granulomas.  No acute focal area of airspace consolidation.  No bronchiectasis.  No significant reticulation or fibrosis.     Pleura: No pleural effusion or thickening.     Heart and pericardium: Heart size is normal. No pericardial effusion.  Mild calcified atherosclerosis of the coronaries.     Mediastinum and kinsey: No lymphadenopathy.     Chest wall and lower neck: Unremarkable.     Vessels: The thoracic aorta is of normal caliber and demonstrates very minimal atherosclerotic plaque. Enlarged azygous vein. The intrahepatic IVC is not well visualized.  Few collateral vessels noted in the right upper abdominal quadrant.     Bones: Mild age related degenerative changes.  No acute fracture.  No suspicious lytic or sclerotic lesion.     Upper abdomen:  Partially visualized hyperattenuating lesion of the right kidney suggestive a hemorrhagic cyst measuring 1.9 cm. There is a hypoattenuating lesion at the upper pole of the right kidney measuring 2.0 cm with low-attenuation suggestive of a cyst.     Impression:     Partially calcified pulmonary nodule within the lingular region corresponds to what was seen on radiograph 08/22/2022 exam.  There are few scattered pulmonary nodule bilaterally, For multiple solid nodules with any 6 mm or greater, Fleischner Society guidelines recommend follow up with non-contrast chest CT at 3-6 months and 18-24 months after discovery.     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     Right kidney lesion, one appear to represent a hemorrhagic cyst and one possible simple cyst, these could be better evaluated with ultrasound if clinically warranted.        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 No  Summary    The left ventricle is mildly enlarged with moderate concentric hypertrophy and moderately decreased systolic function.  Normal right ventricular size with normal right ventricular systolic function.  The estimated ejection fraction is 35%.  Grade I left ventricular diastolic dysfunction.  There is left ventricular global hypokinesis.  Mild tricuspid regurgitation.  Mild mitral regurgitation.  Normal central venous pressure (3 mmHg).  The estimated PA systolic pressure is 15 mmHg.     Component Ref Range & Units 3 d ago   (9/16/22) 2 wk ago   (9/2/22) 4 wk ago   (8/22/22) 1 yr ago   (2/22/21) 2 yr ago   (8/22/20) 2 yr ago   (2/27/20) 2 yr ago   (1/21/20)   WBC 3.90 - 12.70 K/uL 5.57  7.00  7.51  5.31  5.24  5.25  6.08    RBC 4.60 - 6.20 M/uL 6.14  6.04  6.28 High   5.15  5.26  4.90  5.85    Hemoglobin 14.0 - 18.0 g/dL 16.7  16.5  16.9  15.4  16.0  14.3  17.0    Hematocrit 40.0 - 54.0 % 50.9  50.4  52.7  48.7  48.4  43.2  52.5    MCV 82 - 98 fL 83  83  84  95  92  88  90    MCH 27.0 - 31.0 pg 27.2  27.3  26.9 Low   29.9  30.4  29.2  29.1    MCHC 32.0 - 36.0 g/dL 32.8  32.7  32.1  31.6 Low   33.1   33.1  32.4    RDW 11.5 - 14.5 % 15.9 High   14.8 High   15.1 High   15.4 High   14.3  14.0  13.5    Platelets 150 - 450 K/uL 407  320  436  394 High  R  372 High  R  414 High  R  447 High  R    MPV 9.2 - 12.9 fL 8.3 Low   8.5 Low   8.3 Low   8.8 Low   8.9 Low   8.4 Low   8.8 Low     Immature Granulocytes 0.0 - 0.5 % 0.4  0.1  0.4  0.6 High   0.4  0.6 High   0.5    Gran # (ANC) 1.8 - 7.7 K/uL 3.2  4.8  4.9  2.9  2.9  2.9  3.8    Immature Grans (Abs) 0.00 - 0.04 K/uL 0.02  0.01 CM  0.03 CM  0.03 CM  0.02 CM  0.03 CM  0.03 CM    Comment: Mild elevation in immature granulocytes is non specific and   can be seen in a variety of conditions including stress response,   acute inflammation, trauma and pregnancy. Correlation with other       Results   Comprehensive Metabolic Panel (Acc# [id number]) (Order [id number])  In Basket     Reviewed   Result Note   View in In Basket      MyChart Results Release    [facility] [employer] Status: Active  Results Release       Contains abnormal data Comprehensive Metabolic Panel  Order: [id number]  Status: Final result    Visible to patient: Yes (seen)     Next appt: 12/19/2022 at 02:00 PM in Internal Medicine (Opal Ortiz MD)     Dx: Mixed hyperlipidemia; Nonspecific abn...     0 Result Notes  Component Ref Range & Units 3 d ago   (9/16/22) 2 wk ago   (9/2/22) 2 wk ago   (9/2/22) 2 wk ago   (9/2/22) 4 wk ago   (8/22/22) 2 yr ago   (8/22/20) 2 yr ago   (5/25/20)   Sodium 136 - 145 mmol/L 141   140   140  138  138    Potassium 3.5 - 5.1 mmol/L 4.3   3.9   4.4  3.3 Low   4.0    Chloride 95 - 110 mmol/L 100   101   103  100  102    CO2 23 - 29 mmol/L 28   29   24  29  26    Glucose 70 - 110 mg/dL 109   95   102  99  103    BUN 6 - 20 mg/dL 22 High    19   23 High   23 High   18    Creatinine 0.5 - 1.4 mg/dL 1.4   1.3  1.3  1.7 High   1.5 High   1.2    Calcium 8.7 - 10.5 mg/dL 9.5   9.3   9.4  9.1  9.2    Total Protein 6.0 - 8.4 g/dL 7.7  7.3    7.3  7.3  7.3    Albumin 3.5 - 5.2 g/dL 4.3  4.1  4.1    4.2  4.2  4.2    Total Bilirubin 0.1 - 1.0 mg/dL 0.5  1.0 CM    1.1 High  CM  0.9 CM  0.5 CM    Comment: For infants and newborns, interpretation of results should be based   on gestational age, weight and in agreement with clinical   observations.     Premature Infant recommended reference ranges:   Up to 24 hours.............<8.0 mg/dL   Up to 48 hours............<12.0 mg/dL   3-5 days..................<15.0 mg/dL   6-29 days.................<15.0 mg/dL    Alkaline Phosphatase 55 - 135 U/L 87  82    67  62  67    AST 10 - 40 U/L 18  17    17  29  25    Comment: Specimen slightly hemolyzed   ALT 10 - 44 U/L 17  16    18  35  28    Anion Gap 8 - 16 mmol/L 13   10   13  9  10    eGFR >60 mL/min/1.73 m^2 58 Abnormal    >60   46 Abnormal       Resulting Agency  [facility]              Specimen Collected: 09/16/22 07:28 Last Resulted: 09/16/22 08:36              Ordering physician: Chiki Mcdaniel MD Study date: 8/22/22     Reason for Exam  Priority: Routine  Dx: Cardiomyopathy, unspecified type [I42.9 (ICD-10-CM)]; Chronic combined systolic and diastolic congestive heart failure [I50.42 (ICD-10-CM)]; Mixed hyperlipidemia [E78.2 (ICD-10-CM)]; Mitral valve insufficiency, unspecified etiology [I34.0 (ICD-10-CM)]; Nonspecific abnormal electrocardiogram (ECG) (EKG) [R94.31 (ICD-10-CM)]; JACLYN (obstructive sleep apnea) [G47.33 (ICD-10-CM)]     Result Image Hyperlink     Show images for Echo Saline Bubble? No  Summary    The left ventricle is moderately enlarged with mild concentric hypertrophy and severely decreased systolic function.  The estimated ejection fraction is 30%.  Grade I left ventricular diastolic dysfunction.  There is left ventricular global hypokinesis and focal mid inferior akinesis and focal anteroapical dyskinesis with an apparent small apical mural thrombus.  Mild right ventricular enlargement with low normal right ventricular systolic function.  Moderate left atrial  enlargement.  Mild aortic regurgitation.  Mild-to-moderate mitral regurgitation.  Normal central venous pressure (3 mmHg).  The estimated PA systolic pressure is 28 mmHg.     ICD-10-CM)]; Nonspecific abnormal electrocardiogram (ECG) (EKG) [R94.31 (ICD-10-CM)]; JACLYN (obstructive sleep apnea) [G47.33 (ICD-10-CM)]     Conclusion         The EKG portion of this study is negative for ischemia.    The patient reported no chest pain during the stress test.    There were no arrhythmias during stress.    The nuclear portion of this study will be reported separately.     Performing Clinician     seen)     Next appt: 08/30/2022 at 08:00 AM in Radiology ([facility])     Dx: Solitary pulmonary nodule; Stage 3 ch...     0 Result Notes  Details    Reading Physician Reading Date Result Priority   Abimael Reed MD  [phone]  [phone] 8/27/2022 Routine     Narrative & Impression  EXAMINATION:  US RETROPERITONEAL COMPLETE     CLINICAL HISTORY:  Chronic kidney disease, stage 3 unspecified     TECHNIQUE:  Ultrasound of the kidneys and urinary bladder was performed including color flow and Doppler evaluation of the kidneys.     FINDINGS:  The right and left kidneys measure 10.2 and 11.5 cm respectively.  The right and left resistive indices measure 0.55 and 0.57 which is within normal limits.  There is a 1.5 cm cyst within the superior right kidney.  There is no hydronephrosis or nephrolithiasis.  The left kidney contains a 1.2 cm cyst.  The bladder is unremarkable.     Impression:     Small bilateral renal cysts.        Electronically signed by: Abimael Reed MD  Date:                                            08/27/2022  Time:                                           09:20           Exam Ended: 08/27/22 09:03 Last Resulted: 08/27/22 09:20    George as an Unsuccessful Attempt           Reading Physician Reading Date Result Priority   Darrel Carmona MD  [phone]  [phone] 8/22/2022 Routine   Stacy Tom,  MD  [phone]  [phone] 8/22/2022      Narrative & Impression  EXAMINATION:  NM MYOCARDIAL PERFUSION SPECT MULTI STUDY     CLINICAL HISTORY:  Heart failure, known or suspected, initial workup;abnormal ECG;  Cardiomyopathy, unspecified     TECHNIQUE:  SPECT images in short, vertical and horizontal long axis were acquired after the injection of 10.6 mCi of Tc-99m Myoview at rest and 10.6 mCi during a cardiac stress. The clinical stress and ECG portion of the study is to be read separately.     CT was performed for attenuation correction.     COMPARISON:  Chest x-ray of today's date and myocardial perfusion 10/07/2016.     FINDINGS:  The quality of the study is good..     Stress SPECT images demonstrate diaphragmatic attenuation of the inferior wall which improves with attenuation correction.  On the resting images, there is matching inferior wall attenuation that improves with correction     The gated post-stress images reveal impaired wall motion and thickening with an estimated LVEF of 29 %. The LV cavity is dilated with an end-diastolic volume of 237 ml and an end-systolic volume of 170 ml.     CT is notable for an 8 mm left upper lobe nodule.     Impression:     1. No scintigraphic evidence of ischemia or scar..  2. The global left ventricular systolic function is diminished with an LV ejection fraction of 29 % and dilation of the left ventricle.  Wall motion is globally hypokinetic.  3. Incidental 8 mm left upper lobe nodule corresponding with finding on today's chest x-ray.  The lungs are incompletely imaged by attenuation correction CT, and further evaluation with dedicated chest CT is recommended in this former smoker.  This report was flagged in Epic as containing an incidental finding.     I, Darrel Carmona MD, attest that I reviewed and interpreted the images.     Electronically signed by resident: Stacy Tom  Date:                                            08/22/2022  Time:                                            16:06     Electronically signed by: Darrel Carmona  Date:                                            08/22/2022  Time:                                           17:49           Exam Ended: 08/22/22 09:28           Assessment:     1. Dilated cardiomyopathy    2. Chronic combined systolic and diastolic congestive heart failure    3. Mixed hyperlipidemia    4. Nonrheumatic mitral valve regurgitation    5. Chronic kidney disease, stage 3a      Plan:   Fan was seen today for follow-up.    Diagnoses and all orders for this visit:    Dilated cardiomyopathy  -     IN OFFICE EKG 12-LEAD (to [facility])  -     CBC Auto Differential; Future  -     CK; Future  -     Comprehensive Metabolic Panel; Future  -     Lipid Panel; Future  -     TSH; Future  -     Echo Saline Bubble? No; Future    Chronic combined systolic and diastolic congestive heart failure  -     IN OFFICE EKG 12-LEAD (to [facility])  -     CBC Auto Differential; Future  -     CK; Future  -     Comprehensive Metabolic Panel; Future  -     Lipid Panel; Future  -     TSH; Future  -     Echo Saline Bubble? No; Future    Mixed hyperlipidemia  -     IN OFFICE EKG 12-LEAD (to [facility])  -     CBC Auto Differential; Future  -     CK; Future  -     Comprehensive Metabolic Panel; Future  -     Lipid Panel; Future  -     TSH; Future  -     Echo Saline Bubble? No; Future    Nonrheumatic mitral valve regurgitation  -     IN OFFICE EKG 12-LEAD (to [facility])  -     CBC Auto Differential; Future  -     CK; Future  -     Comprehensive Metabolic Panel; Future  -     Lipid Panel; Future  -     TSH; Future  -     Echo Saline Bubble? No; Future    Chronic kidney disease, stage 3a  -     IN OFFICE EKG 12-LEAD (to [facility])  -     CBC Auto Differential; Future  -     CK; Future  -     Comprehensive Metabolic Panel; Future  -     Lipid Panel; Future  -     TSH; Future  -     Echo Saline Bubble? No; Future     Pt has a non-ischemic cardiomyopathy which has improved with guideline directed medical  therapy    Apical mural thrombus resolved with anticoagulation     HBP is controlled with meds    HLD is not controlled due to noncompliance with meds    Compliance with meds and rationale for meds discussed.    RTC 4 months with lab and echocardiogram  No follow-ups on file.

## 2023-04-18 ENCOUNTER — PATIENT MESSAGE (OUTPATIENT)
Dept: INTERNAL MEDICINE | Facility: CLINIC | Age: 63
End: 2023-04-18
Payer: COMMERCIAL

## 2023-06-08 ENCOUNTER — PATIENT MESSAGE (OUTPATIENT)
Dept: INTERNAL MEDICINE | Facility: CLINIC | Age: 63
End: 2023-06-08
Payer: COMMERCIAL

## 2023-06-26 ENCOUNTER — PATIENT MESSAGE (OUTPATIENT)
Dept: INTERNAL MEDICINE | Facility: CLINIC | Age: 63
End: 2023-06-26
Payer: COMMERCIAL

## 2023-07-05 ENCOUNTER — LAB VISIT (OUTPATIENT)
Dept: LAB | Facility: HOSPITAL | Age: 63
End: 2023-07-05
Attending: INTERNAL MEDICINE
Payer: COMMERCIAL

## 2023-07-05 DIAGNOSIS — Z12.5 PROSTATE CANCER SCREENING: ICD-10-CM

## 2023-07-05 DIAGNOSIS — I10 ESSENTIAL HYPERTENSION: ICD-10-CM

## 2023-07-05 LAB
ALBUMIN SERPL BCP-MCNC: 4.1 G/DL (ref 3.5–5.2)
ALP SERPL-CCNC: 63 U/L (ref 55–135)
ALT SERPL W/O P-5'-P-CCNC: 15 U/L (ref 10–44)
ANION GAP SERPL CALC-SCNC: 7 MMOL/L (ref 8–16)
AST SERPL-CCNC: 16 U/L (ref 10–40)
BILIRUB SERPL-MCNC: 0.8 MG/DL (ref 0.1–1)
BUN SERPL-MCNC: 17 MG/DL (ref 8–23)
CALCIUM SERPL-MCNC: 9.6 MG/DL (ref 8.7–10.5)
CHLORIDE SERPL-SCNC: 103 MMOL/L (ref 95–110)
CO2 SERPL-SCNC: 30 MMOL/L (ref 23–29)
COMPLEXED PSA SERPL-MCNC: 1.4 NG/ML (ref 0–4)
CREAT SERPL-MCNC: 1.5 MG/DL (ref 0.5–1.4)
EST. GFR  (NO RACE VARIABLE): 52.6 ML/MIN/1.73 M^2
GLUCOSE SERPL-MCNC: 108 MG/DL (ref 70–110)
POTASSIUM SERPL-SCNC: 4.8 MMOL/L (ref 3.5–5.1)
PROT SERPL-MCNC: 7 G/DL (ref 6–8.4)
SODIUM SERPL-SCNC: 140 MMOL/L (ref 136–145)

## 2023-07-05 PROCEDURE — 80053 COMPREHEN METABOLIC PANEL: CPT | Performed by: INTERNAL MEDICINE

## 2023-07-05 PROCEDURE — 36415 COLL VENOUS BLD VENIPUNCTURE: CPT | Mod: PO | Performed by: INTERNAL MEDICINE

## 2023-07-05 PROCEDURE — 84153 ASSAY OF PSA TOTAL: CPT | Performed by: INTERNAL MEDICINE

## 2023-07-06 ENCOUNTER — OFFICE VISIT (OUTPATIENT)
Dept: INTERNAL MEDICINE | Facility: CLINIC | Age: 63
End: 2023-07-06
Payer: COMMERCIAL

## 2023-07-06 VITALS
WEIGHT: 211.63 LBS | HEIGHT: 69 IN | BODY MASS INDEX: 31.34 KG/M2 | SYSTOLIC BLOOD PRESSURE: 110 MMHG | HEART RATE: 93 BPM | OXYGEN SATURATION: 95 % | DIASTOLIC BLOOD PRESSURE: 76 MMHG

## 2023-07-06 DIAGNOSIS — N18.31 CHRONIC KIDNEY DISEASE, STAGE 3A: ICD-10-CM

## 2023-07-06 DIAGNOSIS — R09.81 CHRONIC NASAL CONGESTION: Primary | ICD-10-CM

## 2023-07-06 DIAGNOSIS — E78.2 MIXED HYPERLIPIDEMIA: ICD-10-CM

## 2023-07-06 DIAGNOSIS — Z00.00 ANNUAL PHYSICAL EXAM: ICD-10-CM

## 2023-07-06 DIAGNOSIS — I10 ESSENTIAL HYPERTENSION: ICD-10-CM

## 2023-07-06 DIAGNOSIS — I50.42 CHRONIC COMBINED SYSTOLIC AND DIASTOLIC CONGESTIVE HEART FAILURE: ICD-10-CM

## 2023-07-06 DIAGNOSIS — E66.09 CLASS 1 OBESITY DUE TO EXCESS CALORIES WITH SERIOUS COMORBIDITY AND BODY MASS INDEX (BMI) OF 31.0 TO 31.9 IN ADULT: ICD-10-CM

## 2023-07-06 DIAGNOSIS — R79.89 LOW TESTOSTERONE IN MALE: ICD-10-CM

## 2023-07-06 PROCEDURE — 1160F RVW MEDS BY RX/DR IN RCRD: CPT | Mod: CPTII,S$GLB,, | Performed by: INTERNAL MEDICINE

## 2023-07-06 PROCEDURE — 99999 PR PBB SHADOW E&M-EST. PATIENT-LVL V: CPT | Mod: PBBFAC,,, | Performed by: INTERNAL MEDICINE

## 2023-07-06 PROCEDURE — 3008F BODY MASS INDEX DOCD: CPT | Mod: CPTII,S$GLB,, | Performed by: INTERNAL MEDICINE

## 2023-07-06 PROCEDURE — 1160F PR REVIEW ALL MEDS BY PRESCRIBER/CLIN PHARMACIST DOCUMENTED: ICD-10-PCS | Mod: CPTII,S$GLB,, | Performed by: INTERNAL MEDICINE

## 2023-07-06 PROCEDURE — 99999 PR PBB SHADOW E&M-EST. PATIENT-LVL V: ICD-10-PCS | Mod: PBBFAC,,, | Performed by: INTERNAL MEDICINE

## 2023-07-06 PROCEDURE — 3074F PR MOST RECENT SYSTOLIC BLOOD PRESSURE < 130 MM HG: ICD-10-PCS | Mod: CPTII,S$GLB,, | Performed by: INTERNAL MEDICINE

## 2023-07-06 PROCEDURE — 3078F PR MOST RECENT DIASTOLIC BLOOD PRESSURE < 80 MM HG: ICD-10-PCS | Mod: CPTII,S$GLB,, | Performed by: INTERNAL MEDICINE

## 2023-07-06 PROCEDURE — 3008F PR BODY MASS INDEX (BMI) DOCUMENTED: ICD-10-PCS | Mod: CPTII,S$GLB,, | Performed by: INTERNAL MEDICINE

## 2023-07-06 PROCEDURE — 99214 OFFICE O/P EST MOD 30 MIN: CPT | Mod: S$GLB,,, | Performed by: INTERNAL MEDICINE

## 2023-07-06 PROCEDURE — 1159F MED LIST DOCD IN RCRD: CPT | Mod: CPTII,S$GLB,, | Performed by: INTERNAL MEDICINE

## 2023-07-06 PROCEDURE — 3078F DIAST BP <80 MM HG: CPT | Mod: CPTII,S$GLB,, | Performed by: INTERNAL MEDICINE

## 2023-07-06 PROCEDURE — 3074F SYST BP LT 130 MM HG: CPT | Mod: CPTII,S$GLB,, | Performed by: INTERNAL MEDICINE

## 2023-07-06 PROCEDURE — 1159F PR MEDICATION LIST DOCUMENTED IN MEDICAL RECORD: ICD-10-PCS | Mod: CPTII,S$GLB,, | Performed by: INTERNAL MEDICINE

## 2023-07-06 PROCEDURE — 99214 PR OFFICE/OUTPT VISIT, EST, LEVL IV, 30-39 MIN: ICD-10-PCS | Mod: S$GLB,,, | Performed by: INTERNAL MEDICINE

## 2023-07-06 NOTE — PROGRESS NOTES
Patient ID: Fan Chris is a 63 y.o. male.    Chief Complaint: Hypertension    HPI Fan is a 63 y.o. male with  hyperlipidemia, hypertension, obstructive sleep apnea, obesity, CKD stage 3, and dilated cardiomyopathy who presents for routine follow up of medical conditions.     He stopped his blood pressure medications about 3 months ago. Reports BP has been normal in that time. He still takes entresto but takes it about 5 times total per week.     Reviewed lab results from yesterday. Reviewed last lipid panel.     He has been getting testosterone treatment from [facility] and would like to know if I would prescribe his testosterone. Says his fatigue improved when he started taking testosterone. He no longer follows with nephrology for CKD stage 3; he prefers that I monitor his kidney function at this time.     He complains of chronic nasal congestion, despite using nasal sprays. No further acute complaints.     Health Maintenance Topics with due status: Not Due       Topic Last Completion Date    Colorectal Cancer Screening 06/07/2016    Influenza Vaccine 09/28/2020    TETANUS VACCINE 02/26/2021    Hemoglobin A1c (Diabetic Prevention Screening) 09/02/2022    High Dose Statin 12/19/2022    Lipid Panel 02/28/2023    PROSTATE-SPECIFIC ANTIGEN 07/05/2023      Review of Systems   HENT:  Positive for congestion.    All other systems reviewed and are negative.      Objective:     Vitals:    07/06/23 1354   BP: 110/76   Pulse: 93        Physical Exam  Vitals reviewed.   Constitutional:       General: He is not in acute distress.     Appearance: Normal appearance. He is well-developed. He is obese. He is not ill-appearing, toxic-appearing or diaphoretic.   HENT:      Head: Normocephalic and atraumatic.      Right Ear: Tympanic membrane, ear canal and external ear normal. There is no impacted cerumen.      Left Ear: Tympanic membrane, ear canal and external ear normal. There is no impacted cerumen.      Nose: Nose normal.    Eyes:      General:         Right eye: No discharge.         Left eye: No discharge.      Extraocular Movements: Extraocular movements intact.      Conjunctiva/sclera: Conjunctivae normal.   Neck:      Thyroid: No thyromegaly.      Trachea: No tracheal deviation.   Cardiovascular:      Rate and Rhythm: Normal rate and regular rhythm.      Pulses: Normal pulses.      Heart sounds: Normal heart sounds. No murmur heard.    No friction rub. No gallop.   Pulmonary:      Effort: Pulmonary effort is normal. No respiratory distress.      Breath sounds: Normal breath sounds. No stridor. No wheezing, rhonchi or rales.   Chest:      Chest wall: No tenderness.   Abdominal:      General: Bowel sounds are normal. There is no distension.      Palpations: Abdomen is soft. There is no mass.      Tenderness: There is no abdominal tenderness. There is no guarding or rebound.      Hernia: No hernia is present.   Musculoskeletal:         General: No tenderness or deformity.      Cervical back: Neck supple.      Right lower leg: No edema.      Left lower leg: No edema.   Lymphadenopathy:      Cervical: No cervical adenopathy.   Skin:     General: Skin is warm and dry.      Findings: No erythema or rash.   Neurological:      General: No focal deficit present.      Mental Status: He is alert and oriented to person, place, and time. Mental status is at baseline.      Cranial Nerves: No cranial nerve deficit.   Psychiatric:         Mood and Affect: Mood normal.         Behavior: Behavior normal.         Thought Content: Thought content normal.         Judgment: Judgment normal.       Assessment:       1. Chronic nasal congestion Chronic   2. Low testosterone in male Chronic   3. Chronic kidney disease, stage 3a Chronic   4. Essential hypertension Inactive   5. Mixed hyperlipidemia Chronic   6. Chronic combined systolic and diastolic congestive heart failure Well controlled   7. Class 1 obesity due to excess calories with serious  comorbidity and body mass index (BMI) of 31.0 to 31.9 in adult Chronic   8. Annual physical exam        Plan:         Chronic nasal congestion  -     Ambulatory referral/consult to ENT; Future; Expected date: 07/13/2023    Low testosterone in male  -     Ambulatory referral/consult to Urology; Future; Expected date: 07/13/2023    Chronic kidney disease, stage 3a  Comments:  Avoid NSAIDs (except daily aspirin). Stay well hydrated with water. Continue to monitor     Essential hypertension  Comments:  Not on meds. Continue to monitor     Mixed hyperlipidemia  Comments:  Re-start statin    Chronic combined systolic and diastolic congestive heart failure  Comments:  Continue entresto. Continue to follow with Cardiology     Class 1 obesity due to excess calories with serious comorbidity and body mass index (BMI) of 31.0 to 31.9 in adult    Annual physical exam  -     CBC Auto Differential; Future; Expected date: 01/01/2024  -     Comprehensive Metabolic Panel; Future; Expected date: 01/01/2024  -     Hemoglobin A1C; Future; Expected date: 01/01/2024  -     Lipid Panel; Future; Expected date: 01/01/2024  -     TSH; Future; Expected date: 01/01/2024  -     PSA, Screening; Future; Expected date: 01/01/2024        RTC 6 months for annual exam    Warning signs discussed, patient to call with any further issues or worsening of symptoms.       Parts of the above note were dictated using a voice dictation software. Please excuse any grammatical or typographical errors.

## 2023-07-10 ENCOUNTER — OFFICE VISIT (OUTPATIENT)
Dept: UROLOGY | Facility: CLINIC | Age: 63
End: 2023-07-10
Payer: COMMERCIAL

## 2023-07-10 ENCOUNTER — TELEPHONE (OUTPATIENT)
Dept: ADMINISTRATIVE | Facility: OTHER | Age: 63
End: 2023-07-10
Payer: COMMERCIAL

## 2023-07-10 VITALS
SYSTOLIC BLOOD PRESSURE: 159 MMHG | WEIGHT: 207 LBS | BODY MASS INDEX: 30.66 KG/M2 | DIASTOLIC BLOOD PRESSURE: 95 MMHG | HEIGHT: 69 IN | HEART RATE: 48 BPM

## 2023-07-10 DIAGNOSIS — R53.83 OTHER FATIGUE: Primary | ICD-10-CM

## 2023-07-10 DIAGNOSIS — I50.42 CHRONIC COMBINED SYSTOLIC AND DIASTOLIC CONGESTIVE HEART FAILURE: ICD-10-CM

## 2023-07-10 DIAGNOSIS — N52.01 ERECTILE DYSFUNCTION DUE TO ARTERIAL INSUFFICIENCY: ICD-10-CM

## 2023-07-10 PROBLEM — R79.89 LOW TESTOSTERONE IN MALE: Status: RESOLVED | Noted: 2023-07-06 | Resolved: 2023-07-10

## 2023-07-10 PROCEDURE — 3077F PR MOST RECENT SYSTOLIC BLOOD PRESSURE >= 140 MM HG: ICD-10-PCS | Mod: CPTII,S$GLB,, | Performed by: UROLOGY

## 2023-07-10 PROCEDURE — 3080F PR MOST RECENT DIASTOLIC BLOOD PRESSURE >= 90 MM HG: ICD-10-PCS | Mod: CPTII,S$GLB,, | Performed by: UROLOGY

## 2023-07-10 PROCEDURE — 1159F MED LIST DOCD IN RCRD: CPT | Mod: CPTII,S$GLB,, | Performed by: UROLOGY

## 2023-07-10 PROCEDURE — 99999 PR PBB SHADOW E&M-EST. PATIENT-LVL IV: ICD-10-PCS | Mod: PBBFAC,,, | Performed by: UROLOGY

## 2023-07-10 PROCEDURE — 99204 PR OFFICE/OUTPT VISIT, NEW, LEVL IV, 45-59 MIN: ICD-10-PCS | Mod: S$GLB,,, | Performed by: UROLOGY

## 2023-07-10 PROCEDURE — 3077F SYST BP >= 140 MM HG: CPT | Mod: CPTII,S$GLB,, | Performed by: UROLOGY

## 2023-07-10 PROCEDURE — 3080F DIAST BP >= 90 MM HG: CPT | Mod: CPTII,S$GLB,, | Performed by: UROLOGY

## 2023-07-10 PROCEDURE — 3008F BODY MASS INDEX DOCD: CPT | Mod: CPTII,S$GLB,, | Performed by: UROLOGY

## 2023-07-10 PROCEDURE — 99999 PR PBB SHADOW E&M-EST. PATIENT-LVL IV: CPT | Mod: PBBFAC,,, | Performed by: UROLOGY

## 2023-07-10 PROCEDURE — 1160F PR REVIEW ALL MEDS BY PRESCRIBER/CLIN PHARMACIST DOCUMENTED: ICD-10-PCS | Mod: CPTII,S$GLB,, | Performed by: UROLOGY

## 2023-07-10 PROCEDURE — 1159F PR MEDICATION LIST DOCUMENTED IN MEDICAL RECORD: ICD-10-PCS | Mod: CPTII,S$GLB,, | Performed by: UROLOGY

## 2023-07-10 PROCEDURE — 3008F PR BODY MASS INDEX (BMI) DOCUMENTED: ICD-10-PCS | Mod: CPTII,S$GLB,, | Performed by: UROLOGY

## 2023-07-10 PROCEDURE — 1160F RVW MEDS BY RX/DR IN RCRD: CPT | Mod: CPTII,S$GLB,, | Performed by: UROLOGY

## 2023-07-10 PROCEDURE — 99204 OFFICE O/P NEW MOD 45 MIN: CPT | Mod: S$GLB,,, | Performed by: UROLOGY

## 2023-07-10 NOTE — LETTER
July 10, 2023        Opal Ortiz MD  [address] [facility] [address]             [facility] 4th Fl  [address]  [place] [address]  Phone: [phone]   Patient: Fan Chris   MR Number: [id number]   YOB: 1960   Date of Visit: 7/10/2023       Dear Dr. Ortiz:    Thank you for referring Fan Chris to me for evaluation. Attached you will find relevant portions of my assessment and plan of care.    If you have questions, please do not hesitate to call me. I look forward to following Fan Chris along with you.    Sincerely,      Jeremias Esteban MD            CC  No Recipients    Enclosure

## 2023-07-10 NOTE — PROGRESS NOTES
CHIEF COMPLAINT:    Mr. Chris is a 63 y.o. male presenting for a consultation at the request of Dr. Ortiz. Patient presents with low T.    PRESENTING ILLNESS:    Fan Chris is a 63 y.o. male with low T. Previously on TRT from [facility].  Last injection 5 days ago. Has CHF (EF 40% 2023).  Started it for fatigue.  He states that he thought his T was around 300 when he started it.    He has ED.  Uses Cialis with good results.    REVIEW OF SYSTEMS:    Fan Chris denies headache, blurred vision, fever, nausea, vomiting, chills, abdominal pain, chest pain, sore throat, bleeding per rectum, cough, SOB, recent loss of consciousness, recent mental status changes, seizures, dizziness, or upper or lower extremity weakness.    SYDNIE  1. 1  2. 2  3. 2  4. 2  5. 3      PATIENT HISTORY:    Past Medical History:   Diagnosis Date    Asthma     Bilateral renal cysts     Bronchitis     Cardiomyopathy     CHF (congestive heart failure)     CKD (chronic kidney disease) stage 3, GFR 30-59 ml/min     Hyperlipidemia 10/14/2016    Hypertension     Low testosterone level in male     Sinusitis     Sleep apnea        Past Surgical History:   Procedure Laterality Date    arm fracture      EYE SURGERY      TONSILLECTOMY         Family History   Problem Relation Age of Onset    Lung cancer Mother     Cancer Mother 61        lung    Hypertension Father     Hyperlipidemia Father     Heart disease Paternal Grandmother        Social History     Socioeconomic History    Marital status: Single   Tobacco Use    Smoking status: Former     Packs/day: 1.00     Years: 8.00     Pack years: 8.00     Types: Cigarettes     Quit date: 10/4/1984     Years since quittin.7    Smokeless tobacco: Never   Substance and Sexual Activity    Alcohol use: Not Currently     Alcohol/week: 1.0 standard drink     Types: 1 Cans of beer per week    Drug use: No    Sexual activity: Never     Partners: Female   Social History Narrative    Lives alone with his dog.  Generally eats outside and indulges in sweets and eats large portions of food. Drinks juice and sweet tea. Drinks 4-5 glasses of water.      Social Determinants of Health     Financial Resource Strain: Low Risk     Difficulty of Paying Living Expenses: Not hard at all   Food Insecurity: No Food Insecurity    Worried About Running Out of Food in the Last Year: Never true    Ran Out of Food in the Last Year: Never true   Transportation Needs: No Transportation Needs    Lack of Transportation (Medical): No    Lack of Transportation (Non-Medical): No   Physical Activity: Sufficiently Active    Days of Exercise per Week: 4 days    Minutes of Exercise per Session: 40 min   Stress: No Stress Concern Present    Feeling of Stress : Only a little   Social Connections: Unknown    Frequency of Communication with Friends and Family: More than three times a week    Frequency of Social Gatherings with Friends and Family: More than three times a week    Active Member of Clubs or Organizations: Yes    Attends Club or Organization Meetings: More than 4 times per year    Marital Status:    Housing Stability: High Risk    Unable to Pay for Housing in the Last Year: Yes    Number of Places Lived in the Last Year: 1    Unstable Housing in the Last Year: No       Allergies:  Patient has no known allergies.    Medications:    Current Outpatient Medications:     aspirin (ECOTRIN) 81 MG EC tablet, Take 1 tablet (81 mg total) by mouth once daily., Disp: 30 tablet, Rfl: 11    atorvastatin (LIPITOR) 40 MG tablet, Take 1 tablet (40 mg total) by mouth once daily., Disp: 90 tablet, Rfl: 3    fluticasone propionate (FLONASE) 50 mcg/actuation nasal spray, 2 sprays by Each Nostril route as needed., Disp: , Rfl:     omega-3 fatty acids (FISH OIL CONCENTRATE ORAL), Take by mouth., Disp: , Rfl:     sacubitriL-valsartan (ENTRESTO) 49-51 mg per tablet, Take 1 tablet by mouth 2 (two) times daily., Disp: 180 tablet, Rfl: 3    TESTOSTERONE CYPIONATE  IM, Inject into the muscle. Twice weekly, Disp: , Rfl:     PHYSICAL EXAMINATION:    The patient generally appears in good health, is appropriately interactive, and is in no apparent distress.     Eyes: anicteric sclerae, moist conjunctivae; no lid-lag; PERRLA     HENT: Atraumatic; oropharynx clear with moist mucous membranes and no mucosal ulcerations;normal hard and soft palate.  No evidence of lymphadenopathy.    Neck: Trachea midline.  No thyromegaly.    Skin: No lesions.    Mental: Cooperative with normal affect.  Is oriented to time, place, and person.    Neuro: Grossly intact.    Chest: Normal inspiratory effort.   No accessory muscles.  No audible wheezes.  Respirations symmetric on inspiration and expiration.    Heart: Regular rhythm.      Abdomen:  Soft, non-tender. No masses or organomegaly. Bladder is not palpable. No evidence of flank discomfort. No evidence of inguinal hernia.    He refused  exam.    Extremities: No clubbing, cyanosis, or edema      LABS:      Lab Results   Component Value Date    PSA 1.4 07/05/2023    PSA 1.1 02/22/2021    PSA 0.98 01/21/2020       IMPRESSION:    Encounter Diagnoses   Name Primary?    Other fatigue Yes    Chronic combined systolic and diastolic congestive heart failure     Erectile dysfunction due to arterial insufficiency          PLAN:    1. Discussed that testosterone clinics such as [facility] do not follow AUA or endocrine society guidelines and often prescribe TRT inappropriately.  Discussed that TRT is contraindicated in CHF.  Additionally, we have no labs demonstrating a low T.  2. He should stop TRT.  3. Can continue the Cialis for the ED.  4. RTC prn.    Copy to: Diana

## 2023-07-12 ENCOUNTER — TELEPHONE (OUTPATIENT)
Dept: ADMINISTRATIVE | Facility: OTHER | Age: 63
End: 2023-07-12
Payer: COMMERCIAL

## 2023-07-12 ENCOUNTER — TELEPHONE (OUTPATIENT)
Dept: INTERNAL MEDICINE | Facility: CLINIC | Age: 63
End: 2023-07-12
Payer: COMMERCIAL

## 2023-07-20 ENCOUNTER — HOSPITAL ENCOUNTER (OUTPATIENT)
Dept: CARDIOLOGY | Facility: HOSPITAL | Age: 63
Discharge: HOME | End: 2023-07-20
Attending: INTERNAL MEDICINE
Payer: COMMERCIAL

## 2023-07-20 VITALS — WEIGHT: 207 LBS | BODY MASS INDEX: 30.66 KG/M2 | HEIGHT: 69 IN

## 2023-07-20 DIAGNOSIS — N18.31 CHRONIC KIDNEY DISEASE, STAGE 3A: ICD-10-CM

## 2023-07-20 DIAGNOSIS — I34.0 NONRHEUMATIC MITRAL VALVE REGURGITATION: ICD-10-CM

## 2023-07-20 DIAGNOSIS — I42.0 DILATED CARDIOMYOPATHY: ICD-10-CM

## 2023-07-20 DIAGNOSIS — I50.42 CHRONIC COMBINED SYSTOLIC AND DIASTOLIC CONGESTIVE HEART FAILURE: ICD-10-CM

## 2023-07-20 DIAGNOSIS — E78.2 MIXED HYPERLIPIDEMIA: ICD-10-CM

## 2023-07-20 PROCEDURE — 93306 TTE W/DOPPLER COMPLETE: CPT | Mod: 26,,, | Performed by: INTERNAL MEDICINE

## 2023-07-20 PROCEDURE — 93306 ECHO (CUPID ONLY): ICD-10-PCS | Mod: 26,,, | Performed by: INTERNAL MEDICINE

## 2023-07-20 PROCEDURE — 93306 TTE W/DOPPLER COMPLETE: CPT

## 2023-07-24 LAB
ASCENDING AORTA: 2.94 CM
AV REGURGITATION PRESSURE HALF TIME: 641.85 MS
BSA FOR ECHO PROCEDURE: 2.14 M2
CV ECHO LV RWT: 0.43 CM
DOP CALC LVOT AREA: 4.9 CM2
DOP CALC LVOT DIAMETER: 2.51 CM
DOP CALC LVOT PEAK VEL: 0.77 M/S
DOP CALC LVOT STROKE VOLUME: 96.93 CM3
DOP CALC MV VTI: 17.2 CM
DOP CALCLVOT PEAK VEL VTI: 19.6 CM
E WAVE DECELERATION TIME: 171.95 MSEC
E/A RATIO: 0.65
E/E' RATIO: 13.2 M/S
ECHO LV POSTERIOR WALL: 1.25 CM (ref 0.6–1.1)
EJECTION FRACTION: 40 %
FRACTIONAL SHORTENING: 20 % (ref 28–44)
INTERVENTRICULAR SEPTUM: 1.15 CM (ref 0.6–1.1)
IVC DIAMETER: 0.37 CM
LA MAJOR: 5.33 CM
LA MINOR: 5.33 CM
LA WIDTH: 3.5 CM
LEFT ATRIUM SIZE: 4.64 CM
LEFT ATRIUM VOLUME INDEX MOD: 21.9 ML/M2
LEFT ATRIUM VOLUME INDEX: 35 ML/M2
LEFT ATRIUM VOLUME MOD: 45.9 CM3
LEFT ATRIUM VOLUME: 73.58 CM3
LEFT INTERNAL DIMENSION IN SYSTOLE: 4.64 CM (ref 2.1–4)
LEFT VENTRICLE DIASTOLIC VOLUME INDEX: 78.59 ML/M2
LEFT VENTRICLE DIASTOLIC VOLUME: 165.03 ML
LEFT VENTRICLE MASS INDEX: 141 G/M2
LEFT VENTRICLE SYSTOLIC VOLUME INDEX: 47.2 ML/M2
LEFT VENTRICLE SYSTOLIC VOLUME: 99.14 ML
LEFT VENTRICULAR INTERNAL DIMENSION IN DIASTOLE: 5.78 CM (ref 3.5–6)
LEFT VENTRICULAR MASS: 295.33 G
LV LATERAL E/E' RATIO: 13.2 M/S
LV SEPTAL E/E' RATIO: 13.2 M/S
LVOT MG: 1.79 MMHG
LVOT MV: 0.66 CM/S
MV A" WAVE DURATION": 114.18 MSEC
MV MEAN GRADIENT: 1 MMHG
MV PEAK A VEL: 1.01 M/S
MV PEAK E VEL: 0.66 M/S
MV PEAK GRADIENT: 4 MMHG
MV VALVE AREA BY CONTINUITY EQUATION: 5.64 CM2
OHS LV EJECTION FRACTION SIMPSONS BIPLANE MOD: 4 %
PISA AR MAX VEL: 2.9 M/S
PISA MRMAX VEL: 5.47 M/S
PISA TR MAX VEL: 2.24 M/S
PULM VEIN S/D RATIO: 1.37
PV MV: 0.59 M/S
PV PEAK D VEL: 0.35 M/S
PV PEAK S VEL: 0.48 M/S
PV PEAK VELOCITY: 0.84 CM/S
RA MAJOR: 4.13 CM
RA WIDTH: 3.7 CM
RIGHT VENTRICULAR END-DIASTOLIC DIMENSION: 2.7 CM
RV TISSUE DOPPLER FREE WALL SYSTOLIC VELOCITY 1 (APICAL 4 CHAMBER VIEW): 7.91 CM/S
STJ: 2.85 CM
TDI LATERAL: 0.05 M/S
TDI SEPTAL: 0.05 M/S
TDI: 0.05 M/S
TR MAX PG: 20 MMHG

## 2023-07-25 ENCOUNTER — OFFICE VISIT (OUTPATIENT)
Dept: CARDIOLOGY | Facility: CLINIC | Age: 63
End: 2023-07-25
Payer: COMMERCIAL

## 2023-07-25 VITALS
WEIGHT: 208.13 LBS | SYSTOLIC BLOOD PRESSURE: 146 MMHG | HEIGHT: 69 IN | HEART RATE: 78 BPM | BODY MASS INDEX: 30.83 KG/M2 | DIASTOLIC BLOOD PRESSURE: 82 MMHG

## 2023-07-25 DIAGNOSIS — N18.30 STAGE 3 CHRONIC KIDNEY DISEASE, UNSPECIFIED WHETHER STAGE 3A OR 3B CKD: ICD-10-CM

## 2023-07-25 DIAGNOSIS — I34.0 NONRHEUMATIC MITRAL VALVE REGURGITATION: ICD-10-CM

## 2023-07-25 DIAGNOSIS — E78.2 MIXED HYPERLIPIDEMIA: ICD-10-CM

## 2023-07-25 DIAGNOSIS — G47.33 OSA (OBSTRUCTIVE SLEEP APNEA): ICD-10-CM

## 2023-07-25 DIAGNOSIS — R91.1 SOLITARY PULMONARY NODULE: ICD-10-CM

## 2023-07-25 DIAGNOSIS — Z91.199 NONCOMPLIANCE: ICD-10-CM

## 2023-07-25 DIAGNOSIS — I10 ESSENTIAL HYPERTENSION: ICD-10-CM

## 2023-07-25 DIAGNOSIS — I42.9 CARDIOMYOPATHY, UNSPECIFIED TYPE: Primary | ICD-10-CM

## 2023-07-25 DIAGNOSIS — I50.42 CHRONIC COMBINED SYSTOLIC AND DIASTOLIC CONGESTIVE HEART FAILURE: ICD-10-CM

## 2023-07-25 PROCEDURE — 93000 EKG 12-LEAD: ICD-10-PCS | Mod: S$GLB,,, | Performed by: INTERNAL MEDICINE

## 2023-07-25 PROCEDURE — 99999 PR PBB SHADOW E&M-EST. PATIENT-LVL III: ICD-10-PCS | Mod: PBBFAC,,, | Performed by: INTERNAL MEDICINE

## 2023-07-25 PROCEDURE — 99214 OFFICE O/P EST MOD 30 MIN: CPT | Mod: 25,S$GLB,, | Performed by: INTERNAL MEDICINE

## 2023-07-25 PROCEDURE — 3008F PR BODY MASS INDEX (BMI) DOCUMENTED: ICD-10-PCS | Mod: CPTII,S$GLB,, | Performed by: INTERNAL MEDICINE

## 2023-07-25 PROCEDURE — 3079F PR MOST RECENT DIASTOLIC BLOOD PRESSURE 80-89 MM HG: ICD-10-PCS | Mod: CPTII,S$GLB,, | Performed by: INTERNAL MEDICINE

## 2023-07-25 PROCEDURE — 3077F SYST BP >= 140 MM HG: CPT | Mod: CPTII,S$GLB,, | Performed by: INTERNAL MEDICINE

## 2023-07-25 PROCEDURE — 93000 ELECTROCARDIOGRAM COMPLETE: CPT | Mod: S$GLB,,, | Performed by: INTERNAL MEDICINE

## 2023-07-25 PROCEDURE — 3077F PR MOST RECENT SYSTOLIC BLOOD PRESSURE >= 140 MM HG: ICD-10-PCS | Mod: CPTII,S$GLB,, | Performed by: INTERNAL MEDICINE

## 2023-07-25 PROCEDURE — 99214 PR OFFICE/OUTPT VISIT, EST, LEVL IV, 30-39 MIN: ICD-10-PCS | Mod: 25,S$GLB,, | Performed by: INTERNAL MEDICINE

## 2023-07-25 PROCEDURE — 1159F MED LIST DOCD IN RCRD: CPT | Mod: CPTII,S$GLB,, | Performed by: INTERNAL MEDICINE

## 2023-07-25 PROCEDURE — 3008F BODY MASS INDEX DOCD: CPT | Mod: CPTII,S$GLB,, | Performed by: INTERNAL MEDICINE

## 2023-07-25 PROCEDURE — 3079F DIAST BP 80-89 MM HG: CPT | Mod: CPTII,S$GLB,, | Performed by: INTERNAL MEDICINE

## 2023-07-25 PROCEDURE — 1159F PR MEDICATION LIST DOCUMENTED IN MEDICAL RECORD: ICD-10-PCS | Mod: CPTII,S$GLB,, | Performed by: INTERNAL MEDICINE

## 2023-07-25 PROCEDURE — 99999 PR PBB SHADOW E&M-EST. PATIENT-LVL III: CPT | Mod: PBBFAC,,, | Performed by: INTERNAL MEDICINE

## 2023-07-25 RX ORDER — CARVEDILOL 6.25 MG/1
6.25 TABLET ORAL EVERY 12 HOURS
Qty: 60 TABLET | Refills: 11
Start: 2023-07-25 | End: 2024-01-09 | Stop reason: SDUPTHER

## 2023-07-25 NOTE — PROGRESS NOTES
Subjective:      Patient ID: Fan Chris is a 63 y.o. male.    Chief Complaint: Follow-up    HPI:  Boxes 3 days a week .  Works out once a week.  Walks    Review of Systems   Cardiovascular:  Negative for chest pain, claudication, dyspnea on exertion, irregular heartbeat, leg swelling, near-syncope, orthopnea, palpitations and syncope.        Pt reports feeling a little dizzy when he exercises when taking the Entresto.    Pt reports noncompliance with meds      Past Medical History:   Diagnosis Date    Asthma     Bilateral renal cysts     Bronchitis     Cardiomyopathy     CHF (congestive heart failure)     CKD (chronic kidney disease) stage 3, GFR 30-59 ml/min     Hyperlipidemia 10/14/2016    Hypertension     Low testosterone level in male     Sinusitis     Sleep apnea         Past Surgical History:   Procedure Laterality Date    arm fracture      EYE SURGERY      TONSILLECTOMY         Family History   Problem Relation Age of Onset    Lung cancer Mother     Cancer Mother 61        lung    Hypertension Father     Hyperlipidemia Father     Heart disease Paternal Grandmother        Social History     Socioeconomic History    Marital status: Single   Tobacco Use    Smoking status: Former     Packs/day: 1.00     Years: 8.00     Pack years: 8.00     Types: Cigarettes     Quit date: 10/4/1984     Years since quittin.8    Smokeless tobacco: Never   Substance and Sexual Activity    Alcohol use: Not Currently     Alcohol/week: 1.0 standard drink     Types: 1 Cans of beer per week    Drug use: No    Sexual activity: Never     Partners: Female   Social History Narrative    Lives alone with his dog. Generally eats outside and indulges in sweets and eats large portions of food. Drinks juice and sweet tea. Drinks 4-5 glasses of water.      Social Determinants of Health     Financial Resource Strain: Low Risk     Difficulty of Paying Living Expenses: Not hard at all   Food Insecurity: No Food Insecurity    Worried About  Running Out of Food in the Last Year: Never true    Ran Out of Food in the Last Year: Never true   Transportation Needs: No Transportation Needs    Lack of Transportation (Medical): No    Lack of Transportation (Non-Medical): No   Physical Activity: Insufficiently Active    Days of Exercise per Week: 3 days    Minutes of Exercise per Session: 40 min   Stress: No Stress Concern Present    Feeling of Stress : Only a little   Social Connections: Unknown    Frequency of Communication with Friends and Family: More than three times a week    Frequency of Social Gatherings with Friends and Family: More than three times a week    Active Member of Clubs or Organizations: Yes    Attends Club or Organization Meetings: More than 4 times per year    Marital Status:    Housing Stability: Low Risk     Unable to Pay for Housing in the Last Year: No    Number of Places Lived in the Last Year: 1    Unstable Housing in the Last Year: No       Current Outpatient Medications on File Prior to Visit   Medication Sig Dispense Refill    ASA/acetaminophen/caffeine/pot (GOODY'S HEADACHE POWDER ORAL) Take by mouth.      aspirin (ECOTRIN) 81 MG EC tablet Take 1 tablet (81 mg total) by mouth once daily. 30 tablet 11    atorvastatin (LIPITOR) 40 MG tablet Take 1 tablet (40 mg total) by mouth once daily. 90 tablet 3    fluticasone propionate (FLONASE) 50 mcg/actuation nasal spray 2 sprays by Each Nostril route as needed.      omega-3 fatty acids (FISH OIL CONCENTRATE ORAL) Take by mouth.      sacubitriL-valsartan (ENTRESTO) 49-51 mg per tablet Take 1 tablet by mouth 2 (two) times daily. 180 tablet 3    TESTOSTERONE CYPIONATE IM Inject into the muscle. Twice weekly      [DISCONTINUED] carvediloL (COREG) 6.25 MG tablet Take 1 tablet (6.25 mg total) by mouth every 12 (twelve) hours. 60 tablet 11     No current facility-administered medications on file prior to visit.       Review of patient's allergies indicates:  No Known  "Allergies  Objective:     Vitals:    07/25/23 0912   BP: (!) 146/82   BP Location: Right arm   Patient Position: Sitting   BP Method: Large (Automatic)   Pulse: 78   Weight: 94.4 kg (208 lb 1.8 oz)   Height: 5' 9" (1.753 m)        Physical Exam  Constitutional:       General: He is not in acute distress.     Appearance: He is well-developed. He is not diaphoretic.   Eyes:      General: No scleral icterus.  Neck:      Vascular: No carotid bruit or JVD.   Cardiovascular:      Rate and Rhythm: Regular rhythm.      Heart sounds: Normal heart sounds. No murmur heard.    No friction rub. No gallop.   Pulmonary:      Effort: Pulmonary effort is normal. No respiratory distress.      Breath sounds: Normal breath sounds.   Musculoskeletal:      Right lower leg: No edema.      Left lower leg: No edema.   Skin:     General: Skin is warm and dry.   Neurological:      Mental Status: He is alert and oriented to person, place, and time.   Psychiatric:         Behavior: Behavior normal.         Thought Content: Thought content normal.         Judgment: Judgment normal.      ECG today:  NSR, leftward axis, nonspecific T wave abnormality, reviewed by me, [facility]      Hospital Outpatient Visit on 07/20/2023   Component Date Value Ref Range Status    BSA 07/20/2023 2.14  m2 Final    TDI SEPTAL 07/20/2023 0.05  m/s Final    LV LATERAL E/E' RATIO 07/20/2023 13.20  m/s Final    LV SEPTAL E/E' RATIO 07/20/2023 13.20  m/s Final    LA WIDTH 07/20/2023 3.50  cm Final    IVC diameter 07/20/2023 0.37  cm Final    Left Ventricular Outflow Tract Amy* 07/20/2023 0.66  cm/s Final    Left Ventricular Outflow Tract May* 07/20/2023 1.79  mmHg Final    Pulmonary Valve Mean Velocity 07/20/2023 0.59  m/s Final    TDI LATERAL 07/20/2023 0.05  m/s Final    PV PEAK VELOCITY 07/20/2023 0.84  cm/s Final    LVIDd 07/20/2023 5.78  3.5 - 6.0 cm Final    IVS 07/20/2023 1.15 (A)  0.6 - 1.1 cm Final    Posterior Wall 07/20/2023 1.25 (A)  0.6 - 1.1 cm Final    " "LVIDs 07/20/2023 4.64 (A)  2.1 - 4.0 cm Final    FS 07/20/2023 20  28 - 44 % Final    LA volume 07/20/2023 73.58  cm3 Final    STJ 07/20/2023 2.85  cm Final    Ascending aorta 07/20/2023 2.94  cm Final    LV mass 07/20/2023 295.33  g Final    LA size 07/20/2023 4.64  cm Final    RVDD 07/20/2023 2.70  cm Final    RV S' 07/20/2023 7.91  cm/s Final    Left Ventricle Relative Wall Thick* 07/20/2023 0.43  cm Final    AV regurgitation pressure 1/2 time 07/20/2023 641.614897645336905  ms Final    MV mean gradient 07/20/2023 1  mmHg Final    MV valve area by continuity eq 07/20/2023 5.64  cm2 Final    E/A ratio 07/20/2023 0.65   Final    Mean e' 07/20/2023 0.05  m/s Final    E wave deceleration time 07/20/2023 171.95  msec Final    MV "A" wave duration 07/20/2023 114.200831587963404  msec Final    Pulm vein S/D ratio 07/20/2023 1.37   Final    LVOT diameter 07/20/2023 2.51  cm Final    LVOT area 07/20/2023 4.9  cm2 Final    LVOT peak teddy 07/20/2023 0.77  m/s Final    LVOT peak VTI 07/20/2023 19.60  cm Final    Mr max etddy 07/20/2023 5.47  m/s Final    LVOT stroke volume 07/20/2023 96.93  cm3 Final    MV peak gradient 07/20/2023 4  mmHg Final    E/E' ratio 07/20/2023 13.20  m/s Final    MV Peak E Teddy 07/20/2023 0.66  m/s Final    AR Max Teddy 07/20/2023 2.90  m/s Final    TR Max Teddy 07/20/2023 2.24  m/s Final    MV VTI 07/20/2023 17.2  cm Final    MV Peak A Teddy 07/20/2023 1.01  m/s Final    PV Peak S Teddy 07/20/2023 0.48  m/s Final    PV Peak D Tedyd 07/20/2023 0.35  m/s Final    LV Systolic Volume 07/20/2023 99.14  mL Final    LV Systolic Volume Index 07/20/2023 47.2  mL/m2 Final    LV Diastolic Volume 07/20/2023 165.03  mL Final    LV Diastolic Volume Index 07/20/2023 78.59  mL/m2 Final    LA Volume Index 07/20/2023 35.0  mL/m2 Final    LV Mass Index 07/20/2023 141  g/m2 Final    RA Major [place] 07/20/2023 4.13  cm Final    Left Atrium Minor Axis 07/20/2023 5.33  cm Final    Left Atrium Major Axis 07/20/2023 5.33  cm Final    " Triscuspid Valve Regurgitation Pea* 07/20/2023 20  mmHg Final    LA Volume Index (Mod) 07/20/2023 21.9  mL/m2 Final    LA volume (mod) 07/20/2023 45.90  cm3 Final    RA Width 07/20/2023 3.70  cm Final    Elizondo's Biplane MOD Ejection Fra* 07/20/2023 4  % Final    EF 07/20/2023 40  % Final   Lab Visit on 07/20/2023   Component Date Value Ref Range Status    WBC 07/20/2023 4.81  3.90 - 12.70 K/uL Final    RBC 07/20/2023 5.88  4.60 - 6.20 M/uL Final    Hemoglobin 07/20/2023 16.8  14.0 - 18.0 g/dL Final    Hematocrit 07/20/2023 50.2  40.0 - 54.0 % Final    MCV 07/20/2023 85  82 - 98 fL Final    MCH 07/20/2023 28.6  27.0 - 31.0 pg Final    MCHC 07/20/2023 33.5  32.0 - 36.0 g/dL Final    RDW 07/20/2023 14.7 (H)  11.5 - 14.5 % Final    Platelets 07/20/2023 370  150 - 450 K/uL Final    MPV 07/20/2023 8.1 (L)  9.2 - 12.9 fL Final    Immature Granulocytes 07/20/2023 0.4  0.0 - 0.5 % Final    Gran # (ANC) 07/20/2023 2.8  1.8 - 7.7 K/uL Final    Immature Grans (Abs) 07/20/2023 0.02  0.00 - 0.04 K/uL Final    Lymph # 07/20/2023 1.3  1.0 - 4.8 K/uL Final    Mono # 07/20/2023 0.5  0.3 - 1.0 K/uL Final    Eos # 07/20/2023 0.2  0.0 - 0.5 K/uL Final    Baso # 07/20/2023 0.07  0.00 - 0.20 K/uL Final    nRBC 07/20/2023 0  0 /100 WBC Final    Gran % 07/20/2023 57.1  38.0 - 73.0 % Final    Lymph % 07/20/2023 26.8  18.0 - 48.0 % Final    Mono % 07/20/2023 9.6  4.0 - 15.0 % Final    Eosinophil % 07/20/2023 4.6  0.0 - 8.0 % Final    Basophil % 07/20/2023 1.5  0.0 - 1.9 % Final    Differential Method 07/20/2023 Automated   Final    CPK 07/20/2023 132  20 - 200 U/L Final    Sodium 07/20/2023 136  136 - 145 mmol/L Final    Potassium 07/20/2023 3.7  3.5 - 5.1 mmol/L Final    Chloride 07/20/2023 101  95 - 110 mmol/L Final    CO2 07/20/2023 23  23 - 29 mmol/L Final    Glucose 07/20/2023 106  70 - 110 mg/dL Final    BUN 07/20/2023 21  8 - 23 mg/dL Final    Creatinine 07/20/2023 1.6 (H)  0.5 - 1.4 mg/dL Final    Calcium 07/20/2023 9.1  8.7 - 10.5  mg/dL Final    Total Protein 07/20/2023 7.1  6.0 - 8.4 g/dL Final    Albumin 07/20/2023 4.2  3.5 - 5.2 g/dL Final    Total Bilirubin 07/20/2023 1.2 (H)  0.1 - 1.0 mg/dL Final    Alkaline Phosphatase 07/20/2023 57  55 - 135 U/L Final    AST 07/20/2023 20  10 - 40 U/L Final    ALT 07/20/2023 18  10 - 44 U/L Final    eGFR 07/20/2023 49 (A)  >60 mL/min/1.73 m^2 Final    Anion Gap 07/20/2023 12  8 - 16 mmol/L Final    Cholesterol 07/20/2023 249 (H)  120 - 199 mg/dL Final    Triglycerides 07/20/2023 182 (H)  30 - 150 mg/dL Final    HDL 07/20/2023 34 (L)  40 - 75 mg/dL Final    LDL Cholesterol 07/20/2023 178.6 (H)  63.0 - 159.0 mg/dL Final    HDL/Cholesterol Ratio 07/20/2023 13.7 (L)  20.0 - 50.0 % Final    Total Cholesterol/HDL Ratio 07/20/2023 7.3 (H)  2.0 - 5.0 Final    Non-HDL Cholesterol 07/20/2023 215  mg/dL Final    TSH 07/20/2023 1.871  0.400 - 4.000 uIU/mL Final   Lab Visit on 07/05/2023   Component Date Value Ref Range Status    PSA, Screen 07/05/2023 1.4  0.00 - 4.00 ng/mL Final    Sodium 07/05/2023 140  136 - 145 mmol/L Final    Potassium 07/05/2023 4.8  3.5 - 5.1 mmol/L Final    Chloride 07/05/2023 103  95 - 110 mmol/L Final    CO2 07/05/2023 30 (H)  23 - 29 mmol/L Final    Glucose 07/05/2023 108  70 - 110 mg/dL Final    BUN 07/05/2023 17  8 - 23 mg/dL Final    Creatinine 07/05/2023 1.5 (H)  0.5 - 1.4 mg/dL Final    Calcium 07/05/2023 9.6  8.7 - 10.5 mg/dL Final    Total Protein 07/05/2023 7.0  6.0 - 8.4 g/dL Final    Albumin 07/05/2023 4.1  3.5 - 5.2 g/dL Final    Total Bilirubin 07/05/2023 0.8  0.1 - 1.0 mg/dL Final    Alkaline Phosphatase 07/05/2023 63  55 - 135 U/L Final    AST 07/05/2023 16  10 - 40 U/L Final    ALT 07/05/2023 15  10 - 44 U/L Final    eGFR 07/05/2023 52.6 (A)  >60 mL/min/1.73 m^2 Final    Anion Gap 07/05/2023 7 (L)  8 - 16 mmol/L Final   Hospital Outpatient Visit on 02/28/2023   Component Date Value Ref Range Status    BSA 02/28/2023 2.15  m2 Final    TDI SEPTAL 02/28/2023 0.04  m/s Final  "   LV LATERAL E/E' RATIO 02/28/2023 22.00  m/s Final    LV SEPTAL E/E' RATIO 02/28/2023 11.00  m/s Final    LA WIDTH 02/28/2023 3.70  cm Final    IVC diameter 02/28/2023 10  cm Final    Left Ventricular Outflow Tract Amy* 02/28/2023 0.52  cm/s Final    Left Ventricular Outflow Tract Amy* 02/28/2023 1.16  mmHg Final    Pulmonary Valve Mean Velocity 02/28/2023 0.78  m/s Final    TDI LATERAL 02/28/2023 0.02  m/s Final    PV PEAK VELOCITY 02/28/2023 1.00  cm/s Final    LVIDd 02/28/2023 5.29  3.5 - 6.0 cm Final    IVS 02/28/2023 1.62 (A)  0.6 - 1.1 cm Final    Posterior Wall 02/28/2023 1.49 (A)  0.6 - 1.1 cm Final    LVIDs 02/28/2023 4.30 (A)  2.1 - 4.0 cm Final    FS 02/28/2023 19  28 - 44 % Final    LA volume 02/28/2023 56.11  cm3 Final    STJ 02/28/2023 3.08  cm Final    Ascending aorta 02/28/2023 3.67  cm Final    LV mass 02/28/2023 370.56  g Final    LA size 02/28/2023 4.00  cm Final    RVDD 02/28/2023 2.60  cm Final    RV S' 02/28/2023 0.01  cm/s Final    Left Ventricle Relative Wall Thick* 02/28/2023 0.56  cm Final    AV regurgitation pressure 1/2 time 02/28/2023 577.465263141304178  ms Final    AV mean gradient 02/28/2023 4  mmHg Final    AV valve area 02/28/2023 2.92  cm2 Final    AV Velocity Ratio 02/28/2023 0.61   Final    AV index (prosthetic) 02/28/2023 0.64   Final    MV mean gradient 02/28/2023 1  mmHg Final    MV valve area p 1/2 method 02/28/2023 4.31  cm2 Final    MV valve area by continuity eq 02/28/2023 3.76  cm2 Final    E/A ratio 02/28/2023 0.60   Final    Mean e' 02/28/2023 0.03  m/s Final    E wave deceleration time 02/28/2023 175.82  msec Final    MV "A" wave duration 02/28/2023 128.49512029128578  msec Final    Pulm vein S/D ratio 02/28/2023 1.06   Final    LVOT diameter 02/28/2023 2.41  cm Final    LVOT area 02/28/2023 4.6  cm2 Final    LVOT peak sonia 02/28/2023 0.68  m/s Final    LVOT peak VTI 02/28/2023 12.70  cm Final    Ao peak sonia 02/28/2023 1.11  m/s Final    Ao VTI 02/28/2023 19.8  cm " Final    Mr max teddy 02/28/2023 5.39  m/s Final    LVOT stroke volume 02/28/2023 57.90  cm3 Final    AV peak gradient 02/28/2023 5  mmHg Final    MV peak gradient 02/28/2023 2  mmHg Final    E/E' ratio 02/28/2023 14.67  m/s Final    MV Peak E Teddy 02/28/2023 0.44  m/s Final    AR Max Teddy 02/28/2023 3.35  m/s Final    TR Max Teddy 02/28/2023 1.86  m/s Final    MV VTI 02/28/2023 15.4  cm Final    MV stenosis pressure 1/2 time 02/28/2023 50.99  ms Final    MV Peak A Teddy 02/28/2023 0.73  m/s Final    PV Peak S Teddy 02/28/2023 0.36  m/s Final    PV Peak D Teddy 02/28/2023 0.34  m/s Final    LV Systolic Volume 02/28/2023 82.90  mL Final    LV Systolic Volume Index 02/28/2023 39.3  mL/m2 Final    LV Diastolic Volume 02/28/2023 134.67  mL Final    LV Diastolic Volume Index 02/28/2023 63.82  mL/m2 Final    LA Volume Index 02/28/2023 26.6  mL/m2 Final    LV Mass Index 02/28/2023 176  g/m2 Final    RA Major Axis 02/28/2023 4.42  cm Final    Left Atrium Minor Axis 02/28/2023 4.89  cm Final    Left Atrium Major Axis 02/28/2023 4.10  cm Final    Triscuspid Valve Regurgitation Pea* 02/28/2023 14  mmHg Final    LA Volume Index (Mod) 02/28/2023 26.8  mL/m2 Final    LA volume (mod) 02/28/2023 56.65  cm3 Final    RA Width 02/28/2023 4.20  cm Final    Right Atrial Pressure (from IVC) 02/28/2023 3  mmHg Final    EF 02/28/2023 40  % Final    TV rest pulmonary artery pressure 02/28/2023 17  mmHg Final   Lab Visit on 02/28/2023   Component Date Value Ref Range Status    WBC 02/28/2023 5.16  3.90 - 12.70 K/uL Final    RBC 02/28/2023 5.89  4.60 - 6.20 M/uL Final    Hemoglobin 02/28/2023 17.2  14.0 - 18.0 g/dL Final    Hematocrit 02/28/2023 51.5  40.0 - 54.0 % Final    MCV 02/28/2023 87  82 - 98 fL Final    MCH 02/28/2023 29.2  27.0 - 31.0 pg Final    MCHC 02/28/2023 33.4  32.0 - 36.0 g/dL Final    RDW 02/28/2023 13.4  11.5 - 14.5 % Final    Platelets 02/28/2023 430  150 - 450 K/uL Final    MPV 02/28/2023 8.3 (L)  9.2 - 12.9 fL Final    Immature  Granulocytes 02/28/2023 0.4  0.0 - 0.5 % Final    Gran # (ANC) 02/28/2023 3.0  1.8 - 7.7 K/uL Final    Immature Grans (Abs) 02/28/2023 0.02  0.00 - 0.04 K/uL Final    Lymph # 02/28/2023 1.3  1.0 - 4.8 K/uL Final    Mono # 02/28/2023 0.5  0.3 - 1.0 K/uL Final    Eos # 02/28/2023 0.3  0.0 - 0.5 K/uL Final    Baso # 02/28/2023 0.06  0.00 - 0.20 K/uL Final    nRBC 02/28/2023 0  0 /100 WBC Final    Gran % 02/28/2023 57.3  38.0 - 73.0 % Final    Lymph % 02/28/2023 26.0  18.0 - 48.0 % Final    Mono % 02/28/2023 9.9  4.0 - 15.0 % Final    Eosinophil % 02/28/2023 5.2  0.0 - 8.0 % Final    Basophil % 02/28/2023 1.2  0.0 - 1.9 % Final    Differential Method 02/28/2023 Automated   Final    Sodium 02/28/2023 138  136 - 145 mmol/L Final    Potassium 02/28/2023 4.5  3.5 - 5.1 mmol/L Final    Chloride 02/28/2023 101  95 - 110 mmol/L Final    CO2 02/28/2023 30 (H)  23 - 29 mmol/L Final    Glucose 02/28/2023 111 (H)  70 - 110 mg/dL Final    BUN 02/28/2023 17  8 - 23 mg/dL Final    Creatinine 02/28/2023 1.6 (H)  0.5 - 1.4 mg/dL Final    Calcium 02/28/2023 9.1  8.7 - 10.5 mg/dL Final    Total Protein 02/28/2023 7.1  6.0 - 8.4 g/dL Final    Albumin 02/28/2023 4.1  3.5 - 5.2 g/dL Final    Total Bilirubin 02/28/2023 1.1 (H)  0.1 - 1.0 mg/dL Final    Alkaline Phosphatase 02/28/2023 58  55 - 135 U/L Final    AST 02/28/2023 21  10 - 40 U/L Final    ALT 02/28/2023 22  10 - 44 U/L Final    Anion Gap 02/28/2023 7 (L)  8 - 16 mmol/L Final    eGFR 02/28/2023 49 (A)  >60 mL/min/1.73 m^2 Final    Cholesterol 02/28/2023 271 (H)  120 - 199 mg/dL Final    Triglycerides 02/28/2023 124  30 - 150 mg/dL Final    HDL 02/28/2023 34 (L)  40 - 75 mg/dL Final    LDL Cholesterol 02/28/2023 212.2 (H)  63.0 - 159.0 mg/dL Final    HDL/Cholesterol Ratio 02/28/2023 12.5 (L)  20.0 - 50.0 % Final    Total Cholesterol/HDL Ratio 02/28/2023 8.0 (H)  2.0 - 5.0 Final    Non-HDL Cholesterol 02/28/2023 237  mg/dL Final    TSH 02/28/2023 1.696  0.400 - 4.000 uIU/mL Final  "  (  CT Chest Without Contrast  Status: Final result     MyChart Results Release    [employer] Status: Active  Results Release     PACS Images for [employer] Viewer     Show images for CT Chest Without Contrast  All Reviewers List    Chiki Mcdaniel MD on 8/30/2022 15:04     CT Chest Without Contrast  Order: [id number]  Status: Final result      Visible to patient: Yes (seen)       Next appt: 01/02/2024 at 08:00 AM in Lab ([facility])       Dx: Solitary pulmonary nodule       0 Result Notes      Details    Reading Physician Reading Date Result Priority   Marni Martini MD  [phone] 8/30/2022 Routine     Narrative & Impression  EXAMINATION:  CT CHEST WITHOUT CONTRAST     CLINICAL HISTORY:  "Abnormal xray - lung nodule, < 1 cm, mod-high risk;"     COMPARISON:  08/22/2022 radiographs     TECHNIQUE:  Volumetric data acquisition of the chest from the lung apices to the adrenals was obtained without intravenous contrast. Sagittal and coronal multiplanar reconstructions were performed. Lack of IV contrast material limits the assessment of mediastinal and abdominal structures.     FINDINGS:  Lungs and large airways: Trachea and proximal airways are patent.     Few small pulmonary nodules scattered throughout both lung fields, the largest is located in the lingula, and correspond to the finding on the chest radiograph 08/22/2022, a small solid nodule with central calcification measures 1.0 cm series 4, image 246, it is favored to be benign.  There are few other smaller calcified benign fully calcified granulomas.  No acute focal area of airspace consolidation.  No bronchiectasis.  No significant reticulation or fibrosis.     Pleura: No pleural effusion or thickening.     Heart and pericardium: Heart size is normal. No pericardial effusion.  Mild calcified atherosclerosis of the coronaries.     Mediastinum and kinsey: No lymphadenopathy.     Chest wall and lower neck: Unremarkable.     Vessels: The thoracic " aorta is of normal caliber and demonstrates very minimal atherosclerotic plaque. Enlarged azygous vein. The intrahepatic IVC is not well visualized.  Few collateral vessels noted in the right upper abdominal quadrant.     Bones: Mild age related degenerative changes.  No acute fracture.  No suspicious lytic or sclerotic lesion.     Upper abdomen:  Partially visualized hyperattenuating lesion of the right kidney suggestive a hemorrhagic cyst measuring 1.9 cm. There is a hypoattenuating lesion at the upper pole of the right kidney measuring 2.0 cm with low-attenuation suggestive of a cyst.     Impression:     Partially calcified pulmonary nodule within the lingular region corresponds to what was seen on radiograph 08/22/2022 exam.  There are few scattered pulmonary nodule bilaterally, For multiple solid nodules with any 6 mm or greater, Fleischner Society guidelines recommend follow up with non-contrast chest CT at 3-6 months and 18-24 months after discovery.     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     Right kidney lesion, one appear to represent a hemorrhagic cyst and one possible simple cyst, these could be better evaluated with ultrasound if clinically warranted.        Electronically signed by: Marni Martini MD  Date:                                            08/30/2022  Time:                                           09:52     Accession #: [id number]  Transthoracic echo (TTE) complete  Order# [id number]  Reading physician: Chiki Mcdaniel MD Ordering physician: Chiki Mcdaniel MD Study date: 7/20/23     Reason for Exam  Priority: Routine  Dx: Dilated cardiomyopathy [I42.0 (ICD-10-CM)]; Chronic combined systolic and diastolic congestive heart failure [I50.42 (ICD-10-CM)]; Mixed hyperlipidemia [E78.2 (ICD-10-CM)];  Nonrheumatic mitral valve regurgitation [I34.0 (ICD-10-CM)]; Chronic kidney disease, stage 3a [N18.31 (ICD-10-CM)]     View Images Vital Vitrea     Show images for Echo Saline Bubble? No  Summary    The left ventricle is mildly enlarged with mild concentric hypertrophy and mildly decreased systolic function.  Moderate left atrial enlargement.  The estimated ejection fraction is 40%.  Grade I left ventricular diastolic dysfunction.  Normal right ventricular size with normal right ventricular systolic function.  Mild tricuspid regurgitation.  Mild aortic regurgitation.   counter Form Printed    Encounter form printed on 08/22/22 06:54 AM         NM Myocardial Perfusion Spect Multi Exer  Status: Final result     MyChart Results Release    [employer] Status: Active  Results Release     PACS Images for [employer] Viewer     Show images for NM Myocardial Perfusion Spect Multi Exer  All Reviewers List    Chiki Mcdaniel MD on 8/23/2022 12:08      Contains abnormal data NM Myocardial Perfusion Spect Multi Exer  Order: [id number]  Status: Final result      Visible to patient: Yes (seen)       Next appt: 01/02/2024 at 08:00 AM in Lab ([facility])       Dx: Mixed hyperlipidemia; Nonspecific abn...       0 Result Notes      Details    Reading Physician Reading Date Result Priority   Darrel Carmona MD  [phone] 8/22/2022 Routine   Stacy Tom MD  [phone] 8/22/2022      Narrative & Impression  EXAMINATION:  NM MYOCARDIAL PERFUSION SPECT MULTI STUDY     CLINICAL HISTORY:  Heart failure, known or suspected, initial workup;abnormal ECG;  Cardiomyopathy, unspecified     TECHNIQUE:  SPECT images in short, vertical and horizontal long axis were acquired after the injection of 10.6 mCi of Tc-99m Myoview at rest and 10.6 mCi during a cardiac stress. The clinical stress and ECG portion of the study is to be read separately.     CT was performed for attenuation correction.     COMPARISON:  Chest x-ray of  today's date and myocardial perfusion 10/07/2016.     FINDINGS:  The quality of the study is good..     Stress SPECT images demonstrate diaphragmatic attenuation of the inferior wall which improves with attenuation correction.  On the resting images, there is matching inferior wall attenuation that improves with correction     The gated post-stress images reveal impaired wall motion and thickening with an estimated LVEF of 29 %. The LV cavity is dilated with an end-diastolic volume of 237 ml and an end-systolic volume of 170 ml.     CT is notable for an 8 mm left upper lobe nodule.     Impression:     1. No scintigraphic evidence of ischemia or scar..  2. The global left ventricular systolic function is diminished with an LV ejection fraction of 29 % and dilation of the left ventricle.  Wall motion is globally hypokinetic.  3. Incidental 8 mm left upper lobe nodule corresponding with finding on today's chest x-ray.  The lungs are incompletely imaged by attenuation correction CT, and further evaluation with dedicated chest CT is recommended in this former smoker.  This report was flagged in Epic as containing an incidental finding.     I, Darrel Carmona MD, attest that I reviewed and interpreted the images.     Electronically signed by resident: Stacy Tom  Date:                                            08/22/2022  Time:                                           16:06     Electronically signed by: Darrel Carmona  Date:                                            08/22/2022  Time:                                           17:49               Exam Ended: 08/22/22 09:28           Assessment:     1. Cardiomyopathy, unspecified type    2. Chronic combined systolic and diastolic congestive heart failure    3. Essential hypertension    4. Mixed hyperlipidemia    5. Nonrheumatic mitral valve regurgitation    6. JACLYN (obstructive sleep apnea)    7. Solitary pulmonary nodule    8. Stage 3 chronic kidney disease, unspecified whether  stage 3a or 3b CKD    9. Noncompliance      Plan:   Fan was seen today for follow-up.    Diagnoses and all orders for this visit:    Cardiomyopathy, unspecified type  -     IN OFFICE EKG 12-LEAD (to [place])  -     Echo Saline Bubble? No  -     CBC Auto Differential; Future  -     BNP; Future  -     Comprehensive Metabolic Panel; Future  -     CK; Future  -     Lipid Panel; Future  -     TSH; Future    Chronic combined systolic and diastolic congestive heart failure  -     IN OFFICE EKG 12-LEAD (to [place])  -     Echo Saline Bubble? No  -     CBC Auto Differential; Future  -     BNP; Future  -     Comprehensive Metabolic Panel; Future  -     CK; Future  -     Lipid Panel; Future  -     TSH; Future    Essential hypertension  -     IN OFFICE EKG 12-LEAD (to [place])  -     Echo Saline Bubble? No  -     CBC Auto Differential; Future  -     BNP; Future  -     Comprehensive Metabolic Panel; Future  -     CK; Future  -     Lipid Panel; Future  -     TSH; Future    Mixed hyperlipidemia  -     IN OFFICE EKG 12-LEAD (to [place])  -     Echo Saline Bubble? No  -     CBC Auto Differential; Future  -     BNP; Future  -     Comprehensive Metabolic Panel; Future  -     CK; Future  -     Lipid Panel; Future  -     TSH; Future    Nonrheumatic mitral valve regurgitation  -     IN OFFICE EKG 12-LEAD (to [place])  -     Echo Saline Bubble? No  -     CBC Auto Differential; Future  -     BNP; Future  -     Comprehensive Metabolic Panel; Future  -     CK; Future  -     Lipid Panel; Future  -     TSH; Future    JACLYN (obstructive sleep apnea)  -     IN OFFICE EKG 12-LEAD (to [place])  -     Echo Saline Bubble? No  -     CBC Auto Differential; Future  -     BNP; Future  -     Comprehensive Metabolic Panel; Future  -     CK; Future  -     Lipid Panel; Future  -     TSH; Future    Solitary pulmonary nodule  -     IN OFFICE EKG 12-LEAD (to [place])  -     CT Chest Without Contrast; Future  -     Echo Saline Bubble? No  -     CBC Auto Differential; Future  -      BNP; Future  -     Comprehensive Metabolic Panel; Future  -     CK; Future  -     Lipid Panel; Future  -     TSH; Future    Stage 3 chronic kidney disease, unspecified whether stage 3a or 3b CKD  -     Echo Saline Bubble? No  -     CBC Auto Differential; Future  -     BNP; Future  -     Comprehensive Metabolic Panel; Future  -     CK; Future  -     Lipid Panel; Future  -     TSH; Future    Noncompliance  -     Echo Saline Bubble? No  -     CBC Auto Differential; Future  -     BNP; Future  -     Comprehensive Metabolic Panel; Future  -     CK; Future  -     Lipid Panel; Future  -     TSH; Future    Other orders  -     carvediloL (COREG) 6.25 MG tablet; Take 1 tablet (6.25 mg total) by mouth every 12 (twelve) hours.     Will repeat CT of chest to confirm stability of likely benign lung nodule    Long discussion with pt and wife regarding benefit of Entresto and carvedilol    Long discussion about compliance with meds    Follow up in about 6 months (around 1/25/2024).

## 2023-08-01 ENCOUNTER — TELEPHONE (OUTPATIENT)
Dept: CARDIOLOGY | Facility: CLINIC | Age: 63
End: 2023-08-01
Payer: COMMERCIAL

## 2023-08-01 ENCOUNTER — HOSPITAL ENCOUNTER (OUTPATIENT)
Dept: RADIOLOGY | Facility: HOSPITAL | Age: 63
Discharge: HOME | End: 2023-08-01
Attending: INTERNAL MEDICINE
Payer: COMMERCIAL

## 2023-08-01 DIAGNOSIS — R91.1 SOLITARY PULMONARY NODULE: ICD-10-CM

## 2023-08-01 PROCEDURE — 71250 CT THORAX DX C-: CPT | Mod: TC

## 2023-08-01 PROCEDURE — 71250 CT CHEST WITHOUT CONTRAST: ICD-10-PCS | Mod: 26,,, | Performed by: RADIOLOGY

## 2023-08-01 PROCEDURE — 71250 CT THORAX DX C-: CPT | Mod: 26,,, | Performed by: RADIOLOGY

## 2023-08-21 ENCOUNTER — PATIENT MESSAGE (OUTPATIENT)
Dept: INTERNAL MEDICINE | Facility: CLINIC | Age: 63
End: 2023-08-21
Payer: COMMERCIAL

## 2023-08-22 ENCOUNTER — TELEPHONE (OUTPATIENT)
Dept: INTERNAL MEDICINE | Facility: CLINIC | Age: 63
End: 2023-08-22
Payer: COMMERCIAL

## 2023-08-22 ENCOUNTER — E-VISIT (OUTPATIENT)
Dept: INTERNAL MEDICINE | Facility: CLINIC | Age: 63
End: 2023-08-22
Payer: COMMERCIAL

## 2023-08-22 DIAGNOSIS — U07.1 COVID-19: Primary | ICD-10-CM

## 2023-08-22 PROCEDURE — 99421 OL DIG E/M SVC 5-10 MIN: CPT | Mod: ,,, | Performed by: INTERNAL MEDICINE

## 2023-08-22 PROCEDURE — 99421 PR E&M, ONLINE DIGIT, EST, < 7 DAYS, 5-10 MINS: ICD-10-PCS | Mod: ,,, | Performed by: INTERNAL MEDICINE

## 2023-08-22 NOTE — PROGRESS NOTES
Patient ID: Fan Chris is a 63 y.o. male.    Chief Complaint: URI (Entered automatically based on patient selection in Patient Portal.)    The patient initiated a request through [facility] on 8/22/2023 for evaluation and management with a chief complaint of URI (Entered automatically based on patient selection in Patient Portal.)     I evaluated the questionnaire submission on 8/22/23    [facility]s Peq Evisit Upper Respitatory/Cough Questionnaire    8/22/2023  3:54 PM CDT - Filed by Patient   Do you agree to participate in an E-Visit? Yes   If you have any of the following symptoms, please present to your local ER or call 911:  I acknowledge   What is the main issue that you would like for your doctor to address today? Muscle fatigue some coughing with flem   Are you able to take your vital signs? No   What symptoms do you currently have?  Chills;  Cough;  Fatigue;  Headache;  Muscle or body aches   Describe your cough: Dry   Have you ever smoked? I have smoked in the past   Have you had a fever? Yes   What has been the range of your fever? Less than 100.4   When did your symptoms first appear? 8/20/2023   In the last two weeks, have you been in close contact with someone who has COVID-19 or the Flu? Yes, both   In the last two weeks, have you worked or volunteered in a healthcare facility or as a ? Healthcare facilities include a hospital, medical or dental clinic, long-term care facility, or nursing home No   Do you live in a long-term care facility, nursing home, group home, or homeless shelter? No   List what you have done or taken to help your symptoms. Zertec, goodies headache powder, zinc, b complex   How severe are your symptoms? Moderate   Have your symptoms improved since they first appeared? Better   Have you taken an at home Covid test? Yes   What were the results? Positive   Have you taken a Flu test? No   Have you been fully vaccinated for COVID? (2 Pfizer, 2 Moderna or 1 Xagenic Zach  vaccine injections) No   Have you received your first dose of the Pfizer or Moderna COVID vaccine? Yes   Have you recieved a Flu shot? No   Do you have transportation to get tested for COVID if it is indicated and ordered for you at an [facility] location? Yes   Provide any information you feel is important to your history not asked above My wife got the maderna booster Wednesday felt bad for a day then Sunday i started to feel bad monday was a little worse than today   Please attach any relevant images or files          Recent Labs Obtained:  No visits with results within 7 Day(s) from this visit.   Latest known visit with results is:   Hospital Outpatient Visit on 07/20/2023   Component Date Value Ref Range Status    BSA 07/20/2023 2.14  m2 Final    TDI SEPTAL 07/20/2023 0.05  m/s Final    LV LATERAL E/E' RATIO 07/20/2023 13.20  m/s Final    LV SEPTAL E/E' RATIO 07/20/2023 13.20  m/s Final    LA WIDTH 07/20/2023 3.50  cm Final    IVC diameter 07/20/2023 0.37  cm Final    Left Ventricular Outflow Tract Amy* 07/20/2023 0.66  cm/s Final    Left Ventricular Outflow Tract Amy* 07/20/2023 1.79  mmHg Final    Pulmonary Valve Mean Velocity 07/20/2023 0.59  m/s Final    TDI LATERAL 07/20/2023 0.05  m/s Final    PV PEAK VELOCITY 07/20/2023 0.84  cm/s Final    LVIDd 07/20/2023 5.78  3.5 - 6.0 cm Final    IVS 07/20/2023 1.15 (A)  0.6 - 1.1 cm Final    Posterior Wall 07/20/2023 1.25 (A)  0.6 - 1.1 cm Final    LVIDs 07/20/2023 4.64 (A)  2.1 - 4.0 cm Final    FS 07/20/2023 20  28 - 44 % Final    LA volume 07/20/2023 73.58  cm3 Final    STJ 07/20/2023 2.85  cm Final    Ascending aorta 07/20/2023 2.94  cm Final    LV mass 07/20/2023 295.33  g Final    LA size 07/20/2023 4.64  cm Final    RVDD 07/20/2023 2.70  cm Final    RV S' 07/20/2023 7.91  cm/s Final    Left Ventricle Relative Wall Thick* 07/20/2023 0.43  cm Final    AV regurgitation pressure 1/2 time 07/20/2023 641.649260626269102  ms Final    MV mean gradient 07/20/2023 1  mmHg  "Final    MV valve area by continuity eq 07/20/2023 5.64  cm2 Final    E/A ratio 07/20/2023 0.65   Final    Mean e' 07/20/2023 0.05  m/s Final    E wave deceleration time 07/20/2023 171.95  msec Final    MV "A" wave duration 07/20/2023 [id number]  msec Final    Pulm vein S/D ratio 07/20/2023 1.37   Final    LVOT diameter 07/20/2023 2.51  cm Final    LVOT area 07/20/2023 4.9  cm2 Final    LVOT peak teddy 07/20/2023 0.77  m/s Final    LVOT peak VTI 07/20/2023 19.60  cm Final    Mr max teddy 07/20/2023 5.47  m/s Final    LVOT stroke volume 07/20/2023 96.93  cm3 Final    MV peak gradient 07/20/2023 4  mmHg Final    E/E' ratio 07/20/2023 13.20  m/s Final    MV Peak E Teddy 07/20/2023 0.66  m/s Final    AR Max Teddy 07/20/2023 2.90  m/s Final    TR Max Teddy 07/20/2023 2.24  m/s Final    MV VTI 07/20/2023 17.2  cm Final    MV Peak A Teddy 07/20/2023 1.01  m/s Final    PV Peak S Teddy 07/20/2023 0.48  m/s Final    PV Peak D Teddy 07/20/2023 0.35  m/s Final    LV Systolic Volume 07/20/2023 99.14  mL Final    LV Systolic Volume Index 07/20/2023 47.2  mL/m2 Final    LV Diastolic Volume 07/20/2023 165.03  mL Final    LV Diastolic Volume Index 07/20/2023 78.59  mL/m2 Final    LA Volume Index 07/20/2023 35.0  mL/m2 Final    LV Mass Index 07/20/2023 141  g/m2 Final    RA Major [place] 07/20/2023 4.13  cm Final    Left Atrium Minor Axis 07/20/2023 5.33  cm Final    Left Atrium Major Axis 07/20/2023 5.33  cm Final    Triscuspid Valve Regurgitation Pea* 07/20/2023 20  mmHg Final    LA Volume Index (Mod) 07/20/2023 21.9  mL/m2 Final    LA volume (mod) 07/20/2023 45.90  cm3 Final    RA Width 07/20/2023 3.70  cm Final    Elizondo's Biplane MOD Ejection Fra* 07/20/2023 4  % Final    EF 07/20/2023 40  % Final       Encounter Diagnosis   Name Primary?    COVID-19 Yes        No orders of the defined types were placed in this encounter.     Medications Ordered This Encounter   Medications    nirmatrelvir-ritonavir 150-100 mg DsPk     Sig: Take 2 " tablets by mouth 2 (two) times daily. Take 2 tablets by mouth 2 (two) times daily for 5 days. Each dose contains 1 nirmatrelvir (pink tablet) and 1 ritonavir (white tablet). Take both tablets together for 5 days     Dispense:  20 tablet     Refill:  0        No follow-ups on file.      E-Visit Time Tracking:    Day 1 Time (in minutes): 5     Total Time (in minutes): 5

## 2023-10-01 ENCOUNTER — E-VISIT (OUTPATIENT)
Dept: INTERNAL MEDICINE | Facility: CLINIC | Age: 63
End: 2023-10-01
Payer: COMMERCIAL

## 2023-10-01 DIAGNOSIS — B96.89 ACUTE BACTERIAL SINUSITIS: Primary | ICD-10-CM

## 2023-10-01 DIAGNOSIS — J01.90 ACUTE BACTERIAL SINUSITIS: Primary | ICD-10-CM

## 2023-10-01 PROCEDURE — 99421 OL DIG E/M SVC 5-10 MIN: CPT | Mod: ,,, | Performed by: INTERNAL MEDICINE

## 2023-10-01 PROCEDURE — 99421 PR E&M, ONLINE DIGIT, EST, < 7 DAYS, 5-10 MINS: ICD-10-PCS | Mod: ,,, | Performed by: INTERNAL MEDICINE

## 2023-10-02 RX ORDER — METHYLPREDNISOLONE 4 MG/1
TABLET ORAL
Qty: 21 EACH | Refills: 0 | Status: SHIPPED | OUTPATIENT
Start: 2023-10-02 | End: 2023-10-23

## 2023-10-02 RX ORDER — AMOXICILLIN AND CLAVULANATE POTASSIUM 875; 125 MG/1; MG/1
1 TABLET, FILM COATED ORAL EVERY 12 HOURS
Qty: 14 TABLET | Refills: 0 | Status: SHIPPED | OUTPATIENT
Start: 2023-10-02 | End: 2023-10-09

## 2023-10-02 RX ORDER — FLUTICASONE PROPIONATE 50 MCG
2 SPRAY, SUSPENSION (ML) NASAL
Qty: 16 G | Refills: 11 | Status: SHIPPED | OUTPATIENT
Start: 2023-10-02

## 2023-10-02 NOTE — PROGRESS NOTES
Patient ID: Fan Chris is a 63 y.o. male.    Chief Complaint: URI (Entered automatically based on patient selection in Patient Portal.)    The patient initiated a request through [employer] on 10/1/2023 for evaluation and management with a chief complaint of URI (Entered automatically based on patient selection in Patient Portal.)     I evaluated the questionnaire submission on 10/2/23    Ohs Peq Evisit Upper Respitatory/Cough Questionnaire    10/1/2023  9:31 AM CDT - Filed by Patient   Do you agree to participate in an E-Visit? Yes   If you have any of the following symptoms, please present to your local ER or call 911:  I acknowledge   What is the main issue that you would like for your doctor to address today? Sinus infection for 8 days, continuous runny nose, coughing,   Are you able to take your vital signs? No   What symptoms do you currently have?  Cough;  Nasal Congestion;  Runny nose   Describe your cough: Productive (containing mucus)   Describe the mucus: Green;  Clear;  Thick   Have you ever smoked? I have never smoked   Have you had a fever? No   When did your symptoms first appear? 9/23/2023   In the last two weeks, have you been in close contact with someone who has COVID-19 or the Flu? No   In the last two weeks, have you worked or volunteered in a healthcare facility or as a ? Healthcare facilities include a hospital, medical or dental clinic, long-term care facility, or nursing home No   Do you live in a long-term care facility, nursing home, group home, or homeless shelter? No   List what you have done or taken to help your symptoms. Zertec, asprin   How severe are your symptoms? Moderate   Have your symptoms improved since they first appeared? No change   Have you taken an at home Covid test? Yes   What were the results? Negative   Have you taken a Flu test? No   Have you been fully vaccinated for COVID? (2 Pfizer, 2 Moderna or 1 Zach & Zach vaccine injections) No   Have you  received your first dose of the Pfizer or Moderna COVID vaccine? Yes   Have you recieved a Flu shot? No   Do you have transportation to get tested for COVID if it is indicated and ordered for you at an [facility] location? Yes   Provide any information you feel is important to your history not asked above When my sinuses fill up thsts when the coughing starts and I hack trying to get it up   Please attach any relevant images or files          Recent Labs Obtained:  No visits with results within 7 Day(s) from this visit.   Latest known visit with results is:   Hospital Outpatient Visit on 07/20/2023   Component Date Value Ref Range Status    BSA 07/20/2023 2.14  m2 Final    TDI SEPTAL 07/20/2023 0.05  m/s Final    LV LATERAL E/E' RATIO 07/20/2023 13.20  m/s Final    LV SEPTAL E/E' RATIO 07/20/2023 13.20  m/s Final    LA WIDTH 07/20/2023 3.50  cm Final    IVC diameter 07/20/2023 0.37  cm Final    Left Ventricular Outflow Tract Amy* 07/20/2023 0.66  cm/s Final    Left Ventricular Outflow Tract Amy* 07/20/2023 1.79  mmHg Final    Pulmonary Valve Mean Velocity 07/20/2023 0.59  m/s Final    TDI LATERAL 07/20/2023 0.05  m/s Final    PV PEAK VELOCITY 07/20/2023 0.84  cm/s Final    LVIDd 07/20/2023 5.78  3.5 - 6.0 cm Final    IVS 07/20/2023 1.15 (A)  0.6 - 1.1 cm Final    Posterior Wall 07/20/2023 1.25 (A)  0.6 - 1.1 cm Final    LVIDs 07/20/2023 4.64 (A)  2.1 - 4.0 cm Final    FS 07/20/2023 20  28 - 44 % Final    LA volume 07/20/2023 73.58  cm3 Final    STJ 07/20/2023 2.85  cm Final    Ascending aorta 07/20/2023 2.94  cm Final    LV mass 07/20/2023 295.33  g Final    LA size 07/20/2023 4.64  cm Final    RVDD 07/20/2023 2.70  cm Final    RV S' 07/20/2023 7.91  cm/s Final    Left Ventricle Relative Wall Thick* 07/20/2023 0.43  cm Final    AV regurgitation pressure 1/2 time 07/20/2023 641.786280215170822  ms Final    MV mean gradient 07/20/2023 1  mmHg Final    MV valve area by continuity eq 07/20/2023 5.64  cm2 Final    E/A ratio  "07/20/2023 0.65   Final    Mean e' 07/20/2023 0.05  m/s Final    E wave deceleration time 07/20/2023 171.95  msec Final    MV "A" wave duration 07/20/2023 114.624127530776742  msec Final    Pulm vein S/D ratio 07/20/2023 1.37   Final    LVOT diameter 07/20/2023 2.51  cm Final    LVOT area 07/20/2023 4.9  cm2 Final    LVOT peak teddy 07/20/2023 0.77  m/s Final    LVOT peak VTI 07/20/2023 19.60  cm Final    Mr max teddy 07/20/2023 5.47  m/s Final    LVOT stroke volume 07/20/2023 96.93  cm3 Final    MV peak gradient 07/20/2023 4  mmHg Final    E/E' ratio 07/20/2023 13.20  m/s Final    MV Peak E Teddy 07/20/2023 0.66  m/s Final    AR Max Teddy 07/20/2023 2.90  m/s Final    TR Max Teddy 07/20/2023 2.24  m/s Final    MV VTI 07/20/2023 17.2  cm Final    MV Peak A Teddy 07/20/2023 1.01  m/s Final    PV Peak S Teddy 07/20/2023 0.48  m/s Final    PV Peak D Teddy 07/20/2023 0.35  m/s Final    LV Systolic Volume 07/20/2023 99.14  mL Final    LV Systolic Volume Index 07/20/2023 47.2  mL/m2 Final    LV Diastolic Volume 07/20/2023 165.03  mL Final    LV Diastolic Volume Index 07/20/2023 78.59  mL/m2 Final    LA Volume Index 07/20/2023 35.0  mL/m2 Final    LV Mass Index 07/20/2023 141  g/m2 Final    RA Major [place] 07/20/2023 4.13  cm Final    Left Atrium Minor Axis 07/20/2023 5.33  cm Final    Left Atrium Major Axis 07/20/2023 5.33  cm Final    Triscuspid Valve Regurgitation Pea* 07/20/2023 20  mmHg Final    LA Volume Index (Mod) 07/20/2023 21.9  mL/m2 Final    LA volume (mod) 07/20/2023 45.90  cm3 Final    RA Width 07/20/2023 3.70  cm Final    Elizondo's Biplane MOD Ejection Fra* 07/20/2023 4  % Final    EF 07/20/2023 40  % Final       Encounter Diagnosis   Name Primary?    Acute bacterial sinusitis Yes        No orders of the defined types were placed in this encounter.     Medications Ordered This Encounter   Medications    amoxicillin-clavulanate 875-125mg (AUGMENTIN) 875-125 mg per tablet     Sig: Take 1 tablet by mouth every 12 (twelve) hours. " for 7 days     Dispense:  14 tablet     Refill:  0    fluticasone propionate (FLONASE) 50 mcg/actuation nasal spray     Si sprays (100 mcg total) by Each Nostril route as needed for Rhinitis.     Dispense:  16 g     Refill:  11    methylPREDNISolone (MEDROL DOSEPACK) 4 mg tablet     Sig: use as directed     Dispense:  21 each     Refill:  0        No follow-ups on file.      E-Visit Time Tracking:    Day 1 Time (in minutes): 5     Total Time (in minutes): 5

## 2023-10-23 ENCOUNTER — PATIENT MESSAGE (OUTPATIENT)
Dept: ADMINISTRATIVE | Facility: HOSPITAL | Age: 63
End: 2023-10-23
Payer: COMMERCIAL

## 2023-10-26 ENCOUNTER — PATIENT MESSAGE (OUTPATIENT)
Dept: ADMINISTRATIVE | Facility: HOSPITAL | Age: 63
End: 2023-10-26
Payer: COMMERCIAL

## 2024-01-02 ENCOUNTER — LAB VISIT (OUTPATIENT)
Dept: LAB | Facility: HOSPITAL | Age: 64
End: 2024-01-02
Attending: INTERNAL MEDICINE
Payer: COMMERCIAL

## 2024-01-02 DIAGNOSIS — Z00.00 ANNUAL PHYSICAL EXAM: ICD-10-CM

## 2024-01-02 LAB
ALBUMIN SERPL BCP-MCNC: 4.3 G/DL (ref 3.5–5.2)
ALP SERPL-CCNC: 58 U/L (ref 55–135)
ALT SERPL W/O P-5'-P-CCNC: 22 U/L (ref 10–44)
ANION GAP SERPL CALC-SCNC: 13 MMOL/L (ref 8–16)
AST SERPL-CCNC: 20 U/L (ref 10–40)
BASOPHILS # BLD AUTO: 0.04 K/UL (ref 0–0.2)
BASOPHILS NFR BLD: 0.8 % (ref 0–1.9)
BILIRUB SERPL-MCNC: 1 MG/DL (ref 0.1–1)
BUN SERPL-MCNC: 20 MG/DL (ref 8–23)
CALCIUM SERPL-MCNC: 9.7 MG/DL (ref 8.7–10.5)
CHLORIDE SERPL-SCNC: 100 MMOL/L (ref 95–110)
CHOLEST SERPL-MCNC: 257 MG/DL (ref 120–199)
CHOLEST/HDLC SERPL: 6.9 {RATIO} (ref 2–5)
CO2 SERPL-SCNC: 29 MMOL/L (ref 23–29)
COMPLEXED PSA SERPL-MCNC: 1.4 NG/ML (ref 0–4)
CREAT SERPL-MCNC: 1.4 MG/DL (ref 0.5–1.4)
DIFFERENTIAL METHOD BLD: ABNORMAL
EOSINOPHIL # BLD AUTO: 0.2 K/UL (ref 0–0.5)
EOSINOPHIL NFR BLD: 3.7 % (ref 0–8)
ERYTHROCYTE [DISTWIDTH] IN BLOOD BY AUTOMATED COUNT: 13.9 % (ref 11.5–14.5)
EST. GFR  (NO RACE VARIABLE): 57.2 ML/MIN/1.73 M^2
ESTIMATED AVG GLUCOSE: 108 MG/DL (ref 68–131)
GLUCOSE SERPL-MCNC: 96 MG/DL (ref 70–110)
HBA1C MFR BLD: 5.4 % (ref 4–5.6)
HCT VFR BLD AUTO: 56.6 % (ref 40–54)
HDLC SERPL-MCNC: 37 MG/DL (ref 40–75)
HDLC SERPL: 14.4 % (ref 20–50)
HGB BLD-MCNC: 18.4 G/DL (ref 14–18)
IMM GRANULOCYTES # BLD AUTO: 0.02 K/UL (ref 0–0.04)
IMM GRANULOCYTES NFR BLD AUTO: 0.4 % (ref 0–0.5)
LDLC SERPL CALC-MCNC: 174 MG/DL (ref 63–159)
LYMPHOCYTES # BLD AUTO: 1.4 K/UL (ref 1–4.8)
LYMPHOCYTES NFR BLD: 29.4 % (ref 18–48)
MCH RBC QN AUTO: 30.4 PG (ref 27–31)
MCHC RBC AUTO-ENTMCNC: 32.5 G/DL (ref 32–36)
MCV RBC AUTO: 93 FL (ref 82–98)
MONOCYTES # BLD AUTO: 0.4 K/UL (ref 0.3–1)
MONOCYTES NFR BLD: 8 % (ref 4–15)
NEUTROPHILS # BLD AUTO: 2.8 K/UL (ref 1.8–7.7)
NEUTROPHILS NFR BLD: 57.7 % (ref 38–73)
NONHDLC SERPL-MCNC: 220 MG/DL
NRBC BLD-RTO: 0 /100 WBC
PLATELET # BLD AUTO: 363 K/UL (ref 150–450)
PMV BLD AUTO: 8.6 FL (ref 9.2–12.9)
POTASSIUM SERPL-SCNC: 4.2 MMOL/L (ref 3.5–5.1)
PROT SERPL-MCNC: 7.2 G/DL (ref 6–8.4)
RBC # BLD AUTO: 6.06 M/UL (ref 4.6–6.2)
SODIUM SERPL-SCNC: 142 MMOL/L (ref 136–145)
TRIGL SERPL-MCNC: 230 MG/DL (ref 30–150)
TSH SERPL DL<=0.005 MIU/L-ACNC: 2.26 UIU/ML (ref 0.4–4)
WBC # BLD AUTO: 4.86 K/UL (ref 3.9–12.7)

## 2024-01-02 PROCEDURE — 83036 HEMOGLOBIN GLYCOSYLATED A1C: CPT | Performed by: INTERNAL MEDICINE

## 2024-01-02 PROCEDURE — 36415 COLL VENOUS BLD VENIPUNCTURE: CPT | Mod: PO | Performed by: INTERNAL MEDICINE

## 2024-01-02 PROCEDURE — 84153 ASSAY OF PSA TOTAL: CPT | Performed by: INTERNAL MEDICINE

## 2024-01-02 PROCEDURE — 84443 ASSAY THYROID STIM HORMONE: CPT | Performed by: INTERNAL MEDICINE

## 2024-01-02 PROCEDURE — 80053 COMPREHEN METABOLIC PANEL: CPT | Performed by: INTERNAL MEDICINE

## 2024-01-02 PROCEDURE — 80061 LIPID PANEL: CPT | Performed by: INTERNAL MEDICINE

## 2024-01-02 PROCEDURE — 85025 COMPLETE CBC W/AUTO DIFF WBC: CPT | Performed by: INTERNAL MEDICINE

## 2024-01-09 ENCOUNTER — OFFICE VISIT (OUTPATIENT)
Dept: INTERNAL MEDICINE | Facility: CLINIC | Age: 64
End: 2024-01-09

## 2024-01-09 VITALS
HEIGHT: 69 IN | BODY MASS INDEX: 30.98 KG/M2 | DIASTOLIC BLOOD PRESSURE: 92 MMHG | SYSTOLIC BLOOD PRESSURE: 138 MMHG | OXYGEN SATURATION: 96 % | HEART RATE: 91 BPM | WEIGHT: 209.19 LBS

## 2024-01-09 DIAGNOSIS — N18.31 STAGE 3A CHRONIC KIDNEY DISEASE: ICD-10-CM

## 2024-01-09 DIAGNOSIS — E78.2 MIXED HYPERLIPIDEMIA: ICD-10-CM

## 2024-01-09 DIAGNOSIS — I50.42 CHRONIC COMBINED SYSTOLIC AND DIASTOLIC CONGESTIVE HEART FAILURE: ICD-10-CM

## 2024-01-09 DIAGNOSIS — D58.2 ELEVATED HEMOGLOBIN: ICD-10-CM

## 2024-01-09 DIAGNOSIS — I10 ESSENTIAL HYPERTENSION: ICD-10-CM

## 2024-01-09 DIAGNOSIS — Z00.00 ANNUAL PHYSICAL EXAM: Primary | ICD-10-CM

## 2024-01-09 DIAGNOSIS — Z23 NEED FOR INFLUENZA VACCINATION: ICD-10-CM

## 2024-01-09 DIAGNOSIS — Z91.148 NONCOMPLIANCE WITH MEDICATION REGIMEN: ICD-10-CM

## 2024-01-09 PROCEDURE — 90686 IIV4 VACC NO PRSV 0.5 ML IM: CPT | Mod: PBBFAC,PO

## 2024-01-09 PROCEDURE — 99396 PREV VISIT EST AGE 40-64: CPT | Mod: S$PBB,,, | Performed by: INTERNAL MEDICINE

## 2024-01-09 PROCEDURE — 99999 PR PBB SHADOW E&M-EST. PATIENT-LVL IV: CPT | Mod: PBBFAC,,, | Performed by: INTERNAL MEDICINE

## 2024-01-09 PROCEDURE — 99999PBSHW FLU VACCINE (QUAD) GREATER THAN OR EQUAL TO 3YO PRESERVATIVE FREE IM: Mod: PBBFAC,,,

## 2024-01-09 RX ORDER — ATORVASTATIN CALCIUM 40 MG/1
40 TABLET, FILM COATED ORAL DAILY
Qty: 90 TABLET | Refills: 3 | Status: SHIPPED | OUTPATIENT
Start: 2024-01-09 | End: 2025-01-08

## 2024-01-09 RX ORDER — CARVEDILOL 6.25 MG/1
6.25 TABLET ORAL EVERY 12 HOURS
Qty: 90 TABLET | Refills: 3
Start: 2024-01-09 | End: 2025-01-08

## 2024-01-09 NOTE — PROGRESS NOTES
Patient ID: Fan Chris is a 64 y.o. male.    Chief Complaint: Annual Exam    HPI Fan is a 64 y.o. male with  hyperlipidemia, hypertension, obstructive sleep apnea, obesity, CKD stage 3, chronic combined systolic and diastolic congestive heart failure and dilated cardiomyopathy who presents for annual exam.  He presents with his wife today.  No new acute complaints.    Patient is not taking any prescription medications.  He does however take OTC testosterone supplements and Goody's powder. He does not check blood pressure at home.  Reviewed lab results from 1/2/24.    Health Maintenance Topics with due status: Not Due       Topic Last Completion Date    Colorectal Cancer Screening 06/07/2016    TETANUS VACCINE 02/26/2021    PROSTATE-SPECIFIC ANTIGEN 01/02/2024    Hemoglobin A1c (Diabetic Prevention Screening) 01/02/2024    Lipid Panel 01/02/2024        Review of Systems   All other systems reviewed and are negative.      Objective:     Vitals:    01/09/24 1454   BP: (!) 138/92   Pulse: 91        Physical Exam  Vitals reviewed.   Constitutional:       General: He is not in acute distress.     Appearance: Normal appearance. He is well-developed. He is obese. He is not ill-appearing, toxic-appearing or diaphoretic.   HENT:      Head: Normocephalic and atraumatic.      Right Ear: External ear normal.      Left Ear: External ear normal.      Nose: Nose normal.   Eyes:      Extraocular Movements: Extraocular movements intact.      Conjunctiva/sclera: Conjunctivae normal.   Cardiovascular:      Rate and Rhythm: Normal rate and regular rhythm.      Pulses: Normal pulses.      Heart sounds: Normal heart sounds. No murmur heard.     No friction rub. No gallop.   Pulmonary:      Effort: Pulmonary effort is normal. No respiratory distress.      Breath sounds: Normal breath sounds. No stridor. No wheezing, rhonchi or rales.   Musculoskeletal:      Right lower leg: No edema.      Left lower leg: No edema.   Skin:      General: Skin is warm and dry.   Neurological:      General: No focal deficit present.      Mental Status: He is alert and oriented to person, place, and time. Mental status is at baseline.   Psychiatric:         Mood and Affect: Mood normal.         Behavior: Behavior normal.         Thought Content: Thought content normal.         Judgment: Judgment normal.         Assessment:       1. Annual physical exam    2. Need for influenza vaccination    3. Mixed hyperlipidemia Poorly controlled   4. Essential hypertension Poorly controlled   5. Chronic combined systolic and diastolic congestive heart failure Well controlled   6. Stage 3a chronic kidney disease Chronic   7. Elevated hemoglobin Active   8. Noncompliance with medication regimen Chronic       Plan:     Advised patient that he is at SIGNIFICANT risk of heart attack and/or stroke by his refusal to take medications.     Annual physical exam    Need for influenza vaccination  -     Influenza - Quadrivalent *Preferred* (6 months+) (PF)    Mixed hyperlipidemia  Comments:  Patient needs to take his atorvastatin. Continue atorvastatin  Orders:  -     Lipid Panel; Future; Expected date: 04/09/2024  -     atorvastatin (LIPITOR) 40 MG tablet; Take 1 tablet (40 mg total) by mouth once daily.  Dispense: 90 tablet; Refill: 3    Essential hypertension  Comments:  Needs to take his blood pressure medication. Continue current medications  Orders:  -     Comprehensive Metabolic Panel; Future; Expected date: 04/09/2024  -     carvediloL (COREG) 6.25 MG tablet; Take 1 tablet (6.25 mg total) by mouth every 12 (twelve) hours.  Dispense: 90 tablet; Refill: 3    Chronic combined systolic and diastolic congestive heart failure  Comments:  Needs to take his beta blocker and Entresto. Continue to follow with cardiology    Stage 3a chronic kidney disease  Comments:  Needs to avoid NSAID medications (such as Goody's). continue to monitor    Elevated hemoglobin  Comments:  Most likely due  to testosterone. Needs to stop testosterone. Recheck lab  Orders:  -     CBC Auto Differential; Future; Expected date: 04/09/2024    Noncompliance with medication regimen      RTC 2-3  months         Warning signs discussed, patient to call with any further issues or worsening of symptoms.       Parts of the above note were dictated using a voice dictation software. Please excuse any grammatical or typographical errors.

## 2024-01-22 ENCOUNTER — PATIENT MESSAGE (OUTPATIENT)
Dept: CARDIOLOGY | Facility: CLINIC | Age: 64
End: 2024-01-22

## 2024-01-29 ENCOUNTER — PATIENT MESSAGE (OUTPATIENT)
Dept: CARDIOLOGY | Facility: CLINIC | Age: 64
End: 2024-01-29

## 2024-03-05 ENCOUNTER — PATIENT MESSAGE (OUTPATIENT)
Dept: ADMINISTRATIVE | Facility: HOSPITAL | Age: 64
End: 2024-03-05
Payer: COMMERCIAL

## 2024-04-04 ENCOUNTER — LAB VISIT (OUTPATIENT)
Dept: LAB | Facility: HOSPITAL | Age: 64
End: 2024-04-04
Attending: INTERNAL MEDICINE
Payer: COMMERCIAL

## 2024-04-04 DIAGNOSIS — E78.2 MIXED HYPERLIPIDEMIA: ICD-10-CM

## 2024-04-04 DIAGNOSIS — D58.2 ELEVATED HEMOGLOBIN: ICD-10-CM

## 2024-04-04 DIAGNOSIS — I10 ESSENTIAL HYPERTENSION: ICD-10-CM

## 2024-04-04 LAB
ALBUMIN SERPL BCP-MCNC: 4.4 G/DL (ref 3.5–5.2)
ALP SERPL-CCNC: 70 U/L (ref 55–135)
ALT SERPL W/O P-5'-P-CCNC: 26 U/L (ref 10–44)
ANION GAP SERPL CALC-SCNC: 10 MMOL/L (ref 8–16)
AST SERPL-CCNC: 23 U/L (ref 10–40)
BASOPHILS # BLD AUTO: 0.06 K/UL (ref 0–0.2)
BASOPHILS NFR BLD: 1.1 % (ref 0–1.9)
BILIRUB SERPL-MCNC: 0.8 MG/DL (ref 0.1–1)
BUN SERPL-MCNC: 20 MG/DL (ref 8–23)
CALCIUM SERPL-MCNC: 9.6 MG/DL (ref 8.7–10.5)
CHLORIDE SERPL-SCNC: 102 MMOL/L (ref 95–110)
CHOLEST SERPL-MCNC: 261 MG/DL (ref 120–199)
CHOLEST/HDLC SERPL: 6.2 {RATIO} (ref 2–5)
CO2 SERPL-SCNC: 27 MMOL/L (ref 23–29)
CREAT SERPL-MCNC: 1.2 MG/DL (ref 0.5–1.4)
DIFFERENTIAL METHOD BLD: ABNORMAL
EOSINOPHIL # BLD AUTO: 0.3 K/UL (ref 0–0.5)
EOSINOPHIL NFR BLD: 5.2 % (ref 0–8)
ERYTHROCYTE [DISTWIDTH] IN BLOOD BY AUTOMATED COUNT: 14.1 % (ref 11.5–14.5)
EST. GFR  (NO RACE VARIABLE): >60 ML/MIN/1.73 M^2
GLUCOSE SERPL-MCNC: 103 MG/DL (ref 70–110)
HCT VFR BLD AUTO: 47.4 % (ref 40–54)
HDLC SERPL-MCNC: 42 MG/DL (ref 40–75)
HDLC SERPL: 16.1 % (ref 20–50)
HGB BLD-MCNC: 15.9 G/DL (ref 14–18)
IMM GRANULOCYTES # BLD AUTO: 0.02 K/UL (ref 0–0.04)
IMM GRANULOCYTES NFR BLD AUTO: 0.4 % (ref 0–0.5)
LDLC SERPL CALC-MCNC: 185.2 MG/DL (ref 63–159)
LYMPHOCYTES # BLD AUTO: 1.6 K/UL (ref 1–4.8)
LYMPHOCYTES NFR BLD: 28.4 % (ref 18–48)
MCH RBC QN AUTO: 30.2 PG (ref 27–31)
MCHC RBC AUTO-ENTMCNC: 33.5 G/DL (ref 32–36)
MCV RBC AUTO: 90 FL (ref 82–98)
MONOCYTES # BLD AUTO: 0.4 K/UL (ref 0.3–1)
MONOCYTES NFR BLD: 7.7 % (ref 4–15)
NEUTROPHILS # BLD AUTO: 3.2 K/UL (ref 1.8–7.7)
NEUTROPHILS NFR BLD: 57.2 % (ref 38–73)
NONHDLC SERPL-MCNC: 219 MG/DL
NRBC BLD-RTO: 0 /100 WBC
PLATELET # BLD AUTO: 429 K/UL (ref 150–450)
PMV BLD AUTO: 8.7 FL (ref 9.2–12.9)
POTASSIUM SERPL-SCNC: 4 MMOL/L (ref 3.5–5.1)
PROT SERPL-MCNC: 7.2 G/DL (ref 6–8.4)
RBC # BLD AUTO: 5.27 M/UL (ref 4.6–6.2)
SODIUM SERPL-SCNC: 139 MMOL/L (ref 136–145)
TRIGL SERPL-MCNC: 169 MG/DL (ref 30–150)
WBC # BLD AUTO: 5.59 K/UL (ref 3.9–12.7)

## 2024-04-04 PROCEDURE — 85025 COMPLETE CBC W/AUTO DIFF WBC: CPT | Performed by: INTERNAL MEDICINE

## 2024-04-04 PROCEDURE — 80053 COMPREHEN METABOLIC PANEL: CPT | Performed by: INTERNAL MEDICINE

## 2024-04-04 PROCEDURE — 36415 COLL VENOUS BLD VENIPUNCTURE: CPT | Mod: PO | Performed by: INTERNAL MEDICINE

## 2024-04-04 PROCEDURE — 80061 LIPID PANEL: CPT | Performed by: INTERNAL MEDICINE

## 2024-04-08 ENCOUNTER — PATIENT MESSAGE (OUTPATIENT)
Dept: INTERNAL MEDICINE | Facility: CLINIC | Age: 64
End: 2024-04-08
Payer: COMMERCIAL

## 2024-04-09 ENCOUNTER — OFFICE VISIT (OUTPATIENT)
Dept: INTERNAL MEDICINE | Facility: CLINIC | Age: 64
End: 2024-04-09
Payer: COMMERCIAL

## 2024-04-09 VITALS
HEIGHT: 69 IN | SYSTOLIC BLOOD PRESSURE: 128 MMHG | WEIGHT: 207.69 LBS | DIASTOLIC BLOOD PRESSURE: 88 MMHG | OXYGEN SATURATION: 95 % | BODY MASS INDEX: 30.76 KG/M2 | HEART RATE: 80 BPM

## 2024-04-09 DIAGNOSIS — M25.562 CHRONIC PAIN OF BOTH KNEES: ICD-10-CM

## 2024-04-09 DIAGNOSIS — G89.29 CHRONIC PAIN OF BOTH KNEES: ICD-10-CM

## 2024-04-09 DIAGNOSIS — I50.42 CHRONIC COMBINED SYSTOLIC AND DIASTOLIC CONGESTIVE HEART FAILURE: ICD-10-CM

## 2024-04-09 DIAGNOSIS — M25.561 CHRONIC PAIN OF BOTH KNEES: ICD-10-CM

## 2024-04-09 DIAGNOSIS — D58.2 ELEVATED HEMOGLOBIN: ICD-10-CM

## 2024-04-09 DIAGNOSIS — I10 ESSENTIAL HYPERTENSION: ICD-10-CM

## 2024-04-09 DIAGNOSIS — E78.2 MIXED HYPERLIPIDEMIA: Primary | ICD-10-CM

## 2024-04-09 DIAGNOSIS — Z91.148 NONCOMPLIANCE WITH MEDICATION REGIMEN: ICD-10-CM

## 2024-04-09 PROCEDURE — 3008F BODY MASS INDEX DOCD: CPT | Mod: CPTII,S$GLB,, | Performed by: INTERNAL MEDICINE

## 2024-04-09 PROCEDURE — 3044F HG A1C LEVEL LT 7.0%: CPT | Mod: CPTII,S$GLB,, | Performed by: INTERNAL MEDICINE

## 2024-04-09 PROCEDURE — 1160F RVW MEDS BY RX/DR IN RCRD: CPT | Mod: CPTII,S$GLB,, | Performed by: INTERNAL MEDICINE

## 2024-04-09 PROCEDURE — 1159F MED LIST DOCD IN RCRD: CPT | Mod: CPTII,S$GLB,, | Performed by: INTERNAL MEDICINE

## 2024-04-09 PROCEDURE — 3074F SYST BP LT 130 MM HG: CPT | Mod: CPTII,S$GLB,, | Performed by: INTERNAL MEDICINE

## 2024-04-09 PROCEDURE — 99214 OFFICE O/P EST MOD 30 MIN: CPT | Mod: S$GLB,,, | Performed by: INTERNAL MEDICINE

## 2024-04-09 PROCEDURE — 3079F DIAST BP 80-89 MM HG: CPT | Mod: CPTII,S$GLB,, | Performed by: INTERNAL MEDICINE

## 2024-04-09 PROCEDURE — 99999 PR PBB SHADOW E&M-EST. PATIENT-LVL IV: CPT | Mod: PBBFAC,,, | Performed by: INTERNAL MEDICINE

## 2024-04-09 RX ORDER — ATORVASTATIN CALCIUM 10 MG/1
10 TABLET, FILM COATED ORAL DAILY
Qty: 90 TABLET | Refills: 3 | Status: SHIPPED | OUTPATIENT
Start: 2024-04-09 | End: 2025-04-09

## 2024-04-09 RX ORDER — ATORVASTATIN CALCIUM 40 MG/1
40 TABLET, FILM COATED ORAL DAILY
Qty: 90 TABLET | Refills: 3 | Status: SHIPPED | OUTPATIENT
Start: 2024-04-09 | End: 2024-04-09

## 2024-04-09 RX ORDER — CARVEDILOL 6.25 MG/1
6.25 TABLET ORAL EVERY 12 HOURS
Qty: 90 TABLET | Refills: 3
Start: 2024-04-09 | End: 2025-04-09

## 2024-04-09 NOTE — PATIENT INSTRUCTIONS
Please take your atorvastatin daily. I changed the dose to 10 mg  Please do not take testosterone  Please re-start your entresto, valsartan, and coreg

## 2024-04-09 NOTE — PROGRESS NOTES
Patient ID: Fan Chris is a 64 y.o. male.    Chief Complaint: Hyperlipidemia    HPI Fan is a 64 y.o. male with hyperlipidemia, hypertension, obstructive sleep apnea, obesity, CKD stage 3, chronic combined systolic and diastolic congestive heart failure and dilated cardiomyopathy who presents for routine follow up of medical conditions.  He presents with his wife today. He stopped his testosterone but plans to restart it. He is not taking his atorvastatin, coreg or valsartan. Reviewed lab results from 4/4/24.    At end of visit, he complains of pain located in both knees when he exercises. He then took a phone call during the visit.     Health Maintenance Topics with due status: Not Due       Topic Last Completion Date    Colorectal Cancer Screening 06/07/2016    TETANUS VACCINE 02/26/2021    PROSTATE-SPECIFIC ANTIGEN 01/02/2024    Hemoglobin A1c (Diabetic Prevention Screening) 01/02/2024    High Dose Statin 01/09/2024    Lipid Panel 04/04/2024        Review of Systems   All other systems reviewed and are negative.       Objective:     Vitals:    04/09/24 1342   BP: 128/88   Pulse: 80        Physical Exam  Vitals reviewed.   Constitutional:       General: He is not in acute distress.     Appearance: Normal appearance. He is well-developed. He is obese. He is not ill-appearing, toxic-appearing or diaphoretic.   HENT:      Head: Normocephalic and atraumatic.      Right Ear: External ear normal.      Left Ear: External ear normal.      Nose: Nose normal.   Eyes:      Extraocular Movements: Extraocular movements intact.      Conjunctiva/sclera: Conjunctivae normal.   Cardiovascular:      Rate and Rhythm: Normal rate and regular rhythm.      Pulses: Normal pulses.      Heart sounds: Normal heart sounds. No murmur heard.     No friction rub. No gallop.   Pulmonary:      Effort: Pulmonary effort is normal. No respiratory distress.      Breath sounds: Normal breath sounds. No stridor. No wheezing, rhonchi or rales.    Musculoskeletal:      Right lower leg: No edema.      Left lower leg: No edema.   Skin:     General: Skin is warm and dry.   Neurological:      General: No focal deficit present.      Mental Status: He is alert and oriented to person, place, and time. Mental status is at baseline.   Psychiatric:         Mood and Affect: Mood normal.         Behavior: Behavior normal.         Thought Content: Thought content normal.         Judgment: Judgment normal.         Assessment:       1. Mixed hyperlipidemia Poorly controlled   2. Chronic combined systolic and diastolic congestive heart failure Chronic   3. Elevated hemoglobin Inactive   4. Essential hypertension *Poorly controlled   5. Chronic pain of both knees Chronic   6. Noncompliance with medication regimen Chronic       Plan:         Mixed hyperlipidemia  Comments:  Needs to take his statin. He agrees to try lower dose of statin  Orders:  -     atorvastatin (LIPITOR) 10 MG tablet; Take 1 tablet (10 mg total) by mouth once daily.  Dispense: 90 tablet; Refill: 3  -     Comprehensive Metabolic Panel; Future; Expected date: 07/09/2024    Chronic combined systolic and diastolic congestive heart failure  Comments:  Needs to take his meds and return to cardiology    Elevated hemoglobin  Comments:  Resolved while off testosterone. I told him to stay off testosterone    Essential hypertension  Comments:  Needs to take his blood pressure medication. Continue current medications  Orders:  -     carvediloL (COREG) 6.25 MG tablet; Take 1 tablet (6.25 mg total) by mouth every 12 (twelve) hours.  Dispense: 90 tablet; Refill: 3    Chronic pain of both knees  -     X-Ray Knee 3 View Bilateral; Future; Expected date: 04/09/2024  -     Ambulatory referral/consult to Orthopedics; Future; Expected date: 04/16/2024    Noncompliance with medication regimen    Other orders  -     Discontinue: atorvastatin (LIPITOR) 40 MG tablet; Take 1 tablet (40 mg total) by mouth once daily.  Dispense: 90  tablet; Refill: 3        RTC 3 months     Warning signs discussed, patient to call with any further issues or worsening of symptoms.       Parts of the above note were dictated using a voice dictation software. Please excuse any grammatical or typographical errors.

## 2024-04-10 ENCOUNTER — HOSPITAL ENCOUNTER (OUTPATIENT)
Dept: RADIOLOGY | Facility: HOSPITAL | Age: 64
Discharge: HOME | End: 2024-04-10
Attending: INTERNAL MEDICINE
Payer: COMMERCIAL

## 2024-04-10 DIAGNOSIS — M25.562 CHRONIC PAIN OF BOTH KNEES: ICD-10-CM

## 2024-04-10 DIAGNOSIS — M25.561 CHRONIC PAIN OF BOTH KNEES: ICD-10-CM

## 2024-04-10 DIAGNOSIS — G89.29 CHRONIC PAIN OF BOTH KNEES: ICD-10-CM

## 2024-04-10 PROCEDURE — 73562 X-RAY EXAM OF KNEE 3: CPT | Mod: 26,50,, | Performed by: RADIOLOGY

## 2024-04-10 PROCEDURE — 73562 X-RAY EXAM OF KNEE 3: CPT | Mod: TC,50,FY,PO

## 2024-05-16 ENCOUNTER — OFFICE VISIT (OUTPATIENT)
Dept: CARDIOLOGY | Facility: CLINIC | Age: 64
End: 2024-05-16
Payer: COMMERCIAL

## 2024-05-16 VITALS
WEIGHT: 207.69 LBS | SYSTOLIC BLOOD PRESSURE: 142 MMHG | OXYGEN SATURATION: 99 % | HEIGHT: 69 IN | HEART RATE: 89 BPM | BODY MASS INDEX: 30.76 KG/M2 | DIASTOLIC BLOOD PRESSURE: 92 MMHG

## 2024-05-16 DIAGNOSIS — I34.0 NONRHEUMATIC MITRAL VALVE REGURGITATION: ICD-10-CM

## 2024-05-16 DIAGNOSIS — I50.42 CHRONIC COMBINED SYSTOLIC AND DIASTOLIC CONGESTIVE HEART FAILURE: ICD-10-CM

## 2024-05-16 DIAGNOSIS — I42.9 CARDIOMYOPATHY, UNSPECIFIED TYPE: Primary | ICD-10-CM

## 2024-05-16 DIAGNOSIS — I10 ESSENTIAL HYPERTENSION: ICD-10-CM

## 2024-05-16 DIAGNOSIS — E78.5 HYPERLIPIDEMIA, UNSPECIFIED HYPERLIPIDEMIA TYPE: ICD-10-CM

## 2024-05-16 DIAGNOSIS — R94.31 NONSPECIFIC ABNORMAL ELECTROCARDIOGRAM (ECG) (EKG): ICD-10-CM

## 2024-05-16 DIAGNOSIS — Z91.199 NONCOMPLIANCE: ICD-10-CM

## 2024-05-16 LAB
OHS QRS DURATION: 88 MS
OHS QTC CALCULATION: 442 MS

## 2024-05-16 PROCEDURE — 99999 PR PBB SHADOW E&M-EST. PATIENT-LVL III: CPT | Mod: PBBFAC,,, | Performed by: INTERNAL MEDICINE

## 2024-05-16 PROCEDURE — 1159F MED LIST DOCD IN RCRD: CPT | Mod: CPTII,S$GLB,, | Performed by: INTERNAL MEDICINE

## 2024-05-16 PROCEDURE — 1160F RVW MEDS BY RX/DR IN RCRD: CPT | Mod: CPTII,S$GLB,, | Performed by: INTERNAL MEDICINE

## 2024-05-16 PROCEDURE — 93000 ELECTROCARDIOGRAM COMPLETE: CPT | Mod: S$GLB,,, | Performed by: INTERNAL MEDICINE

## 2024-05-16 PROCEDURE — 99213 OFFICE O/P EST LOW 20 MIN: CPT | Mod: 25,S$GLB,, | Performed by: INTERNAL MEDICINE

## 2024-05-16 PROCEDURE — 3080F DIAST BP >= 90 MM HG: CPT | Mod: CPTII,S$GLB,, | Performed by: INTERNAL MEDICINE

## 2024-05-16 PROCEDURE — 3008F BODY MASS INDEX DOCD: CPT | Mod: CPTII,S$GLB,, | Performed by: INTERNAL MEDICINE

## 2024-05-16 PROCEDURE — 3075F SYST BP GE 130 - 139MM HG: CPT | Mod: CPTII,S$GLB,, | Performed by: INTERNAL MEDICINE

## 2024-05-16 PROCEDURE — 3044F HG A1C LEVEL LT 7.0%: CPT | Mod: CPTII,S$GLB,, | Performed by: INTERNAL MEDICINE

## 2024-05-16 NOTE — PROGRESS NOTES
Subjective:      Patient ID: Fan Chris is a 64 y.o. male.    Chief Complaint: Follow-up    HPI:  Pt is active     Has a new job inspecting corn, rice, and other grain and also cooking oil      Review of Systems   Cardiovascular:  Negative for chest pain, claudication, dyspnea on exertion, irregular heartbeat, leg swelling, near-syncope, orthopnea, palpitations and syncope.      Pt reports noncompliance with meds      Past Medical History:   Diagnosis Date    Asthma     Bilateral renal cysts     Bronchitis     Cardiomyopathy     CHF (congestive heart failure)     CKD (chronic kidney disease) stage 3, GFR 30-59 ml/min     Hyperlipidemia 10/14/2016    Hypertension     Low testosterone level in male     Sinusitis     Sleep apnea         Past Surgical History:   Procedure Laterality Date    arm fracture      EYE SURGERY      TONSILLECTOMY         Family History   Problem Relation Name Age of Onset    Lung cancer Mother      Cancer Mother  61        lung    Hypertension Father      Hyperlipidemia Father      Heart disease Paternal Grandmother         Social History     Socioeconomic History    Marital status: Single   Tobacco Use    Smoking status: Former     Current packs/day: 0.00     Average packs/day: 1 pack/day for 8.0 years (8.0 ttl pk-yrs)     Types: Cigarettes     Start date: 10/4/1976     Quit date: 10/4/1984     Years since quittin.6    Smokeless tobacco: Never   Substance and Sexual Activity    Alcohol use: Not Currently     Alcohol/week: 1.0 standard drink of alcohol     Types: 1 Cans of beer per week    Drug use: No    Sexual activity: Never     Partners: Female   Social History Narrative    Lives alone with his dog. Generally eats outside and indulges in sweets and eats large portions of food. Drinks juice and sweet tea. Drinks 4-5 glasses of water.      Social Determinants of Health     Financial Resource Strain: Low Risk  (2023)    Overall Financial Resource Strain (CARDIA)     Difficulty of  Paying Living Expenses: Not hard at all   Food Insecurity: No Food Insecurity (7/18/2023)    Hunger Vital Sign     Worried About Running Out of Food in the Last Year: Never true     Ran Out of Food in the Last Year: Never true   Transportation Needs: No Transportation Needs (7/18/2023)    PRAPARE - Transportation     Lack of Transportation (Medical): No     Lack of Transportation (Non-Medical): No   Physical Activity: Insufficiently Active (7/18/2023)    Exercise Vital Sign     Days of Exercise per Week: 3 days     Minutes of Exercise per Session: 40 min   Stress: No Stress Concern Present (7/18/2023)    [place] [employer] - Occupational Stress Questionnaire     Feeling of Stress : Only a little   Housing Stability: Low Risk  (7/18/2023)    Housing Stability Vital Sign     Unable to Pay for Housing in the Last Year: No     Number of Places Lived in the Last Year: 1     Unstable Housing in the Last Year: No   Recent Concern: Housing Stability - High Risk (7/10/2023)    Housing Stability Vital Sign     Unable to Pay for Housing in the Last Year: Yes     Number of Places Lived in the Last Year: 1     Unstable Housing in the Last Year: No       Current Outpatient Medications on File Prior to Visit   Medication Sig Dispense Refill    ASA/acetaminophen/caffeine/pot (GOODY'S HEADACHE POWDER ORAL) Take by mouth.      TESTOSTERONE CYPIONATE IM Inject into the muscle. Twice weekly      aspirin (ECOTRIN) 81 MG EC tablet Take 1 tablet (81 mg total) by mouth once daily. (Patient not taking: Reported on 5/16/2024) 30 tablet 11    atorvastatin (LIPITOR) 10 MG tablet Take 1 tablet (10 mg total) by mouth once daily. (Patient not taking: Reported on 5/16/2024) 90 tablet 3    carvediloL (COREG) 6.25 MG tablet Take 1 tablet (6.25 mg total) by mouth every 12 (twelve) hours. (Patient not taking: Reported on 5/16/2024) 90 tablet 3    fluticasone propionate (FLONASE) 50 mcg/actuation nasal spray 2 sprays (100 mcg  "total) by Each Nostril route as needed for Rhinitis. (Patient not taking: Reported on 5/16/2024) 16 g 11    omega-3 fatty acids (FISH OIL CONCENTRATE ORAL) Take by mouth. (Patient not taking: Reported on 5/16/2024)      sacubitriL-valsartan (ENTRESTO) 49-51 mg per tablet Take 1 tablet by mouth 2 (two) times daily. (Patient not taking: Reported on 1/9/2024) 180 tablet 3     No current facility-administered medications on file prior to visit.       Review of patient's allergies indicates:  No Known Allergies  Objective:     Vitals:    05/16/24 0820 05/16/24 0828   BP: (!) 131/93 (!) 142/92   BP Location: Right arm Left arm   Patient Position: Sitting Sitting   BP Method: Large (Automatic)    Pulse: 89    SpO2: 99%    Weight: 94.2 kg (207 lb 10.8 oz)    Height: 5' 9" (1.753 m)         Physical Exam  Constitutional:       General: He is not in acute distress.     Appearance: He is well-developed. He is not diaphoretic.   Eyes:      General: No scleral icterus.  Neck:      Vascular: No carotid bruit or JVD.   Cardiovascular:      Rate and Rhythm: Regular rhythm.      Heart sounds: Normal heart sounds. No murmur heard.     No friction rub. No gallop.   Pulmonary:      Effort: Pulmonary effort is normal. No respiratory distress.      Breath sounds: Normal breath sounds.   Musculoskeletal:      Right lower leg: No edema.      Left lower leg: No edema.   Skin:     General: Skin is warm and dry.   Neurological:      Mental Status: He is alert and oriented to person, place, and time.   Psychiatric:         Behavior: Behavior normal.         Thought Content: Thought content normal.         Judgment: Judgment normal.      ECG today: NSR, left axis deviation, probable LVH, nonspecific STT abnormality may represent repolarization abnormality associated with LVH      Lab Visit on 04/04/2024   Component Date Value Ref Range Status    WBC 04/04/2024 5.59  3.90 - 12.70 K/uL Final    RBC 04/04/2024 5.27  4.60 - 6.20 M/uL Final    " Hemoglobin 04/04/2024 15.9  14.0 - 18.0 g/dL Final    Hematocrit 04/04/2024 47.4  40.0 - 54.0 % Final    MCV 04/04/2024 90  82 - 98 fL Final    MCH 04/04/2024 30.2  27.0 - 31.0 pg Final    MCHC 04/04/2024 33.5  32.0 - 36.0 g/dL Final    RDW 04/04/2024 14.1  11.5 - 14.5 % Final    Platelets 04/04/2024 429  150 - 450 K/uL Final    MPV 04/04/2024 8.7 (L)  9.2 - 12.9 fL Final    Immature Granulocytes 04/04/2024 0.4  0.0 - 0.5 % Final    Gran # (ANC) 04/04/2024 3.2  1.8 - 7.7 K/uL Final    Immature Grans (Abs) 04/04/2024 0.02  0.00 - 0.04 K/uL Final    Lymph # 04/04/2024 1.6  1.0 - 4.8 K/uL Final    Mono # 04/04/2024 0.4  0.3 - 1.0 K/uL Final    Eos # 04/04/2024 0.3  0.0 - 0.5 K/uL Final    Baso # 04/04/2024 0.06  0.00 - 0.20 K/uL Final    nRBC 04/04/2024 0  0 /100 WBC Final    Gran % 04/04/2024 57.2  38.0 - 73.0 % Final    Lymph % 04/04/2024 28.4  18.0 - 48.0 % Final    Mono % 04/04/2024 7.7  4.0 - 15.0 % Final    Eosinophil % 04/04/2024 5.2  0.0 - 8.0 % Final    Basophil % 04/04/2024 1.1  0.0 - 1.9 % Final    Differential Method 04/04/2024 Automated   Final    Sodium 04/04/2024 139  136 - 145 mmol/L Final    Potassium 04/04/2024 4.0  3.5 - 5.1 mmol/L Final    Chloride 04/04/2024 102  95 - 110 mmol/L Final    CO2 04/04/2024 27  23 - 29 mmol/L Final    Glucose 04/04/2024 103  70 - 110 mg/dL Final    BUN 04/04/2024 20  8 - 23 mg/dL Final    Creatinine 04/04/2024 1.2  0.5 - 1.4 mg/dL Final    Calcium 04/04/2024 9.6  8.7 - 10.5 mg/dL Final    Total Protein 04/04/2024 7.2  6.0 - 8.4 g/dL Final    Albumin 04/04/2024 4.4  3.5 - 5.2 g/dL Final    Total Bilirubin 04/04/2024 0.8  0.1 - 1.0 mg/dL Final    Alkaline Phosphatase 04/04/2024 70  55 - 135 U/L Final    AST 04/04/2024 23  10 - 40 U/L Final    ALT 04/04/2024 26  10 - 44 U/L Final    eGFR 04/04/2024 >60.0  >60 mL/min/1.73 m^2 Final    Anion Gap 04/04/2024 10  8 - 16 mmol/L Final    Cholesterol 04/04/2024 261 (H)  120 - 199 mg/dL Final    Triglycerides 04/04/2024 169 (H)  30  - 150 mg/dL Final    HDL 04/04/2024 42  40 - 75 mg/dL Final    LDL Cholesterol 04/04/2024 185.2 (H)  63.0 - 159.0 mg/dL Final    HDL/Cholesterol Ratio 04/04/2024 16.1 (L)  20.0 - 50.0 % Final    Total Cholesterol/HDL Ratio 04/04/2024 6.2 (H)  2.0 - 5.0 Final    Non-HDL Cholesterol 04/04/2024 219  mg/dL Final   Lab Visit on 01/02/2024   Component Date Value Ref Range Status    WBC 01/02/2024 4.86  3.90 - 12.70 K/uL Final    RBC 01/02/2024 6.06  4.60 - 6.20 M/uL Final    Hemoglobin 01/02/2024 18.4 (H)  14.0 - 18.0 g/dL Final    Hematocrit 01/02/2024 56.6 (H)  40.0 - 54.0 % Final    MCV 01/02/2024 93  82 - 98 fL Final    MCH 01/02/2024 30.4  27.0 - 31.0 pg Final    MCHC 01/02/2024 32.5  32.0 - 36.0 g/dL Final    RDW 01/02/2024 13.9  11.5 - 14.5 % Final    Platelets 01/02/2024 363  150 - 450 K/uL Final    MPV 01/02/2024 8.6 (L)  9.2 - 12.9 fL Final    Immature Granulocytes 01/02/2024 0.4  0.0 - 0.5 % Final    Gran # (ANC) 01/02/2024 2.8  1.8 - 7.7 K/uL Final    Immature Grans (Abs) 01/02/2024 0.02  0.00 - 0.04 K/uL Final    Lymph # 01/02/2024 1.4  1.0 - 4.8 K/uL Final    Mono # 01/02/2024 0.4  0.3 - 1.0 K/uL Final    Eos # 01/02/2024 0.2  0.0 - 0.5 K/uL Final    Baso # 01/02/2024 0.04  0.00 - 0.20 K/uL Final    nRBC 01/02/2024 0  0 /100 WBC Final    Gran % 01/02/2024 57.7  38.0 - 73.0 % Final    Lymph % 01/02/2024 29.4  18.0 - 48.0 % Final    Mono % 01/02/2024 8.0  4.0 - 15.0 % Final    Eosinophil % 01/02/2024 3.7  0.0 - 8.0 % Final    Basophil % 01/02/2024 0.8  0.0 - 1.9 % Final    Differential Method 01/02/2024 Automated   Final    Sodium 01/02/2024 142  136 - 145 mmol/L Final    Potassium 01/02/2024 4.2  3.5 - 5.1 mmol/L Final    Chloride 01/02/2024 100  95 - 110 mmol/L Final    CO2 01/02/2024 29  23 - 29 mmol/L Final    Glucose 01/02/2024 96  70 - 110 mg/dL Final    BUN 01/02/2024 20  8 - 23 mg/dL Final    Creatinine 01/02/2024 1.4  0.5 - 1.4 mg/dL Final    Calcium 01/02/2024 9.7  8.7 - 10.5 mg/dL Final    Total  Protein 01/02/2024 7.2  6.0 - 8.4 g/dL Final    Albumin 01/02/2024 4.3  3.5 - 5.2 g/dL Final    Total Bilirubin 01/02/2024 1.0  0.1 - 1.0 mg/dL Final    Alkaline Phosphatase 01/02/2024 58  55 - 135 U/L Final    AST 01/02/2024 20  10 - 40 U/L Final    ALT 01/02/2024 22  10 - 44 U/L Final    eGFR 01/02/2024 57.2 (A)  >60 mL/min/1.73 m^2 Final    Anion Gap 01/02/2024 13  8 - 16 mmol/L Final    Hemoglobin A1C 01/02/2024 5.4  4.0 - 5.6 % Final    Estimated Avg Glucose 01/02/2024 108  68 - 131 mg/dL Final    Cholesterol 01/02/2024 257 (H)  120 - 199 mg/dL Final    Triglycerides 01/02/2024 230 (H)  30 - 150 mg/dL Final    HDL 01/02/2024 37 (L)  40 - 75 mg/dL Final    LDL Cholesterol 01/02/2024 174.0 (H)  63.0 - 159.0 mg/dL Final    HDL/Cholesterol Ratio 01/02/2024 14.4 (L)  20.0 - 50.0 % Final    Total Cholesterol/HDL Ratio 01/02/2024 6.9 (H)  2.0 - 5.0 Final    Non-HDL Cholesterol 01/02/2024 220  mg/dL Final    TSH 01/02/2024 2.261  0.400 - 4.000 uIU/mL Final    PSA, Screen 01/02/2024 1.4  0.00 - 4.00 ng/mL Final   (  counter Form Printed    Encounter form printed on 08/22/22 06:54 AM         NM Myocardial Perfusion Spect Multi Exer  Status: Final result     [employer] Results Release    aitainment Status: Active  Results Release     PACS Images for Tree[employer] Viewer     Show images for NM Myocardial Perfusion Spect Multi Exer  All Reviewers List    Chiki Mcdaniel MD on 8/23/2022 12:08      Contains abnormal data NM Myocardial Perfusion Spect Multi Exer  Order: [id number]  Status: Final result      Visible to patient: Yes (seen)       Next appt: 01/02/2024 at 08:00 AM in Lab ([facility])       Dx: Mixed hyperlipidemia; Nonspecific abn...       0 Result Notes      Details    Reading Physician Reading Date Result Priority   Darrel Carmona MD  [phone] 8/22/2022 Routine   Stacy Tom MD  [phone] 8/22/2022      Narrative & Impression  EXAMINATION:  NM MYOCARDIAL PERFUSION SPECT MULTI STUDY      CLINICAL HISTORY:  Heart failure, known or suspected, initial workup;abnormal ECG;  Cardiomyopathy, unspecified     TECHNIQUE:  SPECT images in short, vertical and horizontal long axis were acquired after the injection of 10.6 mCi of Tc-99m Myoview at rest and 10.6 mCi during a cardiac stress. The clinical stress and ECG portion of the study is to be read separately.     CT was performed for attenuation correction.     COMPARISON:  Chest x-ray of today's date and myocardial perfusion 10/07/2016.     FINDINGS:  The quality of the study is good..     Stress SPECT images demonstrate diaphragmatic attenuation of the inferior wall which improves with attenuation correction.  On the resting images, there is matching inferior wall attenuation that improves with correction     The gated post-stress images reveal impaired wall motion and thickening with an estimated LVEF of 29 %. The LV cavity is dilated with an end-diastolic volume of 237 ml and an end-systolic volume of 170 ml.     CT is notable for an 8 mm left upper lobe nodule.     Impression:     1. No scintigraphic evidence of ischemia or scar..  2. The global left ventricular systolic function is diminished with an LV ejection fraction of 29 % and dilation of the left ventricle.  Wall motion is globally hypokinetic.  3. Incidental 8 mm left upper lobe nodule corresponding with finding on today's chest x-ray.  The lungs are incompletely imaged by attenuation correction CT, and further evaluation with dedicated chest CT is recommended in this former smoker.  This report was flagged in Epic as containing an incidental finding.     I, Darrel Carmona MD, attest that I reviewed and interpreted the images.     Electronically signed by resident: Stacy Tom  Date:                                            08/22/2022  Time:                                           16:06     Electronically signed by: Darrel Carmona  Date:                                             "08/22/2022  Time:                                           17:49               Exam Ended: 08/22/22 09:28           Accession #: [id number]  Transthoracic echo (TTE) complete  Order# [id number]  Reading physician: Chiki Mcdaniel MD Ordering physician: Chiki Mcdaniel MD Study date: 7/20/23     Reason for Exam  Priority: Routine  Dx: Dilated cardiomyopathy [I42.0 (ICD-10-CM)]; Chronic combined systolic and diastolic congestive heart failure [I50.42 (ICD-10-CM)]; Mixed hyperlipidemia [E78.2 (ICD-10-CM)]; Nonrheumatic mitral valve regurgitation [I34.0 (ICD-10-CM)]; Chronic kidney disease, stage 3a [N18.31 (ICD-10-CM)]     View Images Vital Vitrea     Show images for Echo Saline Bubble? No  Summary    The left ventricle is mildly enlarged with mild concentric hypertrophy and mildly decreased systolic function.  Moderate left atrial enlargement.  The estimated ejection fraction is 40%.  Grade I left ventricular diastolic dysfunction.  Normal right ventricular size with normal right ventricular systolic function.  Mild tricuspid regurgitation.  Mild aortic regurgitation.   CT Chest Without Contrast  Status: Final result     [employer] Results Release    [employer] Status: Active  Results Release     PACS Images for Beaker Viewer     Show images for CT Chest Without Contrast  All Reviewers List    Chiki Mcdaniel MD on 8/30/2022 15:04     CT Chest Without Contrast  Order: [id number]  Status: Final result      Visible to patient: Yes (seen)       Next appt: 01/02/2024 at 08:00 AM in Lab ([facility])       Dx: Solitary pulmonary nodule       0 Result Notes      Details    Reading Physician Reading Date Result Priority   Marni Martini MD  [phone] 8/30/2022 Routine     Narrative & Impression  EXAMINATION:  CT CHEST WITHOUT CONTRAST     CLINICAL HISTORY:  "Abnormal xray - lung nodule, < 1 cm, mod-high risk;"     COMPARISON:  08/22/2022 radiographs     TECHNIQUE:  Volumetric data acquisition of the " chest from the lung apices to the adrenals was obtained without intravenous contrast. Sagittal and coronal multiplanar reconstructions were performed. Lack of IV contrast material limits the assessment of mediastinal and abdominal structures.     FINDINGS:  Lungs and large airways: Trachea and proximal airways are patent.     Few small pulmonary nodules scattered throughout both lung fields, the largest is located in the lingula, and correspond to the finding on the chest radiograph 08/22/2022, a small solid nodule with central calcification measures 1.0 cm series 4, image 246, it is favored to be benign.  There are few other smaller calcified benign fully calcified granulomas.  No acute focal area of airspace consolidation.  No bronchiectasis.  No significant reticulation or fibrosis.     Pleura: No pleural effusion or thickening.     Heart and pericardium: Heart size is normal. No pericardial effusion.  Mild calcified atherosclerosis of the coronaries.     Mediastinum and kinsey: No lymphadenopathy.     Chest wall and lower neck: Unremarkable.     Vessels: The thoracic aorta is of normal caliber and demonstrates very minimal atherosclerotic plaque. Enlarged azygous vein. The intrahepatic IVC is not well visualized.  Few collateral vessels noted in the right upper abdominal quadrant.     Bones: Mild age related degenerative changes.  No acute fracture.  No suspicious lytic or sclerotic lesion.     Upper abdomen:  Partially visualized hyperattenuating lesion of the right kidney suggestive a hemorrhagic cyst measuring 1.9 cm. There is a hypoattenuating lesion at the upper pole of the right kidney measuring 2.0 cm with low-attenuation suggestive of a cyst.     Impression:     Partially calcified pulmonary nodule within the lingular region corresponds to what was seen on radiograph 08/22/2022 exam.  There are few scattered pulmonary nodule bilaterally, For multiple solid nodules with any 6 mm or greater, Fleischmario  Society guidelines recommend follow up with non-contrast chest CT at 3-6 months and 18-24 months after discovery.     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     Right kidney lesion, one appear to represent a hemorrhagic cyst and one possible simple cyst, these could be better evaluated with ultrasound if clinically warranted.        Electronically signed by: Marni Martini MD  Date:                                            08/30/2022  Time:                                           09:52       Assessment:     1. Cardiomyopathy, unspecified type    2. Chronic combined systolic and diastolic congestive heart failure    3. Essential hypertension    4. Hyperlipidemia, unspecified hyperlipidemia type    5. Nonrheumatic mitral valve regurgitation    6. Nonspecific abnormal electrocardiogram (ECG) (EKG)    7. Noncompliance      Plan:   Fan was seen today for follow-up.    Diagnoses and all orders for this visit:    Cardiomyopathy, unspecified type  -     IN OFFICE EKG 12-LEAD (to [facility])    Chronic combined systolic and diastolic congestive heart failure  -     IN OFFICE EKG 12-LEAD (to [facility])    Essential hypertension  -     IN OFFICE EKG 12-LEAD (to Muse)    Hyperlipidemia, unspecified hyperlipidemia type  -     IN OFFICE EKG 12-LEAD (to Muse)    Nonrheumatic mitral valve regurgitation  -     IN OFFICE EKG 12-LEAD (to Muse)    Nonspecific abnormal electrocardiogram (ECG) (EKG)  -     IN OFFICE EKG 12-LEAD (to Muse)    Noncompliance  -     IN OFFICE EKG 12-LEAD (to Muse)     Long discussion with pt and wife regarding dx and treatment and benefit of compliance with meds.    Will repeat the echocardiogram    F/u with PCP    Follow up in about 6 months (around 11/16/2024).

## 2024-07-09 ENCOUNTER — OFFICE VISIT (OUTPATIENT)
Dept: FAMILY MEDICINE | Facility: CLINIC | Age: 64
End: 2024-07-09
Payer: COMMERCIAL

## 2024-07-09 ENCOUNTER — LAB VISIT (OUTPATIENT)
Dept: LAB | Facility: HOSPITAL | Age: 64
End: 2024-07-09
Attending: STUDENT IN AN ORGANIZED HEALTH CARE EDUCATION/TRAINING PROGRAM
Payer: COMMERCIAL

## 2024-07-09 VITALS
BODY MASS INDEX: 31.25 KG/M2 | HEART RATE: 85 BPM | WEIGHT: 211 LBS | HEIGHT: 69 IN | OXYGEN SATURATION: 98 % | DIASTOLIC BLOOD PRESSURE: 84 MMHG | SYSTOLIC BLOOD PRESSURE: 130 MMHG

## 2024-07-09 DIAGNOSIS — R35.1 FREQUENT URINATION AT NIGHT: ICD-10-CM

## 2024-07-09 DIAGNOSIS — Z79.890 LONG-TERM CURRENT USE OF TESTOSTERONE CYPIONATE: ICD-10-CM

## 2024-07-09 DIAGNOSIS — D58.2 ELEVATED HEMOGLOBIN: Primary | ICD-10-CM

## 2024-07-09 DIAGNOSIS — M25.562 CHRONIC PAIN OF BOTH KNEES: ICD-10-CM

## 2024-07-09 DIAGNOSIS — I10 ESSENTIAL HYPERTENSION: ICD-10-CM

## 2024-07-09 DIAGNOSIS — I50.42 CHRONIC COMBINED SYSTOLIC AND DIASTOLIC CONGESTIVE HEART FAILURE: ICD-10-CM

## 2024-07-09 DIAGNOSIS — N18.31 STAGE 3A CHRONIC KIDNEY DISEASE: ICD-10-CM

## 2024-07-09 DIAGNOSIS — M25.561 CHRONIC PAIN OF BOTH KNEES: ICD-10-CM

## 2024-07-09 DIAGNOSIS — Z91.148 NONCOMPLIANCE WITH MEDICATION REGIMEN: ICD-10-CM

## 2024-07-09 DIAGNOSIS — G89.29 CHRONIC PAIN OF BOTH KNEES: ICD-10-CM

## 2024-07-09 DIAGNOSIS — E78.2 MIXED HYPERLIPIDEMIA: ICD-10-CM

## 2024-07-09 DIAGNOSIS — D58.2 ELEVATED HEMOGLOBIN: ICD-10-CM

## 2024-07-09 LAB
ALBUMIN SERPL BCP-MCNC: 4.2 G/DL (ref 3.5–5.2)
ALP SERPL-CCNC: 60 U/L (ref 55–135)
ALT SERPL W/O P-5'-P-CCNC: 24 U/L (ref 10–44)
ANION GAP SERPL CALC-SCNC: 10 MMOL/L (ref 8–16)
AST SERPL-CCNC: 28 U/L (ref 10–40)
BASOPHILS # BLD AUTO: 0.05 K/UL (ref 0–0.2)
BASOPHILS NFR BLD: 0.9 % (ref 0–1.9)
BILIRUB SERPL-MCNC: 0.7 MG/DL (ref 0.1–1)
BUN SERPL-MCNC: 16 MG/DL (ref 8–23)
CALCIUM SERPL-MCNC: 9.2 MG/DL (ref 8.7–10.5)
CHLORIDE SERPL-SCNC: 105 MMOL/L (ref 95–110)
CO2 SERPL-SCNC: 25 MMOL/L (ref 23–29)
CREAT SERPL-MCNC: 1.6 MG/DL (ref 0.5–1.4)
DIFFERENTIAL METHOD BLD: ABNORMAL
EOSINOPHIL # BLD AUTO: 0.2 K/UL (ref 0–0.5)
EOSINOPHIL NFR BLD: 2.9 % (ref 0–8)
ERYTHROCYTE [DISTWIDTH] IN BLOOD BY AUTOMATED COUNT: 14.4 % (ref 11.5–14.5)
EST. GFR  (NO RACE VARIABLE): 48.4 ML/MIN/1.73 M^2
GLUCOSE SERPL-MCNC: 79 MG/DL (ref 70–110)
HCT VFR BLD AUTO: 51.5 % (ref 40–54)
HGB BLD-MCNC: 16.4 G/DL (ref 14–18)
IMM GRANULOCYTES # BLD AUTO: 0.03 K/UL (ref 0–0.04)
IMM GRANULOCYTES NFR BLD AUTO: 0.5 % (ref 0–0.5)
IRON SERPL-MCNC: 92 UG/DL (ref 45–160)
LYMPHOCYTES # BLD AUTO: 1.5 K/UL (ref 1–4.8)
LYMPHOCYTES NFR BLD: 27.6 % (ref 18–48)
MCH RBC QN AUTO: 29.9 PG (ref 27–31)
MCHC RBC AUTO-ENTMCNC: 31.8 G/DL (ref 32–36)
MCV RBC AUTO: 94 FL (ref 82–98)
MONOCYTES # BLD AUTO: 0.5 K/UL (ref 0.3–1)
MONOCYTES NFR BLD: 9.2 % (ref 4–15)
NEUTROPHILS # BLD AUTO: 3.3 K/UL (ref 1.8–7.7)
NEUTROPHILS NFR BLD: 58.9 % (ref 38–73)
NRBC BLD-RTO: 0 /100 WBC
PLATELET # BLD AUTO: 383 K/UL (ref 150–450)
PMV BLD AUTO: 9 FL (ref 9.2–12.9)
POTASSIUM SERPL-SCNC: 4.5 MMOL/L (ref 3.5–5.1)
PROT SERPL-MCNC: 7.2 G/DL (ref 6–8.4)
RBC # BLD AUTO: 5.49 M/UL (ref 4.6–6.2)
SATURATED IRON: 20 % (ref 20–50)
SODIUM SERPL-SCNC: 140 MMOL/L (ref 136–145)
TOTAL IRON BINDING CAPACITY: 466 UG/DL (ref 250–450)
TRANSFERRIN SERPL-MCNC: 315 MG/DL (ref 200–375)
WBC # BLD AUTO: 5.54 K/UL (ref 3.9–12.7)

## 2024-07-09 PROCEDURE — 99215 OFFICE O/P EST HI 40 MIN: CPT | Mod: S$GLB,,, | Performed by: STUDENT IN AN ORGANIZED HEALTH CARE EDUCATION/TRAINING PROGRAM

## 2024-07-09 PROCEDURE — 83540 ASSAY OF IRON: CPT | Performed by: STUDENT IN AN ORGANIZED HEALTH CARE EDUCATION/TRAINING PROGRAM

## 2024-07-09 PROCEDURE — 3079F DIAST BP 80-89 MM HG: CPT | Mod: CPTII,S$GLB,, | Performed by: STUDENT IN AN ORGANIZED HEALTH CARE EDUCATION/TRAINING PROGRAM

## 2024-07-09 PROCEDURE — 3044F HG A1C LEVEL LT 7.0%: CPT | Mod: CPTII,S$GLB,, | Performed by: STUDENT IN AN ORGANIZED HEALTH CARE EDUCATION/TRAINING PROGRAM

## 2024-07-09 PROCEDURE — 99999 PR PBB SHADOW E&M-EST. PATIENT-LVL IV: CPT | Mod: PBBFAC,,, | Performed by: STUDENT IN AN ORGANIZED HEALTH CARE EDUCATION/TRAINING PROGRAM

## 2024-07-09 PROCEDURE — 84466 ASSAY OF TRANSFERRIN: CPT | Performed by: STUDENT IN AN ORGANIZED HEALTH CARE EDUCATION/TRAINING PROGRAM

## 2024-07-09 PROCEDURE — 80053 COMPREHEN METABOLIC PANEL: CPT | Performed by: STUDENT IN AN ORGANIZED HEALTH CARE EDUCATION/TRAINING PROGRAM

## 2024-07-09 PROCEDURE — 1159F MED LIST DOCD IN RCRD: CPT | Mod: CPTII,S$GLB,, | Performed by: STUDENT IN AN ORGANIZED HEALTH CARE EDUCATION/TRAINING PROGRAM

## 2024-07-09 PROCEDURE — G2211 COMPLEX E/M VISIT ADD ON: HCPCS | Mod: S$GLB,,, | Performed by: STUDENT IN AN ORGANIZED HEALTH CARE EDUCATION/TRAINING PROGRAM

## 2024-07-09 PROCEDURE — 36415 COLL VENOUS BLD VENIPUNCTURE: CPT | Mod: PO | Performed by: STUDENT IN AN ORGANIZED HEALTH CARE EDUCATION/TRAINING PROGRAM

## 2024-07-09 PROCEDURE — 85025 COMPLETE CBC W/AUTO DIFF WBC: CPT | Performed by: STUDENT IN AN ORGANIZED HEALTH CARE EDUCATION/TRAINING PROGRAM

## 2024-07-09 PROCEDURE — 1160F RVW MEDS BY RX/DR IN RCRD: CPT | Mod: CPTII,S$GLB,, | Performed by: STUDENT IN AN ORGANIZED HEALTH CARE EDUCATION/TRAINING PROGRAM

## 2024-07-09 PROCEDURE — 3075F SYST BP GE 130 - 139MM HG: CPT | Mod: CPTII,S$GLB,, | Performed by: STUDENT IN AN ORGANIZED HEALTH CARE EDUCATION/TRAINING PROGRAM

## 2024-07-09 PROCEDURE — 3008F BODY MASS INDEX DOCD: CPT | Mod: CPTII,S$GLB,, | Performed by: STUDENT IN AN ORGANIZED HEALTH CARE EDUCATION/TRAINING PROGRAM

## 2024-07-09 NOTE — PROGRESS NOTES
History & Physical  [facility]      SUBJECTIVE:     History of Present Illness:  Patient is a 64 y.o. male presents to clinic to establish care. PMH hyperlipidemia, hypertension, obstructive sleep apnea, obesity, CKD stage 3, chronic combined systolic and diastolic congestive heart failure and dilated cardiomyopathy. Established with cardiology.     Knee pain: Knees will hurt with running and going down the stairs. When he firsts starts running it is painful but as he gets warmed up will improve. Had been referred to ortho but with work schedule wasn't able to make appt. Has never had a steroid injection in the knees. Has not done formal PT for this but exercises regularly. Does adult boxing multiple times a week. X-ray done earlier in the year with mild arthritis. Hip flexors are very tight and stretching doesn't seem to help. Pain is right on the anterior knee in the middle.     Testosterone: Pt had previously had elevated hemoglobin with thrombocytosis that had resolved with stopping testosterone. Prior PCP recommended continued cessation but pt has re-started it. Started it about 2 months ago. Gave blood yesterday, Hgb was 18-19 prior to giving blood and iron was high. Not being monitored with labs for his testosterone.     HTN/CHF: Prescribed coreg and entresto BID. Not taking these currently.     HLD: Prescribed lipitor and ASA. Not taking these currently.     Frequent urination: Will get up to urinate every 3 hours at night. Drinks a lot of water during the day. Will drink water throughout the night as well. No difficulty urinating, malodorous urine, dysuria, hematuria. No problems initiating stream. Stream is strong.         Review of patient's allergies indicates:  No Known Allergies    Past Medical History:   Diagnosis Date    Asthma     Bilateral renal cysts     Bronchitis     Cardiomyopathy     CHF (congestive heart failure)     CKD (chronic kidney disease) stage 3,  "GFR 30-59 ml/min     Hyperlipidemia 10/14/2016    Hypertension     Low testosterone level in male     Sinusitis     Sleep apnea      Past Surgical History:   Procedure Laterality Date    arm fracture      EYE SURGERY      TONSILLECTOMY       Family History   Problem Relation Name Age of Onset    Lung cancer Mother      Cancer Mother  61        lung    Hypertension Father      Hyperlipidemia Father      Heart disease Paternal Grandmother       Social History     Tobacco Use    Smoking status: Former     Current packs/day: 0.00     Average packs/day: 1 pack/day for 8.0 years (8.0 ttl pk-yrs)     Types: Cigarettes     Start date: 10/4/1976     Quit date: 10/4/1984     Years since quittin.7    Smokeless tobacco: Never   Substance Use Topics    Alcohol use: Not Currently     Alcohol/week: 1.0 standard drink of alcohol     Types: 1 Cans of beer per week    Drug use: No        OBJECTIVE:     Vital Signs (Most Recent)  Vitals:    24 1301   BP: 130/84   BP Location: Right arm   Patient Position: Sitting   BP Method: Large (Manual)   Pulse: 85   SpO2: 98%   Weight: 95.7 kg (210 lb 15.7 oz)   Height: 5' 9" (1.753 m)     BMI: 31.16    Physical Exam:  Gen: No apparent distress, well nourished and developed, appears stated age  CV: RRR, S1 and S2 present, no LE edema  Resp: CTAB, normal respiratory effort  Knees: Positive patellar glide test, no TTP or edema of knees       ASSESSMENT/PLAN:   64 y.o.male presents to clinic to establish care.     1. Elevated hemoglobin  Recommend stopping testosterone as elevated hemoglobin and possibly his increased muscle pain likely being caused by testosterone. Discussed risks of liver failure, heart attack and stroke with inappropriate testosterone use. Unfortunately pt not being monitored by provider giving him testosterone so will schedule q3 month CBC and iron to monitor. Pt verbalized understanding but does not plan to stop testosterone.    - CBC auto differential; " Standing    2. Long-term current use of testosterone cypionate  As above  - CBC auto differential; Standing  - IRON AND TIBC; Standing    3. Noncompliance with medication regimen  Stressed need for compliance with medication regimen.     4. Chronic combined systolic and diastolic congestive heart failure  Needs to be compliant with medication regimen and continue for f/u with cardiology.   - Comprehensive metabolic panel; Future    5. Stage 3a chronic kidney disease  Noted on prior labs but most recent wnl, will recheck  - Comprehensive metabolic panel; Future    6. Chronic pain of both knees  Will bring patient back for knee injection. Some arthritis on recent x-ray. Pt verbalized understanding and was in agreement with the plan.  Declined PT given work schedule.     7. Essential hypertension  Discussed while BP wnl it is above goal for someone with reduced EF.     8. Mixed hyperlipidemia  Stressed need for medication compliance.     9. Frequent urination at night  Seems to be related to hydration. Recommend he stop drinking water 2 hours before bedtime and not during the night. Low suspicion for prostate etiology at this time.     Follow-up: next available for knee injection    I spent a total of 50 minutes on the day of the visit.This includes face to face time and non-face to face time preparing to see the patient (eg, review of tests), obtaining and/or reviewing separately obtained history, documenting clinical information in the electronic or other health record, independently interpreting results and communicating results to the patient/family/caregiver, or care coordinator.      Dilshad Serrano, DO  Family Medicine

## 2024-07-11 ENCOUNTER — OFFICE VISIT (OUTPATIENT)
Dept: FAMILY MEDICINE | Facility: CLINIC | Age: 64
End: 2024-07-11
Payer: COMMERCIAL

## 2024-07-11 VITALS
HEIGHT: 69 IN | WEIGHT: 212.31 LBS | DIASTOLIC BLOOD PRESSURE: 84 MMHG | OXYGEN SATURATION: 99 % | BODY MASS INDEX: 31.44 KG/M2 | HEART RATE: 83 BPM | SYSTOLIC BLOOD PRESSURE: 122 MMHG

## 2024-07-11 DIAGNOSIS — M25.561 CHRONIC PAIN OF RIGHT KNEE: Primary | ICD-10-CM

## 2024-07-11 DIAGNOSIS — G89.29 CHRONIC PAIN OF RIGHT KNEE: Primary | ICD-10-CM

## 2024-07-11 DIAGNOSIS — N18.31 STAGE 3A CHRONIC KIDNEY DISEASE: ICD-10-CM

## 2024-07-11 PROCEDURE — 99999 PR PBB SHADOW E&M-EST. PATIENT-LVL III: CPT | Mod: PBBFAC,,, | Performed by: STUDENT IN AN ORGANIZED HEALTH CARE EDUCATION/TRAINING PROGRAM

## 2024-07-11 RX ADMIN — LIDOCAINE HYDROCHLORIDE 4 ML: 10 INJECTION INFILTRATION; PERINEURAL at 03:07

## 2024-07-11 RX ADMIN — TRIAMCINOLONE ACETONIDE 40 MG: 40 INJECTION, SUSPENSION INTRA-ARTICULAR; INTRAMUSCULAR at 03:07

## 2024-07-12 RX ORDER — LIDOCAINE HYDROCHLORIDE 10 MG/ML
4 INJECTION INFILTRATION; PERINEURAL
Status: DISCONTINUED | OUTPATIENT
Start: 2024-07-11 | End: 2024-07-12 | Stop reason: HOSPADM

## 2024-07-12 RX ORDER — TRIAMCINOLONE ACETONIDE 40 MG/ML
40 INJECTION, SUSPENSION INTRA-ARTICULAR; INTRAMUSCULAR
Status: DISCONTINUED | OUTPATIENT
Start: 2024-07-11 | End: 2024-07-12 | Stop reason: HOSPADM

## 2024-07-12 NOTE — PROGRESS NOTES
Progress Note  [facility]    Subjective:     Fan Chris is a 64 y.o. year old male who presents to clinic for knee injection.     Knee: has chronic pain in both knees. Would like to get injection in R knee today and then come back to get L next.     Labs: Pt with recurrence of decreased kidney function. Has one test 3 months ago with normal kidney function while off of testosterone.     Patient Active Problem List    Diagnosis Date Noted    Chronic pain of both knees 07/09/2024    Solitary pulmonary nodule 07/25/2023    Noncompliance 07/25/2023    Erectile dysfunction due to arterial insufficiency 07/10/2023    Stage 3 chronic kidney disease 12/19/2022    Essential hypertension 12/19/2022    Apical mural thrombus 08/29/2022    JACLYN (obstructive sleep apnea)     Thrombocytosis 02/27/2020    Nonrheumatic mitral valve regurgitation 10/14/2016    Hyperlipidemia 10/14/2016    Nonspecific abnormal electrocardiogram (ECG) (EKG) 10/04/2016    Cardiomyopathy 10/04/2016    Chronic combined systolic and diastolic congestive heart failure 10/04/2016        Review of patient's allergies indicates:  No Known Allergies     Past Medical History:   Diagnosis Date    Asthma     Bilateral renal cysts     Bronchitis     Cardiomyopathy     CHF (congestive heart failure)     CKD (chronic kidney disease) stage 3, GFR 30-59 ml/min     Hyperlipidemia 10/14/2016    Hypertension     Low testosterone level in male     Sinusitis     Sleep apnea       Past Surgical History:   Procedure Laterality Date    arm fracture      EYE SURGERY      TONSILLECTOMY        Family History   Problem Relation Name Age of Onset    Lung cancer Mother      Cancer Mother  61        lung    Hypertension Father      Hyperlipidemia Father      Heart disease Paternal Grandmother        Social History     Tobacco Use    Smoking status: Former     Current packs/day: 0.00     Average packs/day: 1 pack/day for 8.0 years (8.0 ttl pk-yrs)  "    Types: Cigarettes     Start date: 10/4/1976     Quit date: 10/4/1984     Years since quittin.7    Smokeless tobacco: Never   Substance Use Topics    Alcohol use: Not Currently     Alcohol/week: 1.0 standard drink of alcohol     Types: 1 Cans of beer per week        Objective:     Vitals:    24 1540   BP: 122/84   BP Location: Left arm   Patient Position: Sitting   BP Method: Large (Manual)   Pulse: 83   SpO2: 99%   Weight: 96.3 kg (212 lb 4.9 oz)   Height: 5' 9" (1.753 m)     BMI: 31.35    Gen: No apparent distress, well nourished and developed, appears stated age  CV: RRR, S1 and S2 present, no LE edema  Resp: CTAB, normal respiratory effort    Large Joint Aspiration/Injection: R knee    Date/Time: 2024 3:40 PM    Performed by: Dilshad Serrano, DO  Authorized by: Dilshad Serrano, DO    Consent Done?:  Yes (Written)  Indications:  Pain    Local anesthesia used?: Yes    Anesthesia:  Local infiltration  Local anesthetic:  Lidocaine spray    Details:  Needle Size:  25 G  Ultrasonic Guidance for needle placement?: No    Approach:  Anteromedial  Location:  Knee  Site:  R knee  Medications:  4 mL LIDOcaine HCL 10 mg/ml (1%) 10 mg/mL (1 %); 40 mg triamcinolone acetonide 40 mg/mL  Patient tolerance:  Patient tolerated the procedure well with no immediate complications        Assessment/Plan:     Fan Chris is a 64 y.o. year old male who presents to clinic for labs and knee injection.     1. Chronic pain of right knee  Pt tolerated procedure well. Unfortunately EPIC crashed prior to AVS being printed, aftercare instructions available on the patient portal. Pt verbalized understanding and was in agreement with the plan.    - Large Joint Aspiration/Injection: R knee    2. Stage 3a chronic kidney disease  Discussed worsening of kidney function likely combination of being back on testosterone and non-compliance with medications. Specifically discussed need for compliance with Entresto as this has kidney " protective benefits. Pt considering testosterone cessation, encouraged.        Follow-up: for L knee injection in 1-2 weeks    Dilshad Serrano DO  Family Medicine

## 2024-07-18 ENCOUNTER — OFFICE VISIT (OUTPATIENT)
Dept: FAMILY MEDICINE | Facility: CLINIC | Age: 64
End: 2024-07-18
Payer: COMMERCIAL

## 2024-07-18 VITALS
SYSTOLIC BLOOD PRESSURE: 112 MMHG | HEIGHT: 69 IN | HEART RATE: 95 BPM | DIASTOLIC BLOOD PRESSURE: 78 MMHG | WEIGHT: 210.31 LBS | OXYGEN SATURATION: 97 % | BODY MASS INDEX: 31.15 KG/M2

## 2024-07-18 DIAGNOSIS — G89.29 CHRONIC PAIN OF LEFT KNEE: Primary | ICD-10-CM

## 2024-07-18 DIAGNOSIS — I10 ESSENTIAL HYPERTENSION: ICD-10-CM

## 2024-07-18 DIAGNOSIS — I50.42 CHRONIC COMBINED SYSTOLIC AND DIASTOLIC CONGESTIVE HEART FAILURE: ICD-10-CM

## 2024-07-18 DIAGNOSIS — M25.562 CHRONIC PAIN OF LEFT KNEE: Primary | ICD-10-CM

## 2024-07-18 PROCEDURE — 3008F BODY MASS INDEX DOCD: CPT | Mod: CPTII,S$GLB,, | Performed by: STUDENT IN AN ORGANIZED HEALTH CARE EDUCATION/TRAINING PROGRAM

## 2024-07-18 PROCEDURE — 3078F DIAST BP <80 MM HG: CPT | Mod: CPTII,S$GLB,, | Performed by: STUDENT IN AN ORGANIZED HEALTH CARE EDUCATION/TRAINING PROGRAM

## 2024-07-18 PROCEDURE — 3044F HG A1C LEVEL LT 7.0%: CPT | Mod: CPTII,S$GLB,, | Performed by: STUDENT IN AN ORGANIZED HEALTH CARE EDUCATION/TRAINING PROGRAM

## 2024-07-18 PROCEDURE — 20610 DRAIN/INJ JOINT/BURSA W/O US: CPT | Mod: LT,S$GLB,, | Performed by: STUDENT IN AN ORGANIZED HEALTH CARE EDUCATION/TRAINING PROGRAM

## 2024-07-18 PROCEDURE — 1159F MED LIST DOCD IN RCRD: CPT | Mod: CPTII,S$GLB,, | Performed by: STUDENT IN AN ORGANIZED HEALTH CARE EDUCATION/TRAINING PROGRAM

## 2024-07-18 PROCEDURE — 99999 PR PBB SHADOW E&M-EST. PATIENT-LVL IV: CPT | Mod: PBBFAC,,, | Performed by: STUDENT IN AN ORGANIZED HEALTH CARE EDUCATION/TRAINING PROGRAM

## 2024-07-18 PROCEDURE — 1160F RVW MEDS BY RX/DR IN RCRD: CPT | Mod: CPTII,S$GLB,, | Performed by: STUDENT IN AN ORGANIZED HEALTH CARE EDUCATION/TRAINING PROGRAM

## 2024-07-18 PROCEDURE — 99213 OFFICE O/P EST LOW 20 MIN: CPT | Mod: 25,S$GLB,, | Performed by: STUDENT IN AN ORGANIZED HEALTH CARE EDUCATION/TRAINING PROGRAM

## 2024-07-18 PROCEDURE — 3074F SYST BP LT 130 MM HG: CPT | Mod: CPTII,S$GLB,, | Performed by: STUDENT IN AN ORGANIZED HEALTH CARE EDUCATION/TRAINING PROGRAM

## 2024-07-18 RX ORDER — LIDOCAINE HYDROCHLORIDE 10 MG/ML
4 INJECTION INFILTRATION; PERINEURAL
Status: DISCONTINUED | OUTPATIENT
Start: 2024-07-18 | End: 2024-07-18 | Stop reason: HOSPADM

## 2024-07-18 RX ORDER — TRIAMCINOLONE ACETONIDE 40 MG/ML
40 INJECTION, SUSPENSION INTRA-ARTICULAR; INTRAMUSCULAR
Status: DISCONTINUED | OUTPATIENT
Start: 2024-07-18 | End: 2024-07-18 | Stop reason: HOSPADM

## 2024-07-18 RX ADMIN — TRIAMCINOLONE ACETONIDE 40 MG: 40 INJECTION, SUSPENSION INTRA-ARTICULAR; INTRAMUSCULAR at 10:07

## 2024-07-18 RX ADMIN — LIDOCAINE HYDROCHLORIDE 4 ML: 10 INJECTION INFILTRATION; PERINEURAL at 10:07

## 2024-07-18 NOTE — PROGRESS NOTES
Progress Note  [facility]    Subjective:     Fan Chris is a 64 y.o. year old male who presents to clinic for L knee injection    HTN/CHF: Pt reports compliance with entresto. Had some light headedness while exercising in the past he attributed to this medication which is why he wasn't taking it. Hasn't had any issues since being compliant with BID dosing.     Knee pain: R knee doing well since knee injection. Pain improved. Was able to go boxing last night. Has not tried running yet, waiting to get L knee done first.     Patient Active Problem List    Diagnosis Date Noted    Chronic pain of both knees 07/09/2024    Solitary pulmonary nodule 07/25/2023    Noncompliance 07/25/2023    Erectile dysfunction due to arterial insufficiency 07/10/2023    Stage 3 chronic kidney disease 12/19/2022    Essential hypertension 12/19/2022    Apical mural thrombus 08/29/2022    JACLYN (obstructive sleep apnea)     Thrombocytosis 02/27/2020    Nonrheumatic mitral valve regurgitation 10/14/2016    Hyperlipidemia 10/14/2016    Nonspecific abnormal electrocardiogram (ECG) (EKG) 10/04/2016    Cardiomyopathy 10/04/2016    Chronic combined systolic and diastolic congestive heart failure 10/04/2016        Review of patient's allergies indicates:  No Known Allergies     Past Medical History:   Diagnosis Date    Asthma     Bilateral renal cysts     Bronchitis     Cardiomyopathy     CHF (congestive heart failure)     CKD (chronic kidney disease) stage 3, GFR 30-59 ml/min     Hyperlipidemia 10/14/2016    Hypertension     Low testosterone level in male     Sinusitis     Sleep apnea       Past Surgical History:   Procedure Laterality Date    arm fracture      EYE SURGERY      TONSILLECTOMY        Family History   Problem Relation Name Age of Onset    Lung cancer Mother      Cancer Mother  61        lung    Hypertension Father      Hyperlipidemia Father      Heart disease Paternal Grandmother        Social  "History     Tobacco Use    Smoking status: Former     Current packs/day: 0.00     Average packs/day: 1 pack/day for 8.0 years (8.0 ttl pk-yrs)     Types: Cigarettes     Start date: 10/4/1976     Quit date: 10/4/1984     Years since quittin.8    Smokeless tobacco: Never   Substance Use Topics    Alcohol use: Not Currently     Alcohol/week: 1.0 standard drink of alcohol     Types: 1 Cans of beer per week        Objective:     Vitals:    24 1014   BP: 112/78   BP Location: Right arm   Patient Position: Sitting   BP Method: Large (Manual)   Pulse: 95   SpO2: 97%   Weight: 95.4 kg (210 lb 5.1 oz)   Height: 5' 9" (1.753 m)     BMI: 31.06    Gen: No apparent distress, well nourished and developed, appears stated age  Resp: normal respiratory effort    Large Joint Aspiration/Injection: L knee    Date/Time: 2024 10:40 AM    Performed by: Dilshad Serrano, DO  Authorized by: Dilshad Serrano, DO    Consent Done?:  Yes (Written)  Indications:  Pain  Timeout: prior to procedure the correct patient, procedure, and site was verified      Local anesthesia used?: Yes    Local anesthetic:  Lidocaine spray    Details:  Needle Size:  25 G  Ultrasonic Guidance for needle placement?: No    Approach:  Anterolateral  Location:  Knee  Site:  L knee  Medications:  4 mL LIDOcaine HCL 10 mg/ml (1%) 10 mg/mL (1 %); 40 mg triamcinolone acetonide 40 mg/mL  Patient tolerance:  Patient tolerated the procedure well with no immediate complications        Assessment/Plan:     Fan Chris is a 64 y.o. year old male who presents to clinic for L knee injection     1. Chronic pain of left knee  Pt tolerated procedure well. Aftercare instructions provided.   - Large Joint Aspiration/Injection: L knee    2. Chronic combined systolic and diastolic congestive heart failure  Doing well on entresto BID. Continued to encourage medication compliance.     3. Essential hypertension  As above. Stable.      Follow-up: December for routine " healthcare      Dilshad Serrano, DO  Family Medicine

## 2024-09-03 ENCOUNTER — TELEPHONE (OUTPATIENT)
Dept: CARDIOLOGY | Facility: CLINIC | Age: 64
End: 2024-09-03
Payer: COMMERCIAL

## 2024-09-03 NOTE — TELEPHONE ENCOUNTER
Spoke with patient and rescheduled 9/17 with Dr Kim to 9/20 at 3:00 pm. Patient accepted appointment and give us a call back if he can not make it.

## 2024-09-19 NOTE — PROGRESS NOTES
"Subjective:   @Patient ID:  Fan Chris is a 64 y.o. male who presents for evaluation of No chief complaint on file.      HPI:   LV seen by Dr. Mcdaniel  Mr. El is a pleasant 63 yo gentleman hx of NICM, CKD3, HTN, apical thrombus here for follow up. Only taking entresto 1 pill not twice a day. He feels great. Voiced no CV complaints. Daily exercise without symptoms. Denies cp, sob, palpitations, presyncope/dizziness, syncope, orthopnea, PND, bendopnea, decrease ET, NVDC, fever, chills.          Echo 07/20/23  The left ventricle is mildly enlarged with mild concentric hypertrophy and mildly decreased systolic function.  Moderate left atrial enlargement.  The estimated ejection fraction is 40%.  Grade I left ventricular diastolic dysfunction.  Normal right ventricular size with normal right ventricular systolic function.  Mild tricuspid regurgitation.  Mild aortic regurgitation.    Patient Active Problem List    Diagnosis Date Noted    Chronic pain of both knees 07/09/2024    Solitary pulmonary nodule 07/25/2023    Noncompliance 07/25/2023    Erectile dysfunction due to arterial insufficiency 07/10/2023    Stage 3 chronic kidney disease 12/19/2022    Essential hypertension 12/19/2022     cont current medication      Apical mural thrombus 08/29/2022    JACLYN (obstructive sleep apnea)     Thrombocytosis 02/27/2020    Nonrheumatic mitral valve regurgitation 10/14/2016    Hyperlipidemia 10/14/2016    Nonspecific abnormal electrocardiogram (ECG) (EKG) 10/04/2016    Cardiomyopathy 10/04/2016    Chronic combined systolic and diastolic congestive heart failure 10/04/2016                    LABS  CBC  @LABRCNTIP(WBC:3,RBC:3,HGB:3,HCT:3,PLT:3,MCV:3,MCH:3,MCHC:3)@  BMP  @LABRCNTIP(NA:3,K:3,CO2:3,CL:3,BUN:3,Creatinine:3,GLU:3,GFR:3)@    POCT-Glucose  No results found for: "POCTGLUCOSE"    @LABRCNTIP(Calcium:3,MG:3,PHOS:3)@  LFT  @LABRCNTIP(PROT:3,Albumin:3,,Bilitot:3,AST:3,Alkphos:3,ALT:3)@  GFR "     COAGS  @LABRCNTIP(PT:3,INR:3,APTT:3)@  CE  @LABRCNTIP(troponini:3,cktotal:3,ckmb:3)@  ABGs  @[id number](PH,PCO2,PO2,HCO3,POCSATURATED,BE)@  BNP  @LABRCNTIP(BNP:3)@    LAST HbA1c  Lab Results   Component Value Date    HGBA1C 5.4 01/02/2024       Lipid panel  Lab Results   Component Value Date    CHOL 261 (H) 04/04/2024    CHOL 257 (H) 01/02/2024    CHOL 249 (H) 07/20/2023     Lab Results   Component Value Date    HDL 42 04/04/2024    HDL 37 (L) 01/02/2024    HDL 34 (L) 07/20/2023     Lab Results   Component Value Date    LDLCALC 185.2 (H) 04/04/2024    LDLCALC 174.0 (H) 01/02/2024    LDLCALC 178.6 (H) 07/20/2023     Lab Results   Component Value Date    TRIG 169 (H) 04/04/2024    TRIG 230 (H) 01/02/2024    TRIG 182 (H) 07/20/2023     Lab Results   Component Value Date    CHOLHDL 16.1 (L) 04/04/2024    CHOLHDL 14.4 (L) 01/02/2024    CHOLHDL 13.7 (L) 07/20/2023            Review of Systems   All other systems reviewed and are negative.      Objective:   Physical Exam  Gen: AOx3, NAD, comfortable appearing  HEENT: NCAT, PERRL, EOMI, MMM, JVP, no thyromegaly, lymphadenopathy appreciated  CV: RRR, S1/S2 appreciated, no murmur; no gallop or rubs  Resp: CTAB, no increased WOB  Abdomen: Soft, NT, ND, +BS  Ext: 2+ distal pulses, no edema appreciated  Skin: Warm, dry, no rashes appreciated  Psych: Good mood, Affect  Neuro: AAOx4, Strength 5/5 in extremities bilaterally; sensation to touch equal throughout, follows commands        Assessment:     1. Cardiomyopathy, unspecified type    2. Chronic combined systolic and diastolic congestive heart failure    3. Essential hypertension    4. Mixed hyperlipidemia    5. Apical mural thrombus        Plan:   -Will repeat echocardiogram. Adjust medications as needed. Does not want to take many meds. Currently taking vitamins and BCA for the gym.  -Target BP < 130/80 mmHg  -Dietary changes to address TG  -HLD- not at goal. Hesitant to start taking many meds.       He expressed verbal  understanding and agreed with the plan    Pertinent cardiac images and EKG reviewed independently.    Continue with current medical plan and lifestyle changes.  Return sooner for concerns or questions. If symptoms persist go to the ED.  I have reviewed all pertinent data including patient's medical history in detail and updated the computerized patient record.   \  Total time spent counseling greater than fifty percent of total visit time.  Counseling included discussion regarding imaging findings, diagnosis, possibilities, treatment options, risks and benefits.    Thank you for the opportunity to care for this patient. Will be available for questions if needed.       Orders Placed This Encounter   Procedures    Echo     Standing Status:   Future     Standing Expiration Date:   9/20/2025     Order Specific Question:   Release to patient     Answer:   Immediate       Follow up as scheduled. Return sooner for concerns or questions            He expressed verbal understanding and agreed with the plan        Patient's Medications   New Prescriptions    No medications on file   Previous Medications    ASPIRIN (ECOTRIN) 81 MG EC TABLET    Take 1 tablet (81 mg total) by mouth once daily.    CARVEDILOL (COREG) 6.25 MG TABLET    Take 1 tablet (6.25 mg total) by mouth every 12 (twelve) hours.    OMEGA-3 FATTY ACIDS (FISH OIL CONCENTRATE ORAL)    Take by mouth.    SACUBITRIL-VALSARTAN (ENTRESTO) 49-51 MG PER TABLET    Take 1 tablet by mouth 2 (two) times daily.    TESTOSTERONE CYPIONATE IM    Inject into the muscle. Twice weekly   Modified Medications    No medications on file   Discontinued Medications    ATORVASTATIN (LIPITOR) 10 MG TABLET    Take 1 tablet (10 mg total) by mouth once daily.        Leonard Kim MD  Cardiovascular Disease [facility]

## 2024-09-20 ENCOUNTER — OFFICE VISIT (OUTPATIENT)
Dept: CARDIOLOGY | Facility: CLINIC | Age: 64
End: 2024-09-20
Payer: COMMERCIAL

## 2024-09-20 VITALS
WEIGHT: 200 LBS | BODY MASS INDEX: 29.62 KG/M2 | SYSTOLIC BLOOD PRESSURE: 119 MMHG | HEIGHT: 69 IN | DIASTOLIC BLOOD PRESSURE: 80 MMHG | OXYGEN SATURATION: 99 % | HEART RATE: 64 BPM

## 2024-09-20 DIAGNOSIS — I42.9 CARDIOMYOPATHY, UNSPECIFIED TYPE: Primary | ICD-10-CM

## 2024-09-20 DIAGNOSIS — E78.2 MIXED HYPERLIPIDEMIA: ICD-10-CM

## 2024-09-20 DIAGNOSIS — I10 ESSENTIAL HYPERTENSION: ICD-10-CM

## 2024-09-20 DIAGNOSIS — I50.42 CHRONIC COMBINED SYSTOLIC AND DIASTOLIC CONGESTIVE HEART FAILURE: ICD-10-CM

## 2024-09-20 DIAGNOSIS — I51.3 APICAL MURAL THROMBUS: ICD-10-CM

## 2024-09-20 PROCEDURE — 3074F SYST BP LT 130 MM HG: CPT | Mod: CPTII,S$GLB,, | Performed by: STUDENT IN AN ORGANIZED HEALTH CARE EDUCATION/TRAINING PROGRAM

## 2024-09-20 PROCEDURE — 99999 PR PBB SHADOW E&M-EST. PATIENT-LVL III: CPT | Mod: PBBFAC,,, | Performed by: STUDENT IN AN ORGANIZED HEALTH CARE EDUCATION/TRAINING PROGRAM

## 2024-09-20 PROCEDURE — 99214 OFFICE O/P EST MOD 30 MIN: CPT | Mod: S$GLB,,, | Performed by: STUDENT IN AN ORGANIZED HEALTH CARE EDUCATION/TRAINING PROGRAM

## 2024-09-20 PROCEDURE — 3008F BODY MASS INDEX DOCD: CPT | Mod: CPTII,S$GLB,, | Performed by: STUDENT IN AN ORGANIZED HEALTH CARE EDUCATION/TRAINING PROGRAM

## 2024-09-20 PROCEDURE — 3044F HG A1C LEVEL LT 7.0%: CPT | Mod: CPTII,S$GLB,, | Performed by: STUDENT IN AN ORGANIZED HEALTH CARE EDUCATION/TRAINING PROGRAM

## 2024-09-20 PROCEDURE — 1159F MED LIST DOCD IN RCRD: CPT | Mod: CPTII,S$GLB,, | Performed by: STUDENT IN AN ORGANIZED HEALTH CARE EDUCATION/TRAINING PROGRAM

## 2024-09-20 PROCEDURE — 3079F DIAST BP 80-89 MM HG: CPT | Mod: CPTII,S$GLB,, | Performed by: STUDENT IN AN ORGANIZED HEALTH CARE EDUCATION/TRAINING PROGRAM

## 2024-10-29 ENCOUNTER — TELEPHONE (OUTPATIENT)
Dept: CARDIOLOGY | Facility: CLINIC | Age: 64
End: 2024-10-29
Payer: COMMERCIAL

## 2024-11-27 DIAGNOSIS — N18.30 STAGE 3 CHRONIC KIDNEY DISEASE: ICD-10-CM

## 2024-12-17 ENCOUNTER — LAB VISIT (OUTPATIENT)
Dept: LAB | Facility: HOSPITAL | Age: 64
End: 2024-12-17
Attending: STUDENT IN AN ORGANIZED HEALTH CARE EDUCATION/TRAINING PROGRAM
Payer: COMMERCIAL

## 2024-12-17 ENCOUNTER — OFFICE VISIT (OUTPATIENT)
Dept: FAMILY MEDICINE | Facility: CLINIC | Age: 64
End: 2024-12-17
Payer: COMMERCIAL

## 2024-12-17 VITALS
SYSTOLIC BLOOD PRESSURE: 160 MMHG | WEIGHT: 212.94 LBS | OXYGEN SATURATION: 96 % | HEART RATE: 88 BPM | DIASTOLIC BLOOD PRESSURE: 90 MMHG | HEIGHT: 69 IN | BODY MASS INDEX: 31.54 KG/M2

## 2024-12-17 DIAGNOSIS — Z91.199 NONCOMPLIANCE: ICD-10-CM

## 2024-12-17 DIAGNOSIS — I10 ESSENTIAL HYPERTENSION: Primary | ICD-10-CM

## 2024-12-17 DIAGNOSIS — G89.29 CHRONIC PAIN OF BOTH KNEES: ICD-10-CM

## 2024-12-17 DIAGNOSIS — M25.561 CHRONIC PAIN OF BOTH KNEES: ICD-10-CM

## 2024-12-17 DIAGNOSIS — Z12.5 SCREENING FOR MALIGNANT NEOPLASM OF PROSTATE: ICD-10-CM

## 2024-12-17 DIAGNOSIS — D58.2 ELEVATED HEMOGLOBIN: ICD-10-CM

## 2024-12-17 DIAGNOSIS — I10 ESSENTIAL HYPERTENSION: ICD-10-CM

## 2024-12-17 DIAGNOSIS — Z00.00 ANNUAL PHYSICAL EXAM: Primary | ICD-10-CM

## 2024-12-17 DIAGNOSIS — Z79.890 LONG-TERM CURRENT USE OF TESTOSTERONE CYPIONATE: ICD-10-CM

## 2024-12-17 DIAGNOSIS — M25.562 CHRONIC PAIN OF BOTH KNEES: ICD-10-CM

## 2024-12-17 DIAGNOSIS — H61.23 BILATERAL IMPACTED CERUMEN: ICD-10-CM

## 2024-12-17 DIAGNOSIS — I50.42 CHRONIC COMBINED SYSTOLIC AND DIASTOLIC CONGESTIVE HEART FAILURE: ICD-10-CM

## 2024-12-17 LAB
ALBUMIN SERPL BCP-MCNC: 4.3 G/DL (ref 3.5–5.2)
ALP SERPL-CCNC: 69 U/L (ref 40–150)
ALT SERPL W/O P-5'-P-CCNC: 32 U/L (ref 10–44)
ANION GAP SERPL CALC-SCNC: 12 MMOL/L (ref 8–16)
AST SERPL-CCNC: 23 U/L (ref 10–40)
BASOPHILS # BLD AUTO: 0.07 K/UL (ref 0–0.2)
BASOPHILS NFR BLD: 1.5 % (ref 0–1.9)
BILIRUB SERPL-MCNC: 0.7 MG/DL (ref 0.1–1)
BUN SERPL-MCNC: 19 MG/DL (ref 8–23)
CALCIUM SERPL-MCNC: 9.6 MG/DL (ref 8.7–10.5)
CHLORIDE SERPL-SCNC: 104 MMOL/L (ref 95–110)
CO2 SERPL-SCNC: 24 MMOL/L (ref 23–29)
CREAT SERPL-MCNC: 1.2 MG/DL (ref 0.5–1.4)
DIFFERENTIAL METHOD BLD: ABNORMAL
EOSINOPHIL # BLD AUTO: 0.2 K/UL (ref 0–0.5)
EOSINOPHIL NFR BLD: 3.4 % (ref 0–8)
ERYTHROCYTE [DISTWIDTH] IN BLOOD BY AUTOMATED COUNT: 15 % (ref 11.5–14.5)
EST. GFR  (NO RACE VARIABLE): >60 ML/MIN/1.73 M^2
GLUCOSE SERPL-MCNC: 94 MG/DL (ref 70–110)
HCT VFR BLD AUTO: 46.9 % (ref 40–54)
HGB BLD-MCNC: 16.2 G/DL (ref 14–18)
IMM GRANULOCYTES # BLD AUTO: 0.04 K/UL (ref 0–0.04)
IMM GRANULOCYTES NFR BLD AUTO: 0.8 % (ref 0–0.5)
IRON SERPL-MCNC: 138 UG/DL (ref 45–160)
LYMPHOCYTES # BLD AUTO: 1.1 K/UL (ref 1–4.8)
LYMPHOCYTES NFR BLD: 23.4 % (ref 18–48)
MCH RBC QN AUTO: 30.8 PG (ref 27–31)
MCHC RBC AUTO-ENTMCNC: 34.5 G/DL (ref 32–36)
MCV RBC AUTO: 89 FL (ref 82–98)
MONOCYTES # BLD AUTO: 0.6 K/UL (ref 0.3–1)
MONOCYTES NFR BLD: 12.8 % (ref 4–15)
NEUTROPHILS # BLD AUTO: 2.8 K/UL (ref 1.8–7.7)
NEUTROPHILS NFR BLD: 58.1 % (ref 38–73)
NRBC BLD-RTO: 0 /100 WBC
PLATELET # BLD AUTO: 381 K/UL (ref 150–450)
PMV BLD AUTO: 8.6 FL (ref 9.2–12.9)
POTASSIUM SERPL-SCNC: 4 MMOL/L (ref 3.5–5.1)
PROT SERPL-MCNC: 7.3 G/DL (ref 6–8.4)
RBC # BLD AUTO: 5.26 M/UL (ref 4.6–6.2)
SATURATED IRON: 35 % (ref 20–50)
SODIUM SERPL-SCNC: 140 MMOL/L (ref 136–145)
TOTAL IRON BINDING CAPACITY: 400 UG/DL (ref 250–450)
TRANSFERRIN SERPL-MCNC: 270 MG/DL (ref 200–375)
WBC # BLD AUTO: 4.75 K/UL (ref 3.9–12.7)

## 2024-12-17 PROCEDURE — 99214 OFFICE O/P EST MOD 30 MIN: CPT | Mod: S$GLB,,, | Performed by: STUDENT IN AN ORGANIZED HEALTH CARE EDUCATION/TRAINING PROGRAM

## 2024-12-17 PROCEDURE — 1159F MED LIST DOCD IN RCRD: CPT | Mod: CPTII,S$GLB,, | Performed by: STUDENT IN AN ORGANIZED HEALTH CARE EDUCATION/TRAINING PROGRAM

## 2024-12-17 PROCEDURE — 3044F HG A1C LEVEL LT 7.0%: CPT | Mod: CPTII,S$GLB,, | Performed by: STUDENT IN AN ORGANIZED HEALTH CARE EDUCATION/TRAINING PROGRAM

## 2024-12-17 PROCEDURE — 80053 COMPREHEN METABOLIC PANEL: CPT | Performed by: STUDENT IN AN ORGANIZED HEALTH CARE EDUCATION/TRAINING PROGRAM

## 2024-12-17 PROCEDURE — 36415 COLL VENOUS BLD VENIPUNCTURE: CPT | Mod: PO | Performed by: STUDENT IN AN ORGANIZED HEALTH CARE EDUCATION/TRAINING PROGRAM

## 2024-12-17 PROCEDURE — 85025 COMPLETE CBC W/AUTO DIFF WBC: CPT | Performed by: STUDENT IN AN ORGANIZED HEALTH CARE EDUCATION/TRAINING PROGRAM

## 2024-12-17 PROCEDURE — 83540 ASSAY OF IRON: CPT | Performed by: STUDENT IN AN ORGANIZED HEALTH CARE EDUCATION/TRAINING PROGRAM

## 2024-12-17 PROCEDURE — G2211 COMPLEX E/M VISIT ADD ON: HCPCS | Mod: S$GLB,,, | Performed by: STUDENT IN AN ORGANIZED HEALTH CARE EDUCATION/TRAINING PROGRAM

## 2024-12-17 PROCEDURE — 3008F BODY MASS INDEX DOCD: CPT | Mod: CPTII,S$GLB,, | Performed by: STUDENT IN AN ORGANIZED HEALTH CARE EDUCATION/TRAINING PROGRAM

## 2024-12-17 PROCEDURE — 3080F DIAST BP >= 90 MM HG: CPT | Mod: CPTII,S$GLB,, | Performed by: STUDENT IN AN ORGANIZED HEALTH CARE EDUCATION/TRAINING PROGRAM

## 2024-12-17 PROCEDURE — 3077F SYST BP >= 140 MM HG: CPT | Mod: CPTII,S$GLB,, | Performed by: STUDENT IN AN ORGANIZED HEALTH CARE EDUCATION/TRAINING PROGRAM

## 2024-12-17 PROCEDURE — 1160F RVW MEDS BY RX/DR IN RCRD: CPT | Mod: CPTII,S$GLB,, | Performed by: STUDENT IN AN ORGANIZED HEALTH CARE EDUCATION/TRAINING PROGRAM

## 2024-12-17 PROCEDURE — 99999 PR PBB SHADOW E&M-EST. PATIENT-LVL IV: CPT | Mod: PBBFAC,,, | Performed by: STUDENT IN AN ORGANIZED HEALTH CARE EDUCATION/TRAINING PROGRAM

## 2024-12-17 NOTE — PROGRESS NOTES
Progress Note  [facility]    Subjective:     Fan Chris is a 64 y.o. year old male with current diagnoses of hyperlipidemia, hypertension, obstructive sleep apnea, obesity, CKD stage 3, chronic combined systolic and diastolic congestive heart failure and dilated cardiomyopathy who presents to clinic for f/u    HTN: Per cardiology goal BP <130/80. Prescribed coreg and entresto. Will take 1/2 of entresto about 3 times a week, not taking consistently. Notes he will get light headed with exercising with taking this medication at times. Has echo coming up in about 3 weeks.     Had been on testosterone previously from outside provider. Has stopped the testosterone.     Knee pain: Doing well since injections, able to climb and do regular activities without discomfort.    Ears: Has recurrent cerumen impaction. Previously saw ENT. Feels this has recurred.     Patient Active Problem List    Diagnosis Date Noted    Chronic pain of both knees 07/09/2024    Solitary pulmonary nodule 07/25/2023    Noncompliance 07/25/2023    Erectile dysfunction due to arterial insufficiency 07/10/2023    Stage 3 chronic kidney disease 12/19/2022    Essential hypertension 12/19/2022    Apical mural thrombus 08/29/2022    JACLYN (obstructive sleep apnea)     Thrombocytosis 02/27/2020    Nonrheumatic mitral valve regurgitation 10/14/2016    Hyperlipidemia 10/14/2016    Nonspecific abnormal electrocardiogram (ECG) (EKG) 10/04/2016    Cardiomyopathy 10/04/2016    Chronic combined systolic and diastolic congestive heart failure 10/04/2016        Review of patient's allergies indicates:  No Known Allergies     Past Medical History:   Diagnosis Date    Asthma     Bilateral renal cysts     Bronchitis     Cardiomyopathy     CHF (congestive heart failure)     CKD (chronic kidney disease) stage 3, GFR 30-59 ml/min     Hyperlipidemia 10/14/2016    Hypertension     Low testosterone level in male     Sinusitis     Sleep apnea   "     Past Surgical History:   Procedure Laterality Date    arm fracture      EYE SURGERY      TONSILLECTOMY        Family History   Problem Relation Name Age of Onset    Lung cancer Mother      Cancer Mother  61        lung    Hypertension Father      Hyperlipidemia Father      Heart disease Paternal Grandmother        Social History     Tobacco Use    Smoking status: Former     Current packs/day: 0.00     Average packs/day: 1 pack/day for 8.0 years (8.0 ttl pk-yrs)     Types: Cigarettes     Start date: 10/4/1976     Quit date: 10/4/1984     Years since quittin.2    Smokeless tobacco: Never   Substance Use Topics    Alcohol use: Not Currently     Alcohol/week: 1.0 standard drink of alcohol     Types: 1 Cans of beer per week        Objective:     Vitals:    24 0838   BP: (!) 160/90   BP Location: Right arm   Patient Position: Sitting   Pulse: 88   SpO2: 96%   Weight: 96.6 kg (212 lb 15.4 oz)   Height: 5' 9" (1.753 m)     BMI: 31.45    Gen: No apparent distress, well nourished and developed, appears stated age  CV: RRR, S1 and S2 present, no LE edema  Resp: CTAB, normal respiratory effort  HEENT: NCAT, TM clear on the R with moderate cerumen burden, L TM not visualized because impacted by cerumen.     Assessment/Plan:     Fan Chris is a 64 y.o. year old male who presents to clinic for f/u    1. Essential hypertension (Primary)  Stressed importance of medication compliance and home BP monitoring. BP well above goal today especially with CHF. Pt verbalized understanding, would like to see what upcoming echo looks like first before making any decisions about medications. Would like to avoid medications long-term at this time.   - Comprehensive metabolic panel; Future    2. Chronic combined systolic and diastolic congestive heart failure  As above.     3. Noncompliance  As above.     4. Bilateral impacted cerumen  Staff irrigated ears with marked improvement.     5. Chronic pain of both knees  Doing well s/p " injections.      Follow-up: for annual with fasting labs prior    I spent a total of 35 minutes on the day of the visit.This includes face to face time and non-face to face time preparing to see the patient (eg, review of tests), obtaining and/or reviewing separately obtained history, documenting clinical information in the electronic or other health record, independently interpreting results and communicating results to the patient/family/caregiver, or care coordinator.      Dilshad Serrano, DO  Family Medicine

## 2024-12-18 ENCOUNTER — PATIENT MESSAGE (OUTPATIENT)
Dept: FAMILY MEDICINE | Facility: CLINIC | Age: 64
End: 2024-12-18
Payer: COMMERCIAL

## 2024-12-18 DIAGNOSIS — H61.23 BILATERAL IMPACTED CERUMEN: Primary | ICD-10-CM

## 2025-01-02 ENCOUNTER — PATIENT MESSAGE (OUTPATIENT)
Dept: CARDIOLOGY | Facility: CLINIC | Age: 65
End: 2025-01-02
Payer: COMMERCIAL

## 2025-02-06 ENCOUNTER — PATIENT OUTREACH (OUTPATIENT)
Dept: ADMINISTRATIVE | Facility: HOSPITAL | Age: 65
End: 2025-02-06
Payer: COMMERCIAL

## 2025-02-06 NOTE — PROGRESS NOTES
02/06/2025  VB chart audit performed. Care Everywhere updates requested and reviewed  Overdue HM topic chart audit and/or requested. LINKS triggered and reconciled. Media reviewed. Health Maintenance Topic(s) Outreach Outcomes & Actions Taken:    Blood Pressure - Outreach Outcomes & Actions Taken  : Patient Will Call Back or Send Portal Message with Reading    Care Management, Digital Medicine, and/or Education Referrals  Next Steps - Referral Actions: Digital Medicine Outcomes and Actions Taken: Pt Declined or Not Eligible

## 2025-05-06 ENCOUNTER — LAB VISIT (OUTPATIENT)
Dept: LAB | Facility: HOSPITAL | Age: 65
End: 2025-05-06
Attending: STUDENT IN AN ORGANIZED HEALTH CARE EDUCATION/TRAINING PROGRAM
Payer: COMMERCIAL

## 2025-05-06 DIAGNOSIS — D58.2 ELEVATED HEMOGLOBIN: ICD-10-CM

## 2025-05-06 DIAGNOSIS — Z12.5 SCREENING FOR MALIGNANT NEOPLASM OF PROSTATE: ICD-10-CM

## 2025-05-06 DIAGNOSIS — Z00.00 ANNUAL PHYSICAL EXAM: ICD-10-CM

## 2025-05-06 LAB
ABSOLUTE EOSINOPHIL (OHS): 0.23 K/UL
ABSOLUTE MONOCYTE (OHS): 0.47 K/UL (ref 0.3–1)
ABSOLUTE NEUTROPHIL COUNT (OHS): 2.49 K/UL (ref 1.8–7.7)
ALBUMIN SERPL BCP-MCNC: 4.2 G/DL (ref 3.5–5.2)
ALP SERPL-CCNC: 78 UNIT/L (ref 40–150)
ALT SERPL W/O P-5'-P-CCNC: 28 UNIT/L (ref 10–44)
ANION GAP (OHS): 12 MMOL/L (ref 8–16)
AST SERPL-CCNC: 26 UNIT/L (ref 11–45)
BASOPHILS # BLD AUTO: 0.05 K/UL
BASOPHILS NFR BLD AUTO: 1.1 %
BILIRUB SERPL-MCNC: 0.6 MG/DL (ref 0.1–1)
BUN SERPL-MCNC: 23 MG/DL (ref 8–23)
CALCIUM SERPL-MCNC: 9.5 MG/DL (ref 8.7–10.5)
CHLORIDE SERPL-SCNC: 104 MMOL/L (ref 95–110)
CHOLEST SERPL-MCNC: 278 MG/DL (ref 120–199)
CHOLEST/HDLC SERPL: 7 {RATIO} (ref 2–5)
CO2 SERPL-SCNC: 24 MMOL/L (ref 23–29)
CREAT SERPL-MCNC: 1.3 MG/DL (ref 0.5–1.4)
EAG (OHS): 111 MG/DL (ref 68–131)
ERYTHROCYTE [DISTWIDTH] IN BLOOD BY AUTOMATED COUNT: 14.2 % (ref 11.5–14.5)
GFR SERPLBLD CREATININE-BSD FMLA CKD-EPI: >60 ML/MIN/1.73/M2
GLUCOSE SERPL-MCNC: 98 MG/DL (ref 70–110)
HBA1C MFR BLD: 5.5 % (ref 4–5.6)
HCT VFR BLD AUTO: 46 % (ref 40–54)
HDLC SERPL-MCNC: 40 MG/DL (ref 40–75)
HDLC SERPL: 14.4 % (ref 20–50)
HGB BLD-MCNC: 14.9 GM/DL (ref 14–18)
IMM GRANULOCYTES # BLD AUTO: 0.02 K/UL (ref 0–0.04)
IMM GRANULOCYTES NFR BLD AUTO: 0.4 % (ref 0–0.5)
IRON SATN MFR SERPL: 23 % (ref 20–50)
IRON SERPL-MCNC: 108 UG/DL (ref 45–160)
LDLC SERPL CALC-MCNC: 198 MG/DL (ref 63–159)
LYMPHOCYTES # BLD AUTO: 1.34 K/UL (ref 1–4.8)
MCH RBC QN AUTO: 29.1 PG (ref 27–31)
MCHC RBC AUTO-ENTMCNC: 32.4 G/DL (ref 32–36)
MCV RBC AUTO: 90 FL (ref 82–98)
NONHDLC SERPL-MCNC: 238 MG/DL
NUCLEATED RBC (/100WBC) (OHS): 0 /100 WBC
PLATELET # BLD AUTO: 403 K/UL (ref 150–450)
PMV BLD AUTO: 9 FL (ref 9.2–12.9)
POTASSIUM SERPL-SCNC: 4.1 MMOL/L (ref 3.5–5.1)
PROT SERPL-MCNC: 7.3 GM/DL (ref 6–8.4)
PSA SERPL-MCNC: 0.98 NG/ML
RBC # BLD AUTO: 5.12 M/UL (ref 4.6–6.2)
RELATIVE EOSINOPHIL (OHS): 5 %
RELATIVE LYMPHOCYTE (OHS): 29.1 % (ref 18–48)
RELATIVE MONOCYTE (OHS): 10.2 % (ref 4–15)
RELATIVE NEUTROPHIL (OHS): 54.2 % (ref 38–73)
SODIUM SERPL-SCNC: 140 MMOL/L (ref 136–145)
TIBC SERPL-MCNC: 471 UG/DL (ref 250–450)
TRANSFERRIN SERPL-MCNC: 318 MG/DL (ref 200–375)
TRIGL SERPL-MCNC: 200 MG/DL (ref 30–150)
TSH SERPL-ACNC: 2.1 UIU/ML (ref 0.4–4)
WBC # BLD AUTO: 4.6 K/UL (ref 3.9–12.7)

## 2025-05-06 PROCEDURE — 80053 COMPREHEN METABOLIC PANEL: CPT

## 2025-05-06 PROCEDURE — 36415 COLL VENOUS BLD VENIPUNCTURE: CPT | Mod: PO

## 2025-05-06 PROCEDURE — 84443 ASSAY THYROID STIM HORMONE: CPT

## 2025-05-06 PROCEDURE — 84466 ASSAY OF TRANSFERRIN: CPT

## 2025-05-06 PROCEDURE — 80061 LIPID PANEL: CPT

## 2025-05-06 PROCEDURE — 84153 ASSAY OF PSA TOTAL: CPT

## 2025-05-06 PROCEDURE — 85025 COMPLETE CBC W/AUTO DIFF WBC: CPT

## 2025-05-06 PROCEDURE — 83036 HEMOGLOBIN GLYCOSYLATED A1C: CPT

## 2025-05-07 ENCOUNTER — RESULTS FOLLOW-UP (OUTPATIENT)
Dept: FAMILY MEDICINE | Facility: CLINIC | Age: 65
End: 2025-05-07

## 2025-05-07 ENCOUNTER — TELEPHONE (OUTPATIENT)
Dept: FAMILY MEDICINE | Facility: CLINIC | Age: 65
End: 2025-05-07
Payer: COMMERCIAL

## 2025-05-07 NOTE — TELEPHONE ENCOUNTER
----- Message from Dilshad Serrano DO sent at 5/7/2025  8:56 AM CDT -----  Please get patient rescheduled for annual exam.   ----- Message -----  From: Lab, Background User  Sent: 5/6/2025   5:11 PM CDT  To: Dilshad Serrano DO

## 2025-08-04 ENCOUNTER — PATIENT MESSAGE (OUTPATIENT)
Dept: ADMINISTRATIVE | Facility: HOSPITAL | Age: 65
End: 2025-08-04
Payer: COMMERCIAL